# Patient Record
Sex: FEMALE | Race: WHITE | NOT HISPANIC OR LATINO | Employment: UNEMPLOYED | URBAN - METROPOLITAN AREA
[De-identification: names, ages, dates, MRNs, and addresses within clinical notes are randomized per-mention and may not be internally consistent; named-entity substitution may affect disease eponyms.]

---

## 2022-07-25 ENCOUNTER — IMMUNIZATION (OUTPATIENT)
Dept: INTERNAL MEDICINE | Facility: CLINIC | Age: 62
End: 2022-07-25

## 2022-07-25 DIAGNOSIS — Z23 NEED FOR VACCINATION: Primary | ICD-10-CM

## 2022-07-25 PROCEDURE — 0054A COVID-19, MRNA, LNP-S, PF, 30 MCG/0.3 ML DOSE VACCINE (PFIZER): CPT | Mod: CV19,PBBFAC | Performed by: INTERNAL MEDICINE

## 2023-01-30 ENCOUNTER — IMMUNIZATION (OUTPATIENT)
Dept: INTERNAL MEDICINE | Facility: CLINIC | Age: 63
End: 2023-01-30

## 2023-01-30 DIAGNOSIS — Z23 NEED FOR VACCINATION: Primary | ICD-10-CM

## 2023-01-30 PROCEDURE — 0124A COVID-19, MRNA, LNP-S, BIVALENT BOOSTER, PF, 30 MCG/0.3 ML DOSE: CPT | Mod: CV19,PBBFAC | Performed by: INTERNAL MEDICINE

## 2023-01-30 PROCEDURE — 91312 COVID-19, MRNA, LNP-S, BIVALENT BOOSTER, PF, 30 MCG/0.3 ML DOSE: CPT | Mod: PBBFAC

## 2023-07-11 ENCOUNTER — OFFICE VISIT (OUTPATIENT)
Dept: DERMATOLOGY | Facility: CLINIC | Age: 63
End: 2023-07-11
Payer: COMMERCIAL

## 2023-07-11 DIAGNOSIS — L28.0 LICHEN SIMPLEX CHRONICUS: ICD-10-CM

## 2023-07-11 DIAGNOSIS — Z79.899 ENCOUNTER FOR LONG-TERM CURRENT USE OF HIGH RISK MEDICATION: ICD-10-CM

## 2023-07-11 DIAGNOSIS — B35.4 TINEA CORPORIS: Primary | ICD-10-CM

## 2023-07-11 PROCEDURE — 99999 PR PBB SHADOW E&M-EST. PATIENT-LVL II: ICD-10-PCS | Mod: PBBFAC,,, | Performed by: DERMATOLOGY

## 2023-07-11 PROCEDURE — 1159F MED LIST DOCD IN RCRD: CPT | Mod: CPTII,S$GLB,, | Performed by: DERMATOLOGY

## 2023-07-11 PROCEDURE — 99204 OFFICE O/P NEW MOD 45 MIN: CPT | Mod: S$GLB,,, | Performed by: DERMATOLOGY

## 2023-07-11 PROCEDURE — 99204 PR OFFICE/OUTPT VISIT, NEW, LEVL IV, 45-59 MIN: ICD-10-PCS | Mod: S$GLB,,, | Performed by: DERMATOLOGY

## 2023-07-11 PROCEDURE — 1160F PR REVIEW ALL MEDS BY PRESCRIBER/CLIN PHARMACIST DOCUMENTED: ICD-10-PCS | Mod: CPTII,S$GLB,, | Performed by: DERMATOLOGY

## 2023-07-11 PROCEDURE — 1159F PR MEDICATION LIST DOCUMENTED IN MEDICAL RECORD: ICD-10-PCS | Mod: CPTII,S$GLB,, | Performed by: DERMATOLOGY

## 2023-07-11 PROCEDURE — 1160F RVW MEDS BY RX/DR IN RCRD: CPT | Mod: CPTII,S$GLB,, | Performed by: DERMATOLOGY

## 2023-07-11 PROCEDURE — 99999 PR PBB SHADOW E&M-EST. PATIENT-LVL II: CPT | Mod: PBBFAC,,, | Performed by: DERMATOLOGY

## 2023-07-11 RX ORDER — KETOCONAZOLE 20 MG/ML
SHAMPOO, SUSPENSION TOPICAL DAILY
Qty: 120 ML | Refills: 5 | Status: SHIPPED | OUTPATIENT
Start: 2023-07-11

## 2023-07-11 RX ORDER — KETOCONAZOLE 20 MG/G
CREAM TOPICAL 2 TIMES DAILY
Qty: 120 G | Refills: 5 | Status: SHIPPED | OUTPATIENT
Start: 2023-07-11

## 2023-07-12 ENCOUNTER — LAB VISIT (OUTPATIENT)
Dept: LAB | Facility: HOSPITAL | Age: 63
End: 2023-07-12
Attending: DERMATOLOGY
Payer: COMMERCIAL

## 2023-07-12 DIAGNOSIS — Z79.899 ENCOUNTER FOR LONG-TERM CURRENT USE OF HIGH RISK MEDICATION: ICD-10-CM

## 2023-07-12 DIAGNOSIS — B35.4 TINEA CORPORIS: ICD-10-CM

## 2023-07-12 LAB
ALBUMIN SERPL BCP-MCNC: 3.5 G/DL (ref 3.5–5.2)
ALP SERPL-CCNC: 135 U/L (ref 55–135)
ALT SERPL W/O P-5'-P-CCNC: 35 U/L (ref 10–44)
ANION GAP SERPL CALC-SCNC: 11 MMOL/L (ref 8–16)
AST SERPL-CCNC: 23 U/L (ref 10–40)
BASOPHILS # BLD AUTO: 0.04 K/UL (ref 0–0.2)
BASOPHILS NFR BLD: 0.4 % (ref 0–1.9)
BILIRUB SERPL-MCNC: 0.7 MG/DL (ref 0.1–1)
BUN SERPL-MCNC: 10 MG/DL (ref 6–20)
CALCIUM SERPL-MCNC: 9.1 MG/DL (ref 8.7–10.5)
CHLORIDE SERPL-SCNC: 97 MMOL/L (ref 95–110)
CHOLEST SERPL-MCNC: 191 MG/DL (ref 120–199)
CHOLEST/HDLC SERPL: 6 {RATIO} (ref 2–5)
CO2 SERPL-SCNC: 27 MMOL/L (ref 23–29)
CREAT SERPL-MCNC: 0.8 MG/DL (ref 0.5–1.4)
DIFFERENTIAL METHOD: ABNORMAL
EOSINOPHIL # BLD AUTO: 0.1 K/UL (ref 0–0.5)
EOSINOPHIL NFR BLD: 1 % (ref 0–8)
ERYTHROCYTE [DISTWIDTH] IN BLOOD BY AUTOMATED COUNT: 12.9 % (ref 11.5–14.5)
EST. GFR  (NO RACE VARIABLE): >60 ML/MIN/1.73 M^2
ESTIMATED AVG GLUCOSE: 312 MG/DL (ref 68–131)
GLUCOSE SERPL-MCNC: 362 MG/DL (ref 70–110)
HBA1C MFR BLD: 12.5 % (ref 4–5.6)
HCT VFR BLD AUTO: 43.5 % (ref 37–48.5)
HDLC SERPL-MCNC: 32 MG/DL (ref 40–75)
HDLC SERPL: 16.8 % (ref 20–50)
HGB BLD-MCNC: 14.1 G/DL (ref 12–16)
IMM GRANULOCYTES # BLD AUTO: 0.06 K/UL (ref 0–0.04)
IMM GRANULOCYTES NFR BLD AUTO: 0.6 % (ref 0–0.5)
LDLC SERPL CALC-MCNC: 114 MG/DL (ref 63–159)
LYMPHOCYTES # BLD AUTO: 1.6 K/UL (ref 1–4.8)
LYMPHOCYTES NFR BLD: 17.2 % (ref 18–48)
MCH RBC QN AUTO: 29.1 PG (ref 27–31)
MCHC RBC AUTO-ENTMCNC: 32.4 G/DL (ref 32–36)
MCV RBC AUTO: 90 FL (ref 82–98)
MONOCYTES # BLD AUTO: 0.5 K/UL (ref 0.3–1)
MONOCYTES NFR BLD: 5.7 % (ref 4–15)
NEUTROPHILS # BLD AUTO: 7.2 K/UL (ref 1.8–7.7)
NEUTROPHILS NFR BLD: 75.1 % (ref 38–73)
NONHDLC SERPL-MCNC: 159 MG/DL
NRBC BLD-RTO: 0 /100 WBC
PLATELET # BLD AUTO: 223 K/UL (ref 150–450)
PMV BLD AUTO: 9.3 FL (ref 9.2–12.9)
POTASSIUM SERPL-SCNC: 4.5 MMOL/L (ref 3.5–5.1)
PROT SERPL-MCNC: 7.5 G/DL (ref 6–8.4)
RBC # BLD AUTO: 4.84 M/UL (ref 4–5.4)
SODIUM SERPL-SCNC: 135 MMOL/L (ref 136–145)
TRIGL SERPL-MCNC: 225 MG/DL (ref 30–150)
WBC # BLD AUTO: 9.53 K/UL (ref 3.9–12.7)

## 2023-07-12 PROCEDURE — 83036 HEMOGLOBIN GLYCOSYLATED A1C: CPT | Performed by: DERMATOLOGY

## 2023-07-12 PROCEDURE — 85025 COMPLETE CBC W/AUTO DIFF WBC: CPT | Performed by: DERMATOLOGY

## 2023-07-12 PROCEDURE — 80053 COMPREHEN METABOLIC PANEL: CPT | Performed by: DERMATOLOGY

## 2023-07-12 PROCEDURE — 80061 LIPID PANEL: CPT | Performed by: DERMATOLOGY

## 2023-07-12 PROCEDURE — 36415 COLL VENOUS BLD VENIPUNCTURE: CPT | Mod: PO | Performed by: DERMATOLOGY

## 2023-07-13 RX ORDER — TERBINAFINE HYDROCHLORIDE 250 MG/1
250 TABLET ORAL DAILY
Qty: 30 TABLET | Refills: 0 | Status: SHIPPED | OUTPATIENT
Start: 2023-07-13 | End: 2023-08-01

## 2023-07-20 ENCOUNTER — PATIENT OUTREACH (OUTPATIENT)
Dept: ADMINISTRATIVE | Facility: HOSPITAL | Age: 63
End: 2023-07-20
Payer: COMMERCIAL

## 2023-07-20 NOTE — PROGRESS NOTES
Health Maintenance Due   Topic Date Due    Hepatitis C Screening  Never done    Cervical Cancer Screening  Never done    HIV Screening  Never done    TETANUS VACCINE  Never done    Mammogram  Never done    Colorectal Cancer Screening  Never done    Shingles Vaccine (1 of 2) Never done      Chart reviewed. Triggered LINKS. Updated Care Everywhere. Checked [facility], [facility] and [facility] for outside records No results.     Malyl Sierra CMA  Population Health Care Coordinator  Primary Care Team

## 2023-07-25 ENCOUNTER — LAB VISIT (OUTPATIENT)
Dept: LAB | Facility: HOSPITAL | Age: 63
End: 2023-07-25
Attending: INTERNAL MEDICINE
Payer: COMMERCIAL

## 2023-07-25 ENCOUNTER — OFFICE VISIT (OUTPATIENT)
Dept: INTERNAL MEDICINE | Facility: CLINIC | Age: 63
End: 2023-07-25
Payer: COMMERCIAL

## 2023-07-25 ENCOUNTER — PATIENT MESSAGE (OUTPATIENT)
Dept: DERMATOLOGY | Facility: CLINIC | Age: 63
End: 2023-07-25
Payer: COMMERCIAL

## 2023-07-25 VITALS
HEART RATE: 98 BPM | WEIGHT: 170.19 LBS | SYSTOLIC BLOOD PRESSURE: 138 MMHG | BODY MASS INDEX: 35.73 KG/M2 | HEIGHT: 58 IN | OXYGEN SATURATION: 96 % | DIASTOLIC BLOOD PRESSURE: 82 MMHG

## 2023-07-25 DIAGNOSIS — E66.01 CLASS 2 SEVERE OBESITY DUE TO EXCESS CALORIES WITH SERIOUS COMORBIDITY AND BODY MASS INDEX (BMI) OF 36.0 TO 36.9 IN ADULT: ICD-10-CM

## 2023-07-25 DIAGNOSIS — Z11.59 NEED FOR HEPATITIS C SCREENING TEST: ICD-10-CM

## 2023-07-25 DIAGNOSIS — Z23 NEED FOR VACCINATION FOR STREP PNEUMONIAE: ICD-10-CM

## 2023-07-25 DIAGNOSIS — E11.42 DIABETIC POLYNEUROPATHY ASSOCIATED WITH TYPE 2 DIABETES MELLITUS: ICD-10-CM

## 2023-07-25 DIAGNOSIS — E11.65 TYPE 2 DIABETES MELLITUS WITH HYPERGLYCEMIA, WITHOUT LONG-TERM CURRENT USE OF INSULIN: ICD-10-CM

## 2023-07-25 DIAGNOSIS — Z11.4 ENCOUNTER FOR SCREENING FOR HIV: ICD-10-CM

## 2023-07-25 DIAGNOSIS — Z76.89 ENCOUNTER TO ESTABLISH CARE: Primary | ICD-10-CM

## 2023-07-25 DIAGNOSIS — S91.209A AVULSION OF TOENAIL, INITIAL ENCOUNTER: ICD-10-CM

## 2023-07-25 DIAGNOSIS — Z12.31 BREAST CANCER SCREENING BY MAMMOGRAM: ICD-10-CM

## 2023-07-25 DIAGNOSIS — Z12.11 SCREENING FOR COLON CANCER: ICD-10-CM

## 2023-07-25 LAB
HCV AB SERPL QL IA: NORMAL
HIV 1+2 AB+HIV1 P24 AG SERPL QL IA: NORMAL

## 2023-07-25 PROCEDURE — 1159F PR MEDICATION LIST DOCUMENTED IN MEDICAL RECORD: ICD-10-PCS | Mod: CPTII,S$GLB,, | Performed by: INTERNAL MEDICINE

## 2023-07-25 PROCEDURE — 36415 COLL VENOUS BLD VENIPUNCTURE: CPT | Performed by: INTERNAL MEDICINE

## 2023-07-25 PROCEDURE — 3008F PR BODY MASS INDEX (BMI) DOCUMENTED: ICD-10-PCS | Mod: CPTII,S$GLB,, | Performed by: INTERNAL MEDICINE

## 2023-07-25 PROCEDURE — 90471 IMMUNIZATION ADMIN: CPT | Mod: S$GLB,,, | Performed by: INTERNAL MEDICINE

## 2023-07-25 PROCEDURE — 90677 PCV20 VACCINE IM: CPT | Mod: S$GLB,,, | Performed by: INTERNAL MEDICINE

## 2023-07-25 PROCEDURE — 3075F SYST BP GE 130 - 139MM HG: CPT | Mod: CPTII,S$GLB,, | Performed by: INTERNAL MEDICINE

## 2023-07-25 PROCEDURE — 99386 PREV VISIT NEW AGE 40-64: CPT | Mod: 25,S$GLB,, | Performed by: INTERNAL MEDICINE

## 2023-07-25 PROCEDURE — 3079F PR MOST RECENT DIASTOLIC BLOOD PRESSURE 80-89 MM HG: ICD-10-PCS | Mod: CPTII,S$GLB,, | Performed by: INTERNAL MEDICINE

## 2023-07-25 PROCEDURE — 99386 PR PREVENTIVE VISIT,NEW,40-64: ICD-10-PCS | Mod: 25,S$GLB,, | Performed by: INTERNAL MEDICINE

## 2023-07-25 PROCEDURE — 3079F DIAST BP 80-89 MM HG: CPT | Mod: CPTII,S$GLB,, | Performed by: INTERNAL MEDICINE

## 2023-07-25 PROCEDURE — 3008F BODY MASS INDEX DOCD: CPT | Mod: CPTII,S$GLB,, | Performed by: INTERNAL MEDICINE

## 2023-07-25 PROCEDURE — 1159F MED LIST DOCD IN RCRD: CPT | Mod: CPTII,S$GLB,, | Performed by: INTERNAL MEDICINE

## 2023-07-25 PROCEDURE — 3046F PR MOST RECENT HEMOGLOBIN A1C LEVEL > 9.0%: ICD-10-PCS | Mod: CPTII,S$GLB,, | Performed by: INTERNAL MEDICINE

## 2023-07-25 PROCEDURE — 3046F HEMOGLOBIN A1C LEVEL >9.0%: CPT | Mod: CPTII,S$GLB,, | Performed by: INTERNAL MEDICINE

## 2023-07-25 PROCEDURE — 99999 PR PBB SHADOW E&M-EST. PATIENT-LVL V: ICD-10-PCS | Mod: PBBFAC,,, | Performed by: INTERNAL MEDICINE

## 2023-07-25 PROCEDURE — 86803 HEPATITIS C AB TEST: CPT | Performed by: INTERNAL MEDICINE

## 2023-07-25 PROCEDURE — 87389 HIV-1 AG W/HIV-1&-2 AB AG IA: CPT | Performed by: INTERNAL MEDICINE

## 2023-07-25 PROCEDURE — 3075F PR MOST RECENT SYSTOLIC BLOOD PRESS GE 130-139MM HG: ICD-10-PCS | Mod: CPTII,S$GLB,, | Performed by: INTERNAL MEDICINE

## 2023-07-25 PROCEDURE — 90471 PNEUMOCOCCAL CONJUGATE VACCINE 20-VALENT: ICD-10-PCS | Mod: S$GLB,,, | Performed by: INTERNAL MEDICINE

## 2023-07-25 PROCEDURE — 90677 PNEUMOCOCCAL CONJUGATE VACCINE 20-VALENT: ICD-10-PCS | Mod: S$GLB,,, | Performed by: INTERNAL MEDICINE

## 2023-07-25 PROCEDURE — 99999 PR PBB SHADOW E&M-EST. PATIENT-LVL V: CPT | Mod: PBBFAC,,, | Performed by: INTERNAL MEDICINE

## 2023-07-25 RX ORDER — CEPHALEXIN 500 MG/1
500 CAPSULE ORAL EVERY 6 HOURS
Qty: 20 CAPSULE | Refills: 0 | Status: SHIPPED | OUTPATIENT
Start: 2023-07-25 | End: 2023-07-30

## 2023-07-25 RX ORDER — INSULIN PUMP SYRINGE, 3 ML
EACH MISCELLANEOUS
Qty: 1 EACH | Refills: 0 | Status: SHIPPED | OUTPATIENT
Start: 2023-07-25 | End: 2024-07-24

## 2023-07-25 RX ORDER — GABAPENTIN 100 MG/1
100 CAPSULE ORAL 3 TIMES DAILY
Qty: 90 CAPSULE | Refills: 11 | Status: SHIPPED | OUTPATIENT
Start: 2023-07-25 | End: 2023-08-28

## 2023-07-25 RX ORDER — METFORMIN HYDROCHLORIDE 500 MG/1
1000 TABLET, EXTENDED RELEASE ORAL 2 TIMES DAILY WITH MEALS
Qty: 360 TABLET | Refills: 3 | Status: SHIPPED | OUTPATIENT
Start: 2023-07-25 | End: 2024-07-24

## 2023-07-25 RX ORDER — LANCETS
EACH MISCELLANEOUS
Qty: 200 EACH | Refills: 11 | Status: SHIPPED | OUTPATIENT
Start: 2023-07-25 | End: 2023-09-24 | Stop reason: SDUPTHER

## 2023-07-25 RX ORDER — PEN NEEDLE, DIABETIC 30 GX3/16"
1 NEEDLE, DISPOSABLE MISCELLANEOUS DAILY
Qty: 100 EACH | Refills: 5 | Status: SHIPPED | OUTPATIENT
Start: 2023-07-25

## 2023-07-25 RX ORDER — INSULIN DETEMIR 100 [IU]/ML
10 INJECTION, SOLUTION SUBCUTANEOUS NIGHTLY
Qty: 15 ML | Refills: 3 | Status: SHIPPED | OUTPATIENT
Start: 2023-07-25 | End: 2025-03-16

## 2023-07-25 NOTE — PROGRESS NOTES
Subjective:       Patient ID: Shelley Levy is a 62 y.o. female.    Chief Complaint: Diabetes      HPI  Shelley Levy is a 62 y.o. year old female presents for establishment of care. Wife of Rodrigue Levy, his primary caretaker. I have known this new patient for many years. She has not seen a PCP in decades. Saw dermatology, found to have extensive tinea corporis. Work up included serology revealing uncontrolled diabetes. Started on antifungals by dermatology. Here today to establish care and to catch up on health maintenance.     Review of Systems   Constitutional:  Negative for activity change, appetite change, fatigue, fever and unexpected weight change.   HENT:  Negative for congestion, hearing loss, postnasal drip, sneezing, sore throat, trouble swallowing and voice change.    Eyes:  Negative for pain and discharge.   Respiratory:  Negative for cough, choking, chest tightness, shortness of breath and wheezing.    Cardiovascular:  Negative for chest pain, palpitations and leg swelling.   Gastrointestinal:  Negative for abdominal distention, abdominal pain, blood in stool, constipation, diarrhea, nausea and vomiting.   Endocrine: Negative for polydipsia and polyuria.   Genitourinary:  Negative for difficulty urinating and flank pain.   Musculoskeletal:  Negative for arthralgias, back pain, joint swelling, myalgias and neck pain.   Skin:  Positive for rash.        Nail avulsion, was trying to trim her own toe nails yesterday and avulsed toe nail of left great toe   Neurological:  Negative for dizziness, tremors, seizures, weakness, numbness and headaches.   Psychiatric/Behavioral:  Negative for agitation. The patient is not nervous/anxious.        No past medical history on file.     Prior to Admission medications    Medication Sig Start Date End Date Taking? Authorizing Provider   ketoconazole (NIZORAL) 2 % cream Apply topically 2 (two) times daily. To rash under breasts and on back 7/11/23  Yes Ruth  "MD Heidy   ketoconazole (NIZORAL) 2 % shampoo Apply topically once daily. Use in shower on rash, then rinse. 7/11/23  Yes Ruth Moody MD   blood sugar diagnostic Strp To check BG 4 times daily, to use with insurance preferred meter 7/25/23   Corey Hernandez MD   blood-glucose meter kit To check BG 4 times daily, to use with insurance preferred meter 7/25/23 7/24/24  Corey Hernandez MD   cephALEXin (KEFLEX) 500 MG capsule Take 1 capsule (500 mg total) by mouth every 6 (six) hours. for 5 days 7/25/23 7/30/23  Corey Hernandez MD   gabapentin (NEURONTIN) 100 MG capsule Take 1 capsule (100 mg total) by mouth 3 (three) times daily. 7/25/23 7/24/24  Corey Hernandez MD   insulin detemir U-100, Levemir, (LEVEMIR FLEXPEN) 100 unit/mL (3 mL) InPn pen Inject 10 Units into the skin every evening. 7/25/23 3/16/25  Corey Hernandez MD   lancets Misc To check BG 4 times daily, to use with insurance preferred meter 7/25/23   Corey Hernandez MD   metFORMIN (GLUCOPHAGE-XR) 500 MG ER 24hr tablet Take 2 tablets (1,000 mg total) by mouth 2 (two) times daily with meals. 7/25/23 7/24/24  Corey Hernandez MD   pen needle, diabetic 32 gauge x 5/32" Ndle 1 Stick by Misc.(Non-Drug; Combo Route) route once daily. 7/25/23   Corey Hernandez MD   terbinafine HCL (LAMISIL) 250 mg tablet Take 1 tablet (250 mg total) by mouth once daily.  Patient not taking: Reported on 7/25/2023 7/13/23 8/12/23  Ruth Moody MD        Past medical history, surgical history, and family medical history reviewed and updated as appropriate.    Medications and allergies reviewed.     Objective:          Vitals:    07/25/23 0820   BP: 138/82   BP Location: Left arm   Patient Position: Sitting   BP Method: Medium (Manual)   Pulse: 98   SpO2: 96%   Weight: 77.2 kg (170 lb 3.1 oz)   Height: 4' 9.5" (1.461 m)     Body mass index is 36.19 kg/m².  Physical Exam  Constitutional:       Appearance: She is well-developed.   HENT:      Head: Normocephalic and atraumatic.   Eyes:      Extraocular Movements: " Extraocular movements intact.   Cardiovascular:      Rate and Rhythm: Normal rate and regular rhythm.      Heart sounds: Normal heart sounds.   Pulmonary:      Effort: Pulmonary effort is normal. No respiratory distress.      Breath sounds: Normal breath sounds. No wheezing.   Abdominal:      General: Bowel sounds are normal. There is no distension.      Palpations: Abdomen is soft.      Tenderness: There is no abdominal tenderness.   Musculoskeletal:         General: No tenderness. Normal range of motion.      Cervical back: Normal range of motion.   Skin:     General: Skin is warm and dry.   Neurological:      Mental Status: She is alert and oriented to person, place, and time.      Cranial Nerves: No cranial nerve deficit.      Deep Tendon Reflexes: Reflexes are normal and symmetric.       Lab Results   Component Value Date    WBC 9.53 07/12/2023    HGB 14.1 07/12/2023    HCT 43.5 07/12/2023     07/12/2023    CHOL 191 07/12/2023    TRIG 225 (H) 07/12/2023    HDL 32 (L) 07/12/2023    ALT 35 07/12/2023    AST 23 07/12/2023     (L) 07/12/2023    K 4.5 07/12/2023    CL 97 07/12/2023    CREATININE 0.8 07/12/2023    BUN 10 07/12/2023    CO2 27 07/12/2023    HGBA1C 12.5 (H) 07/12/2023       Assessment:       1. Encounter to establish care    2. Type 2 diabetes mellitus with hyperglycemia, without long-term current use of insulin    3. Diabetic polyneuropathy associated with type 2 diabetes mellitus    4. Encounter for screening for HIV    5. Need for hepatitis C screening test    6. Breast cancer screening by mammogram    7. Screening for colon cancer    8. Need for vaccination for Strep pneumoniae    9. Avulsion of toenail, initial encounter    10. Class 2 severe obesity due to excess calories with serious comorbidity and body mass index (BMI) of 36.0 to 36.9 in adult          Plan:     Shelley was seen today for diabetes.    Diagnoses and all orders for this visit:    Encounter to establish care    Type 2  "diabetes mellitus with hyperglycemia, without long-term current use of insulin  Comments:  a1c 12.5, start metformin 1000 BID, start levemir 10 units daily. will likely start ozempic in 3 months. starting insulin today.  Orders:  -     metFORMIN (GLUCOPHAGE-XR) 500 MG ER 24hr tablet; Take 2 tablets (1,000 mg total) by mouth 2 (two) times daily with meals.  -     Microalbumin/Creatinine Ratio, Urine; Future  -     insulin detemir U-100, Levemir, (LEVEMIR FLEXPEN) 100 unit/mL (3 mL) InPn pen; Inject 10 Units into the skin every evening.  -     pen needle, diabetic 32 gauge x 5/32" Ndle; 1 Stick by Misc.(Non-Drug; Combo Route) route once daily.  -     blood-glucose meter kit; To check BG 4 times daily, to use with insurance preferred meter  -     lancets Misc; To check BG 4 times daily, to use with insurance preferred meter  -     blood sugar diagnostic Strp; To check BG 4 times daily, to use with insurance preferred meter  -     Ambulatory referral/consult to Podiatry; Future  -     cephALEXin (KEFLEX) 500 MG capsule; Take 1 capsule (500 mg total) by mouth every 6 (six) hours. for 5 days  -     Ambulatory referral/consult to Diabetes Education; Future    Diabetic polyneuropathy associated with type 2 diabetes mellitus  Comments:  gabapentin, needs foot exam with podiatry  Orders:  -     gabapentin (NEURONTIN) 100 MG capsule; Take 1 capsule (100 mg total) by mouth 3 (three) times daily.  -     cephALEXin (KEFLEX) 500 MG capsule; Take 1 capsule (500 mg total) by mouth every 6 (six) hours. for 5 days  -     Ambulatory referral/consult to Diabetes Education; Future    Encounter for screening for HIV  -     HIV 1/2 Ag/Ab (4th Gen); Future    Need for hepatitis C screening test  -     HEPATITIS C ANTIBODY; Future    Breast cancer screening by mammogram  -     Mammo Digital Screening Bilat; Future    Screening for colon cancer  -     Fecal Immunochemical Test (iFOBT); Future    Need for vaccination for Strep pneumoniae  -    "  (In Office Administered) Pneumococcal Conjugate Vaccine (20 Valent) (IM)    Avulsion of toenail, initial encounter  -     Ambulatory referral/consult to Podiatry; Future  -     cephALEXin (KEFLEX) 500 MG capsule; Take 1 capsule (500 mg total) by mouth every 6 (six) hours. for 5 days    Class 2 severe obesity due to excess calories with serious comorbidity and body mass index (BMI) of 36.0 to 36.9 in adult  Comments:  discussed dietary changes, refer to health  and diabetes education        Health maintenance reviewed with patient. Some HM ordered today, will follow up in 4 weeks to continue working on HM.     Follow up in about 1 month (around 8/25/2023).    Corey Hernandez MD  Internal Medicine / Primary Care  [facility]  7/25/2023

## 2023-07-25 NOTE — PATIENT INSTRUCTIONS
Fasting labwork today  Urine today  [facility] - stool test for colon cancer screening, return at earliest convenience.   Pneumonia shot today    Start metformin 500 mg 1 tablet twice a day for 2 weeks, then increase to 1000 mg (2 tablets) twice a day.     Start levemir 10 units daily, either morning or evening, whichever is more convenient for you.    Start gabapentin 100 mg nightly for 3 days, then increase to twice a day for 3 days, then up to three times a day for your numbness in hands / feet.     Start keflex 500 mg four times a day for 5 days to prevent / infection of toe nail / skin.     Schedule optometry appointment    Schedule podiatry appointment - urgent    Return to clinic in 1 month or sooner if needed.

## 2023-07-25 NOTE — PROGRESS NOTES
After obtaining consent, and per orders of Dr. Corey Hernandez, injection of Prevnar 20 given by Tati Escalona on the right deltoid. Patient instructed to remain in clinic for 15 minutes afterwards, and to report any adverse reaction to me immediately.

## 2023-07-26 ENCOUNTER — PATIENT MESSAGE (OUTPATIENT)
Dept: ADMINISTRATIVE | Facility: HOSPITAL | Age: 63
End: 2023-07-26
Payer: COMMERCIAL

## 2023-07-27 DIAGNOSIS — R80.9 MICROALBUMINURIA: ICD-10-CM

## 2023-07-27 DIAGNOSIS — E11.65 TYPE 2 DIABETES MELLITUS WITH HYPERGLYCEMIA, WITHOUT LONG-TERM CURRENT USE OF INSULIN: Primary | ICD-10-CM

## 2023-07-27 RX ORDER — LISINOPRIL 5 MG/1
5 TABLET ORAL DAILY
Qty: 90 TABLET | Refills: 3 | Status: SHIPPED | OUTPATIENT
Start: 2023-07-27 | End: 2023-10-23 | Stop reason: SINTOL

## 2023-07-28 ENCOUNTER — TELEPHONE (OUTPATIENT)
Dept: INTERNAL MEDICINE | Facility: CLINIC | Age: 63
End: 2023-07-28
Payer: COMMERCIAL

## 2023-07-28 NOTE — TELEPHONE ENCOUNTER
Called and discussed test results and recommendations with the pt. The pt expressed understanding.

## 2023-07-28 NOTE — TELEPHONE ENCOUNTER
----- Message from Tati Escalona MA sent at 7/27/2023  4:48 PM CDT -----    ----- Message -----  From: Corey Hernandez MD  Sent: 7/27/2023   3:26 PM CDT  To: [facility] Staff    Loss of protein seen on urine sample due to diabetes. Will start low dose lisinopril 5 mg daily to protect kidneys from further damage from diabetes.

## 2023-08-01 ENCOUNTER — OFFICE VISIT (OUTPATIENT)
Dept: PODIATRY | Facility: CLINIC | Age: 63
End: 2023-08-01
Payer: COMMERCIAL

## 2023-08-01 VITALS
DIASTOLIC BLOOD PRESSURE: 82 MMHG | HEIGHT: 57 IN | BODY MASS INDEX: 36.72 KG/M2 | WEIGHT: 170.19 LBS | SYSTOLIC BLOOD PRESSURE: 138 MMHG | HEART RATE: 98 BPM

## 2023-08-01 DIAGNOSIS — L60.0 INGROWN NAIL: Primary | ICD-10-CM

## 2023-08-01 DIAGNOSIS — S91.209A AVULSION OF TOENAIL, INITIAL ENCOUNTER: ICD-10-CM

## 2023-08-01 DIAGNOSIS — E11.65 TYPE 2 DIABETES MELLITUS WITH HYPERGLYCEMIA, WITHOUT LONG-TERM CURRENT USE OF INSULIN: ICD-10-CM

## 2023-08-01 PROCEDURE — 3075F SYST BP GE 130 - 139MM HG: CPT | Mod: CPTII,S$GLB,, | Performed by: PODIATRIST

## 2023-08-01 PROCEDURE — 1159F PR MEDICATION LIST DOCUMENTED IN MEDICAL RECORD: ICD-10-PCS | Mod: CPTII,S$GLB,, | Performed by: PODIATRIST

## 2023-08-01 PROCEDURE — 3060F PR POS MICROALBUMINURIA RESULT DOCUMENTED/REVIEW: ICD-10-PCS | Mod: CPTII,S$GLB,, | Performed by: PODIATRIST

## 2023-08-01 PROCEDURE — 4010F ACE/ARB THERAPY RXD/TAKEN: CPT | Mod: CPTII,S$GLB,, | Performed by: PODIATRIST

## 2023-08-01 PROCEDURE — 3008F BODY MASS INDEX DOCD: CPT | Mod: CPTII,S$GLB,, | Performed by: PODIATRIST

## 2023-08-01 PROCEDURE — 3046F HEMOGLOBIN A1C LEVEL >9.0%: CPT | Mod: CPTII,S$GLB,, | Performed by: PODIATRIST

## 2023-08-01 PROCEDURE — 99999 PR PBB SHADOW E&M-EST. PATIENT-LVL IV: CPT | Mod: PBBFAC,,, | Performed by: PODIATRIST

## 2023-08-01 PROCEDURE — 99203 OFFICE O/P NEW LOW 30 MIN: CPT | Mod: 25,S$GLB,, | Performed by: PODIATRIST

## 2023-08-01 PROCEDURE — 99203 PR OFFICE/OUTPT VISIT, NEW, LEVL III, 30-44 MIN: ICD-10-PCS | Mod: 25,S$GLB,, | Performed by: PODIATRIST

## 2023-08-01 PROCEDURE — 11730 AVULSION NAIL PLATE SIMPLE 1: CPT | Mod: TA,S$GLB,, | Performed by: PODIATRIST

## 2023-08-01 PROCEDURE — 3008F PR BODY MASS INDEX (BMI) DOCUMENTED: ICD-10-PCS | Mod: CPTII,S$GLB,, | Performed by: PODIATRIST

## 2023-08-01 PROCEDURE — 3066F NEPHROPATHY DOC TX: CPT | Mod: CPTII,S$GLB,, | Performed by: PODIATRIST

## 2023-08-01 PROCEDURE — 3046F PR MOST RECENT HEMOGLOBIN A1C LEVEL > 9.0%: ICD-10-PCS | Mod: CPTII,S$GLB,, | Performed by: PODIATRIST

## 2023-08-01 PROCEDURE — 3075F PR MOST RECENT SYSTOLIC BLOOD PRESS GE 130-139MM HG: ICD-10-PCS | Mod: CPTII,S$GLB,, | Performed by: PODIATRIST

## 2023-08-01 PROCEDURE — 3079F PR MOST RECENT DIASTOLIC BLOOD PRESSURE 80-89 MM HG: ICD-10-PCS | Mod: CPTII,S$GLB,, | Performed by: PODIATRIST

## 2023-08-01 PROCEDURE — 3060F POS MICROALBUMINURIA REV: CPT | Mod: CPTII,S$GLB,, | Performed by: PODIATRIST

## 2023-08-01 PROCEDURE — 1159F MED LIST DOCD IN RCRD: CPT | Mod: CPTII,S$GLB,, | Performed by: PODIATRIST

## 2023-08-01 PROCEDURE — 4010F PR ACE/ARB THEARPY RXD/TAKEN: ICD-10-PCS | Mod: CPTII,S$GLB,, | Performed by: PODIATRIST

## 2023-08-01 PROCEDURE — 11730 PR REMOVAL OF NAIL PLATE: ICD-10-PCS | Mod: TA,S$GLB,, | Performed by: PODIATRIST

## 2023-08-01 PROCEDURE — 99999 PR PBB SHADOW E&M-EST. PATIENT-LVL IV: ICD-10-PCS | Mod: PBBFAC,,, | Performed by: PODIATRIST

## 2023-08-01 PROCEDURE — 3079F DIAST BP 80-89 MM HG: CPT | Mod: CPTII,S$GLB,, | Performed by: PODIATRIST

## 2023-08-01 PROCEDURE — 3066F PR DOCUMENTATION OF TREATMENT FOR NEPHROPATHY: ICD-10-PCS | Mod: CPTII,S$GLB,, | Performed by: PODIATRIST

## 2023-08-01 RX ORDER — TERBINAFINE HYDROCHLORIDE 250 MG/1
250 TABLET ORAL DAILY
Qty: 90 TABLET | Refills: 0 | Status: SHIPPED | OUTPATIENT
Start: 2023-08-01 | End: 2023-11-06

## 2023-08-02 ENCOUNTER — CLINICAL SUPPORT (OUTPATIENT)
Dept: DIABETES | Facility: CLINIC | Age: 63
End: 2023-08-02
Payer: COMMERCIAL

## 2023-08-02 DIAGNOSIS — E11.42 DIABETIC POLYNEUROPATHY ASSOCIATED WITH TYPE 2 DIABETES MELLITUS: ICD-10-CM

## 2023-08-02 DIAGNOSIS — E11.65 TYPE 2 DIABETES MELLITUS WITH HYPERGLYCEMIA, WITHOUT LONG-TERM CURRENT USE OF INSULIN: ICD-10-CM

## 2023-08-02 PROCEDURE — 99999 PR PBB SHADOW E&M-EST. PATIENT-LVL I: CPT | Mod: PBBFAC,,, | Performed by: DIETITIAN, REGISTERED

## 2023-08-02 PROCEDURE — 99999 PR PBB SHADOW E&M-EST. PATIENT-LVL I: ICD-10-PCS | Mod: PBBFAC,,, | Performed by: DIETITIAN, REGISTERED

## 2023-08-03 NOTE — PROGRESS NOTES
Diabetes Care Specialist Progress Note  Author: Rosi Li RD  Date: 8/3/2023    Program Intake  Reason for Diabetes Program Visit:: Initial Diabetes Assessment  Current diabetes risk level:: high  In the last 12 months, have you:: none  Permission to speak with others about care:: yes ()    Lab Results   Component Value Date    HGBA1C 12.5 (H) 07/12/2023       Clinical    Problem Review  Reviewed Problem List with Patient: yes  Active comorbidities affecting diabetes self-care.: no  Reviewed health maintenance: yes    Clinical Assessment  Current Diabetes Treatment: Insulin, Oral Medication  Have you ever experienced hypoglycemia (low blood sugar)?: no  Have you ever experienced hyperglycemia (high blood sugar)?: yes  In the last month, how often have you experienced high blood sugar?: more than once a day  Are you able to tell when your blood sugar is high?: No (comment)  Have you ever been hospitalized because your blood sugar was high?: no    Medication Information  How do you obtain your medications?: Patient drives  How many days a week do you miss your medications?: Never  Do you sometimes have difficulty refilling your medications?: No  Medication adherence impacting ability to self-manage diabetes?: No    Labs  Do you have regular lab work to monitor your medications?: No  Lab Compliance Barriers: Yes (Pt has not sought health care for over 20 years)    Nutritional Status  Diet: Regular  Meal Plan 24 Hour Recall: Breakfast, Lunch, Dinner, Snack  Meal Plan 24 Hour Recall - Breakfast: eggs  Meal Plan 24 Hour Recall - Lunch: salads with iceberg lettuce and raw broccoli, cauliflower, cucumbers, tomatoes, olives and cheese  Meal Plan 24 Hour Recall - Dinner: meat and veggies  Meal Plan 24 Hour Recall - Snack: nuts  Change in appetite?: No  Dentation:: Intact  Recent Changes in Weight: No Recent Weight Change  Current nutritional status an area of need that is impacting patient's ability to self-manage  diabetes?: No    Additional Social History    Support  Does anyone support you with your diabetes care?: yes  Who supports you?: spouse, self  Who takes you to your medical appointments?: self  Does the current support meet the patient's needs?: Yes  Is Support an area impacting ability to self-manage diabetes?: No    Access to Mass Media & Technology  Does the patient have access to any of the following devices or technologies?: Smart phone  Media or technology needs impacting ability to self-manage diabetes?: No    Cognitive/Behavioral Health  Alert and Oriented: Yes  Difficulty Thinking: No  Requires Prompting: No  Requires assistance for routine expression?: No  Cognitive or behavioral barriers impacting ability to self-manage diabetes?: No    Culture/[place]  Culture or Congregational beliefs that may impact ability to access healthcare: No    Communication  Language preference: English  Hearing Problems: No  Vision Problems: No  Communication needs impacting ability to self-manage diabetes?: No    Health Literacy  Preferred Learning Method: Face to Face, Reading Materials  How often do you need to have someone help you read instructions, pamphlets, or written material from your doctor or pharmacy?: Rarely  Health literacy needs impacting ability to self-manage diabetes?: No      Diabetes Self-Management Skills Assessment    Diabetes Disease Process/Treatment Options  Patient/caregiver able to state what happens when someone has diabetes.: no  Patient/caregiver knows what type of diabetes they have.: yes  Diabetes Type : Type II  Patient/caregiver able to identify at least three signs and symptoms of diabetes.: yes  Identified signs and symptoms:: increased thirst, frequent urination  Patient able to identify at least three risk factors for diabetes.: no  Diabetes Disease Process/Treatment Options: Skills Assessment Completed: Yes  Assessment indicates:: Instruction Needed  Area of need?: Yes    Nutrition/Healthy  Eating  Challenges to healthy eating:: portion control  Method of carbohydrate measurement:: no method  Patient can identify foods that impact blood sugar.: yes  Patient-identified foods:: soda, sweets, starches (bread, pasta, rice, cereal), fruit/fruit juice  Nutrition/Healthy Eating Skills Assessment Completed:: Yes  Assessment indicates:: Instruction Needed  Area of need?: Yes    Physical Activity/Exercise  Patient's daily activity level:: lightly active  Patient formally exercises outside of work.: no  Reasons for not exercising:: other (see comments) (no reason)  Patient can identify forms of physical activity.: yes  Stated forms of physical activity:: any movement performed by muscles that uses energy  Patient can identify reasons why exercise/physical activity is important in diabetes management.: no  Physical Activity/Exercise Skills Assessment Completed: : Yes  Assessment indicates:: Instruction Needed  Area of need?: Yes    Medications  Patient is able to describe current diabetes management routine.: yes  Diabetes management routine:: insulin, oral medications, diet  Patient is able to identify current diabetes medications, dosages, and appropriate timing of medications.: yes  Patient understands the purpose of the medications taken for diabetes.: no  Patient reports problems or concerns with current medication regimen.: no  Medication Skills Assessment Completed:: Yes  Assessment indicates:: Instruction Needed  Area of need?: Yes    Home Blood Glucose Monitoring  Patient states that blood sugar is checked at home daily.: yes  Monitoring Method:: home glucometer  How often do you check your blood sugar?: 4 times a day  When do you check your blood sugar?: Before breakfast, 2 hours after meal  When you check what is your typical blood sugar range? : Fasting B-285; Postprandials: 167-280  Blood glucose logs:: yes, encouraged to bring logs to provider visits  Blood glucose logs reviewed today?:  yes  Home Blood Glucose Monitoring Skills Assessment Completed: : Yes  Assessment indicates:: Instruction Needed  Area of need?: Yes    Acute Complications  Acute Complications Skills Assessment Completed: : No  Deffered due to:: Time    Chronic Complications  Chronic Complications Skills Assessment Completed: : No  Deferred due to:: Time    Psychosocial/Coping  Psychosocial/Coping Skills Assessment Completed: : No  Deffered due to:: Time         Assessment Summary and Plan    Based on today's diabetes care assessment, the following areas of need were identified:      Social 8/2/2023   Support No   Access to Mass Media/Tech No   Cognitive/Behavioral Health No   Culture/Jewish No   Communication No   Health Literacy No        Clinical 8/2/2023   Medication Adherence No   Lab Compliance Yes, pt has not sought health care in decades and has had no prior labs until recently.   Nutritional Status Yes, see health care        Diabetes Self-Management Skills 8/2/2023   Diabetes Disease Process/Treatment Options Yes, reviewed pathos and phys of T2DM   Nutrition/Healthy Eating Yes, see care plan   Physical Activity/Exercise Yes, taught the effects of exercise on BG, minimum recommendations on daily physical activity   Medication Yes, taught MOA of Metformin   Home Blood Glucose Monitoring Yes, taught BG target ranges          Today's interventions were provided through individual discussion, instruction, and written materials were provided.      Patient verbalized understanding of instruction and written materials.  Pt was able to return back demonstration of instructions today. Patient understood key points, needs reinforcement and further instruction.     Diabetes Self-Management Care Plan:    Today's Diabetes Self-Management Care Plan was developed with Shelley's input. Shelley has agreed to work toward the following goal(s) to improve his/her overall diabetes control.      Care Plan: Diabetes Management   Updates made since  7/4/2023 12:00 AM        Problem: Healthy Eating         Goal: Eat 3 meals daily with 30-45g/2-3 servings of Carbohydrate per meal.    Start Date: 8/2/2023   Expected End Date: 8/31/2024   Priority: High   Barriers: No Barriers Identified   Note:    8/2/23: Pt was told to give up all carbs and she has tried to do that since diagnosis a few weeks ago. She was drinking large icees often and sometimes twice a day. She no longer drinks any sugary beverages. She was glad to know she can eat some carbs. Pt is very petite. Recommend that she eat two carbs at each meal, but if she has a very physically active day, she can add a third carb to her meals if needed. Pt c/o hunger and neuropathy pain.       Task: Reviewed the sources and role of Carbohydrate, Protein, and Fat and how each nutrient impacts blood sugar. Completed 8/3/2023     Task: Explained how to count carbohydrates using the food label and the use of dry measuring cups for accurate carb counting. Completed 8/3/2023        Task: Discussed strategies for choosing healthier menu options when dining out. Completed 8/3/2023     Task: Review the importance of balancing carbohydrates with each meal using portion control techniques to count servings of carbohydrate and label reading to identify serving size and amount of total carbs per serving. Completed 8/3/2023        Task: Provided Sample plate method and reviewed the use of the plate to estimate amounts of carbohydrate per meal. Completed 8/3/2023          Follow Up Plan     Follow up in about 2 months (around 10/2/2023). Review A1c, BG logs, assess carb counting skills and hunger, hydration, physical activity.    Today's care plan and follow up schedule was discussed with patient.  Shelley verbalized understanding of the care plan, goals, and agrees to follow up plan.        The patient was encouraged to communicate with his/her health care provider/physician and care team regarding his/her condition(s) and  treatment.  I provided the patient with my contact information today and encouraged to contact me via phone or [facility] Patient Portal as needed.     Length of Visit   Total Time: 90 Minutes

## 2023-08-04 NOTE — PROGRESS NOTES
Subjective:      Patient ID: Shelley Levy is a 62 y.o. female.    Chief Complaint:   Diabetes Mellitus (PCP-Corey Hernandez MD-7/25/2023) and Nail Problem (Left great toenail falling off )    Shelley is a 62 y.o. female who presents to the clinic complaining of thick and discolored toenails on both feet. Shelley is inquiring about treatment options.    Review of Systems   Constitutional: Negative for chills, decreased appetite, fever and malaise/fatigue.   HENT:  Negative for congestion, hearing loss, nosebleeds and tinnitus.    Eyes:  Negative for double vision, pain, photophobia and visual disturbance.   Cardiovascular:  Negative for chest pain, claudication, cyanosis and leg swelling.   Respiratory:  Negative for cough, hemoptysis, shortness of breath and wheezing.    Endocrine: Negative for cold intolerance and heat intolerance.   Hematologic/Lymphatic: Negative for adenopathy and bleeding problem.   Skin:  Positive for color change and nail changes. Negative for dry skin, itching and suspicious lesions.   Musculoskeletal:  Positive for arthritis. Negative for joint pain, myalgias and stiffness.   Gastrointestinal:  Negative for abdominal pain, jaundice, nausea and vomiting.   Genitourinary:  Negative for dysuria, frequency and hematuria.   Neurological:  Negative for difficulty with concentration, loss of balance, numbness, paresthesias and sensory change.   Psychiatric/Behavioral:  Negative for altered mental status, hallucinations and suicidal ideas. The patient is not nervous/anxious.    Allergic/Immunologic: Negative for environmental allergies and persistent infections.         Objective:      Physical Exam  Vitals reviewed.   Constitutional:       Appearance: She is well-developed.   HENT:      Head: Normocephalic and atraumatic.   Cardiovascular:      Pulses:           Dorsalis pedis pulses are 2+ on the right side and 2+ on the left side.        Posterior tibial pulses are 2+ on the right side and 2+ on  the left side.   Pulmonary:      Effort: Pulmonary effort is normal.   Musculoskeletal:         General: Normal range of motion.      Comments: Inspection and palpation of the muscles joints and bones of both lower extremities reveal that muscle strength for the anterior lateral and posterior muscle groups and intrinsic muscle groups of the foot are all 5 over 5 symmetrical.  Ankle subtalar midtarsal and digital joint range of motion are within normal limits, nonpainful, without crepitus or effusion.  Patient exhibits a normal angle and base of gait.  Palpation of the tendons reveal no defects.   Skin:     General: Skin is warm and dry.      Capillary Refill: Capillary refill takes 2 to 3 seconds.      Comments: Skin turgor is normal bilaterally.  Skin texture is well hydrated to both lower extremities.  No lesions or rashes or wounds appreciated bilaterally.   Thickened, discolored  toenails 1-5 with subungual debris     Neurological:      Mental Status: She is alert and oriented to person, place, and time.      Comments: Sharp dull light touch vibratory proprioceptive sensation are intact bilaterally.  Deep tendon reflexes to patellar and Achilles tendon are symmetrical 2 over 4 bilaterally.  No ankle clonus or Babinski reflexes noted bilaterally.  Coordination is normal to both feet and lower extremities.   Psychiatric:         Behavior: Behavior normal.           Assessment:       Encounter Diagnoses   Name Primary?    Type 2 diabetes mellitus with hyperglycemia, without long-term current use of insulin     Avulsion of toenail, initial encounter     Ingrown nail Yes     Independent visualization of imaging was performed.  Results were reviewed in detail with patient.       Plan:       Shelley was seen today for diabetes mellitus and nail problem.    Diagnoses and all orders for this visit:    Ingrown nail    Type 2 diabetes mellitus with hyperglycemia, without long-term current use of insulin  Comments:  a1c 12.5,  start metformin 1000 BID, start levemir 10 units daily. will likely start ozempic in 3 months. starting insulin today.  Orders:  -     Ambulatory referral/consult to Podiatry    Avulsion of toenail, initial encounter  -     Ambulatory referral/consult to Podiatry    Other orders  -     terbinafine HCL (LAMISIL) 250 mg tablet; Take 1 tablet (250 mg total) by mouth once daily.      I counseled the patient on her conditions, their implications and medical management.    The nature of the condition, options for management, as well as potential risks and complications were discussed in detail with patient. Patient was amenable to my recommendations and left my office fully informed and will follow up as instructed or sooner if necessary.      Temporary Nail Removal     Performed by:  Yossi Prado DPM     Consent Done?:  Yes (Written)     Location:  Left hallux nail    Anesthesia:  Digital block  Local anesthetic: 0.5% Marcaine without epinephrine  Anesthetic total (ml):  4  Preparation:  Skin prepped with alcohol and skin prepped with Betadine     Amount removed:  Total nail avulsion  Wedge excision of skin of nail fold: No    Nail bed sutured?: No    Nail matrix removed:  No  Removed nail replaced and anchored: No    Dressing applied:  4x4, antibiotic ointment and dressing applied  Patient tolerance:  Patient tolerated the procedure well with no immediate complications    Digital nerve block applied to the above-mentioned toe. Toe was prepped and draped in a sterile fashion and penrose drain applied. Anesthesia was confirmed and the offending portion of nail plate was freed from the nail bed and eponychium, and was then excised. The surgical site was then lavaged with copious amounts of 70% isopropyl alcohol. Penrose drain was removed and betadine ointment and dry sterile dressing applied. Post-op care instructions were discussed and dispensed to patient.    We discussed using Lamisil for onychomycosis. This drug  offers a fairly high but not universal cure rate. A 12 week treatment course is recommended. The patient is aware that rare cases of liver injury have been reported; and agrees to come in for liver function tests at 6 weeks of treatment. The symptoms of liver disease have been discussed; call if such occurs. In addition, some insurance plans do not cover the expense of this drug for treating a cosmetic condition, and the patient understands they may have to pay for the medication. Other side effects, such as headaches and rashes, have also been discussed.

## 2023-08-09 DIAGNOSIS — E11.9 TYPE 2 DIABETES MELLITUS WITHOUT COMPLICATION, UNSPECIFIED WHETHER LONG TERM INSULIN USE: ICD-10-CM

## 2023-08-14 ENCOUNTER — PATIENT MESSAGE (OUTPATIENT)
Dept: ADMINISTRATIVE | Facility: HOSPITAL | Age: 63
End: 2023-08-14
Payer: COMMERCIAL

## 2023-08-15 ENCOUNTER — PATIENT MESSAGE (OUTPATIENT)
Dept: DERMATOLOGY | Facility: CLINIC | Age: 63
End: 2023-08-15
Payer: COMMERCIAL

## 2023-08-23 ENCOUNTER — PATIENT OUTREACH (OUTPATIENT)
Dept: ADMINISTRATIVE | Facility: HOSPITAL | Age: 63
End: 2023-08-23
Payer: COMMERCIAL

## 2023-08-23 NOTE — PROGRESS NOTES
Health Maintenance Due   Topic Date Due    Foot Exam  Never done    Eye Exam  Never done    TETANUS VACCINE  Never done    Mammogram  Never done    Low Dose Statin  Never done    Colorectal Cancer Screening  Never done    Shingles Vaccine (1 of 2) Never done      Chart reviewed. Triggered LINKS. Updated Care Everywhere. Checked [facility], [facility] and [facility] for outside records No results. pt has upcoming pcp appointment.    Mally Sierra CMA  Population Health Care Coordinator  Primary Care Team

## 2023-08-28 ENCOUNTER — OFFICE VISIT (OUTPATIENT)
Dept: INTERNAL MEDICINE | Facility: CLINIC | Age: 63
End: 2023-08-28
Payer: COMMERCIAL

## 2023-08-28 ENCOUNTER — LAB VISIT (OUTPATIENT)
Dept: LAB | Facility: HOSPITAL | Age: 63
End: 2023-08-28
Attending: INTERNAL MEDICINE
Payer: COMMERCIAL

## 2023-08-28 VITALS
OXYGEN SATURATION: 96 % | BODY MASS INDEX: 36.06 KG/M2 | HEART RATE: 86 BPM | DIASTOLIC BLOOD PRESSURE: 82 MMHG | HEIGHT: 57 IN | SYSTOLIC BLOOD PRESSURE: 132 MMHG | WEIGHT: 167.13 LBS

## 2023-08-28 DIAGNOSIS — E78.5 DYSLIPIDEMIA: ICD-10-CM

## 2023-08-28 DIAGNOSIS — G62.9 POLYNEUROPATHY: ICD-10-CM

## 2023-08-28 DIAGNOSIS — E11.65 TYPE 2 DIABETES MELLITUS WITH HYPERGLYCEMIA, WITHOUT LONG-TERM CURRENT USE OF INSULIN: ICD-10-CM

## 2023-08-28 DIAGNOSIS — F32.A ANXIETY AND DEPRESSION: ICD-10-CM

## 2023-08-28 DIAGNOSIS — Z09 FOLLOW-UP EXAM: Primary | ICD-10-CM

## 2023-08-28 DIAGNOSIS — E11.42 DIABETIC POLYNEUROPATHY ASSOCIATED WITH TYPE 2 DIABETES MELLITUS: ICD-10-CM

## 2023-08-28 DIAGNOSIS — E66.01 CLASS 2 SEVERE OBESITY DUE TO EXCESS CALORIES WITH SERIOUS COMORBIDITY AND BODY MASS INDEX (BMI) OF 36.0 TO 36.9 IN ADULT: ICD-10-CM

## 2023-08-28 DIAGNOSIS — R80.9 MICROALBUMINURIA: ICD-10-CM

## 2023-08-28 DIAGNOSIS — F41.9 ANXIETY AND DEPRESSION: ICD-10-CM

## 2023-08-28 LAB
ESTIMATED AVG GLUCOSE: 203 MG/DL (ref 68–131)
FOLATE SERPL-MCNC: 8.9 NG/ML (ref 4–24)
HBA1C MFR BLD: 8.7 % (ref 4–5.6)
VIT B12 SERPL-MCNC: 202 PG/ML (ref 210–950)

## 2023-08-28 PROCEDURE — 82746 ASSAY OF FOLIC ACID SERUM: CPT | Performed by: INTERNAL MEDICINE

## 2023-08-28 PROCEDURE — 1159F MED LIST DOCD IN RCRD: CPT | Mod: CPTII,S$GLB,, | Performed by: INTERNAL MEDICINE

## 2023-08-28 PROCEDURE — 83036 HEMOGLOBIN GLYCOSYLATED A1C: CPT | Performed by: INTERNAL MEDICINE

## 2023-08-28 PROCEDURE — 3060F POS MICROALBUMINURIA REV: CPT | Mod: CPTII,S$GLB,, | Performed by: INTERNAL MEDICINE

## 2023-08-28 PROCEDURE — 3066F NEPHROPATHY DOC TX: CPT | Mod: CPTII,S$GLB,, | Performed by: INTERNAL MEDICINE

## 2023-08-28 PROCEDURE — 4010F ACE/ARB THERAPY RXD/TAKEN: CPT | Mod: CPTII,S$GLB,, | Performed by: INTERNAL MEDICINE

## 2023-08-28 PROCEDURE — 3008F BODY MASS INDEX DOCD: CPT | Mod: CPTII,S$GLB,, | Performed by: INTERNAL MEDICINE

## 2023-08-28 PROCEDURE — 82607 VITAMIN B-12: CPT | Performed by: INTERNAL MEDICINE

## 2023-08-28 PROCEDURE — 1159F PR MEDICATION LIST DOCUMENTED IN MEDICAL RECORD: ICD-10-PCS | Mod: CPTII,S$GLB,, | Performed by: INTERNAL MEDICINE

## 2023-08-28 PROCEDURE — 3075F PR MOST RECENT SYSTOLIC BLOOD PRESS GE 130-139MM HG: ICD-10-PCS | Mod: CPTII,S$GLB,, | Performed by: INTERNAL MEDICINE

## 2023-08-28 PROCEDURE — 3066F PR DOCUMENTATION OF TREATMENT FOR NEPHROPATHY: ICD-10-PCS | Mod: CPTII,S$GLB,, | Performed by: INTERNAL MEDICINE

## 2023-08-28 PROCEDURE — 3052F HG A1C>EQUAL 8.0%<EQUAL 9.0%: CPT | Mod: CPTII,S$GLB,, | Performed by: INTERNAL MEDICINE

## 2023-08-28 PROCEDURE — 84425 ASSAY OF VITAMIN B-1: CPT | Performed by: INTERNAL MEDICINE

## 2023-08-28 PROCEDURE — 3079F DIAST BP 80-89 MM HG: CPT | Mod: CPTII,S$GLB,, | Performed by: INTERNAL MEDICINE

## 2023-08-28 PROCEDURE — 3008F PR BODY MASS INDEX (BMI) DOCUMENTED: ICD-10-PCS | Mod: CPTII,S$GLB,, | Performed by: INTERNAL MEDICINE

## 2023-08-28 PROCEDURE — 3075F SYST BP GE 130 - 139MM HG: CPT | Mod: CPTII,S$GLB,, | Performed by: INTERNAL MEDICINE

## 2023-08-28 PROCEDURE — 36415 COLL VENOUS BLD VENIPUNCTURE: CPT | Performed by: INTERNAL MEDICINE

## 2023-08-28 PROCEDURE — 99999 PR PBB SHADOW E&M-EST. PATIENT-LVL IV: ICD-10-PCS | Mod: PBBFAC,,, | Performed by: INTERNAL MEDICINE

## 2023-08-28 PROCEDURE — 4010F PR ACE/ARB THEARPY RXD/TAKEN: ICD-10-PCS | Mod: CPTII,S$GLB,, | Performed by: INTERNAL MEDICINE

## 2023-08-28 PROCEDURE — 99214 OFFICE O/P EST MOD 30 MIN: CPT | Mod: S$GLB,,, | Performed by: INTERNAL MEDICINE

## 2023-08-28 PROCEDURE — 99214 PR OFFICE/OUTPT VISIT, EST, LEVL IV, 30-39 MIN: ICD-10-PCS | Mod: S$GLB,,, | Performed by: INTERNAL MEDICINE

## 2023-08-28 PROCEDURE — 99999 PR PBB SHADOW E&M-EST. PATIENT-LVL IV: CPT | Mod: PBBFAC,,, | Performed by: INTERNAL MEDICINE

## 2023-08-28 PROCEDURE — 3060F PR POS MICROALBUMINURIA RESULT DOCUMENTED/REVIEW: ICD-10-PCS | Mod: CPTII,S$GLB,, | Performed by: INTERNAL MEDICINE

## 2023-08-28 PROCEDURE — 3052F PR MOST RECENT HEMOGLOBIN A1C LEVEL 8.0 - < 9.0%: ICD-10-PCS | Mod: CPTII,S$GLB,, | Performed by: INTERNAL MEDICINE

## 2023-08-28 PROCEDURE — 3079F PR MOST RECENT DIASTOLIC BLOOD PRESSURE 80-89 MM HG: ICD-10-PCS | Mod: CPTII,S$GLB,, | Performed by: INTERNAL MEDICINE

## 2023-08-28 RX ORDER — GABAPENTIN 300 MG/1
300 CAPSULE ORAL 3 TIMES DAILY
Qty: 90 CAPSULE | Refills: 11 | Status: SHIPPED | OUTPATIENT
Start: 2023-08-28 | End: 2024-02-09 | Stop reason: SDUPTHER

## 2023-08-28 RX ORDER — LANCETS 33 GAUGE
EACH MISCELLANEOUS 4 TIMES DAILY
COMMUNITY
Start: 2023-07-25

## 2023-08-28 RX ORDER — PRAVASTATIN SODIUM 20 MG/1
20 TABLET ORAL DAILY
Qty: 90 TABLET | Refills: 3 | Status: SHIPPED | OUTPATIENT
Start: 2023-08-28 | End: 2023-11-22 | Stop reason: SDUPTHER

## 2023-08-28 NOTE — PATIENT INSTRUCTIONS
Labwork today  Start pravastatin 20 mg daily to reduce cardiovascular risk factors due to diabetes.   Increase gabapentin to 300 mg three times a day (ok to take twice a day until you get used to it.)     Psychiatry - a referral for [facility] has been placed for you. The psychiatry department wants you to call to schedule your own appointment, it cannot be scheduled for you through check-out. Please call [phone] to schedule this appointment.     Return to clinic as scheduled on 10/26/2023 or sooner if needed.

## 2023-08-28 NOTE — PROGRESS NOTES
Subjective:       Patient ID: Shelley Levy is a 62 y.o. female.    Chief Complaint: Follow-up      HPI  Shelley Levy is a 62 y.o. year old female established pt presents for follow up. Neuropathy pains still keeping her awake at night, most b/l lower extremities below level of knees. Does have neuropathic pain in b/l upper extremities as well. Taking gabapentin 100 mg in morning, 200 mg at night. 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     Review of Systems   Constitutional:  Negative for activity change, appetite change, fatigue, fever and unexpected weight change.   HENT:  Negative for congestion, hearing loss, postnasal drip, sneezing, sore throat, trouble swallowing and voice change.    Eyes:  Negative for pain and discharge.   Respiratory:  Negative for cough, choking, chest tightness, shortness of breath and wheezing.    Cardiovascular:  Negative for chest pain, palpitations and leg swelling.   Gastrointestinal:  Negative for abdominal distention, abdominal pain, blood in stool, constipation, diarrhea, nausea and vomiting.   Endocrine: Negative for polydipsia and polyuria.   Genitourinary:  Negative for difficulty urinating and flank pain.   Musculoskeletal:  Positive for arthralgias and myalgias. Negative for back pain, joint swelling and neck pain.   Skin:  Negative for rash.   Neurological:  Positive for numbness. Negative for dizziness, tremors, seizures, weakness and headaches.   Psychiatric/Behavioral:  Positive for dysphoric mood and sleep disturbance. Negative for agitation. The patient is nervous/anxious.          No past medical history on file.     Prior to Admission medications    Medication Sig Start Date End Date Taking? Authorizing Provider   blood sugar diagnostic Strp To check BG 4 times daily, to use with insurance preferred meter 7/25/23  Yes Corey Hernandez MD   blood-glucose meter kit To check BG 4 times daily, to use with insurance preferred  "meter 7/25/23 7/24/24 Yes Corey Hernandez MD   gabapentin (NEURONTIN) 100 MG capsule Take 1 capsule (100 mg total) by mouth 3 (three) times daily. 7/25/23 7/24/24 Yes Corey Hernandez MD   insulin detemir U-100, Levemir, (LEVEMIR FLEXPEN) 100 unit/mL (3 mL) InPn pen Inject 10 Units into the skin every evening. 7/25/23 3/16/25 Yes Corey Hernandez MD   ketoconazole (NIZORAL) 2 % cream Apply topically 2 (two) times daily. To rash under breasts and on back 7/11/23  Yes Ruth Moody MD   ketoconazole (NIZORAL) 2 % shampoo Apply topically once daily. Use in shower on rash, then rinse. 7/11/23  Yes Ruth Moody MD   lancets Misc To check BG 4 times daily, to use with insurance preferred meter 7/25/23  Yes Corey Hernandez MD   lisinopriL (PRINIVIL,ZESTRIL) 5 MG tablet Take 1 tablet (5 mg total) by mouth once daily. 7/27/23 7/26/24 Yes Corey Hernandez MD   metFORMIN (GLUCOPHAGE-XR) 500 MG ER 24hr tablet Take 2 tablets (1,000 mg total) by mouth 2 (two) times daily with meals. 7/25/23 7/24/24 Yes Corey Hernandez MD   pen needle, diabetic 32 gauge x 5/32" Ndle 1 Stick by Misc.(Non-Drug; Combo Route) route once daily. 7/25/23  Yes Corey Hernandez MD   terbinafine HCL (LAMISIL) 250 mg tablet Take 1 tablet (250 mg total) by mouth once daily.  Patient not taking: Reported on 8/28/2023 8/1/23 10/30/23  Yossi Prado DPM   TRUEPLUS LANCETS 33 gauge Misc Apply topically 4 (four) times daily. 7/25/23   Provider, Historical        Past medical history, surgical history, and family medical history reviewed and updated as appropriate.    Medications and allergies reviewed.     Objective:          Vitals:    08/28/23 1334   BP: 132/82   BP Location: Right arm   Patient Position: Sitting   Pulse: 86   SpO2: 96%   Weight: 75.8 kg (167 lb 1.7 oz)   Height: 4' 9" (1.448 m)     Body mass index is 36.16 kg/m².  Physical Exam  Constitutional:       Appearance: She is well-developed.   HENT:      Head: Normocephalic and atraumatic.   Eyes:      Extraocular " Movements: Extraocular movements intact.   Cardiovascular:      Rate and Rhythm: Normal rate and regular rhythm.      Heart sounds: Normal heart sounds.   Pulmonary:      Effort: Pulmonary effort is normal. No respiratory distress.      Breath sounds: Normal breath sounds. No wheezing.   Abdominal:      General: Bowel sounds are normal. There is no distension.      Palpations: Abdomen is soft.      Tenderness: There is no abdominal tenderness.   Musculoskeletal:         General: No tenderness. Normal range of motion.      Cervical back: Normal range of motion.   Skin:     General: Skin is warm and dry.   Neurological:      Mental Status: She is alert and oriented to person, place, and time.      Cranial Nerves: No cranial nerve deficit.      Deep Tendon Reflexes: Reflexes are normal and symmetric.         Lab Results   Component Value Date    WBC 9.53 07/12/2023    HGB 14.1 07/12/2023    HCT 43.5 07/12/2023     07/12/2023    CHOL 191 07/12/2023    TRIG 225 (H) 07/12/2023    HDL 32 (L) 07/12/2023    ALT 35 07/12/2023    AST 23 07/12/2023     (L) 07/12/2023    K 4.5 07/12/2023    CL 97 07/12/2023    CREATININE 0.8 07/12/2023    BUN 10 07/12/2023    CO2 27 07/12/2023    HGBA1C 12.5 (H) 07/12/2023       Assessment:       1. Follow-up exam    2. Type 2 diabetes mellitus with hyperglycemia, without long-term current use of insulin    3. Diabetic polyneuropathy associated with type 2 diabetes mellitus    4. Polyneuropathy    5. Class 2 severe obesity due to excess calories with serious comorbidity and body mass index (BMI) of 36.0 to 36.9 in adult    6. Microalbuminuria    7. Dyslipidemia    8. Diabetic polyneuropathy associated with type 2 diabetes mellitus    9. Anxiety and depression          Plan:     Shelley was seen today for follow-up.    Diagnoses and all orders for this visit:    Follow-up exam    Type 2 diabetes mellitus with hyperglycemia, without long-term current use of insulin  Comments:  100-150s  generally. Lowest seen 98. Highest seen 160.   Orders:  -     HEMOGLOBIN A1C; Future  -     pravastatin (PRAVACHOL) 20 MG tablet; Take 1 tablet (20 mg total) by mouth once daily.    Diabetic polyneuropathy associated with type 2 diabetes mellitus  -     gabapentin (NEURONTIN) 300 MG capsule; Take 1 capsule (300 mg total) by mouth 3 (three) times daily.    Polyneuropathy  -     VITAMIN B12; Future  -     FOLATE; Future  -     VITAMIN B1; Future    Class 2 severe obesity due to excess calories with serious comorbidity and body mass index (BMI) of 36.0 to 36.9 in adult    Microalbuminuria    Dyslipidemia  -     pravastatin (PRAVACHOL) 20 MG tablet; Take 1 tablet (20 mg total) by mouth once daily.    Diabetic polyneuropathy associated with type 2 diabetes mellitus  Comments:  gabapentin, needs foot exam with podiatry  Orders:  -     gabapentin (NEURONTIN) 300 MG capsule; Take 1 capsule (300 mg total) by mouth 3 (three) times daily.    Anxiety and depression  -     Ambulatory referral/consult to Psychiatry; Future        Health maintenance reviewed with patient.     Follow up in 8 weeks (on 10/26/2023).    Corey Hernandez MD  Internal Medicine / Primary Care  [facility]  8/28/2023

## 2023-08-29 ENCOUNTER — TELEPHONE (OUTPATIENT)
Dept: INTERNAL MEDICINE | Facility: CLINIC | Age: 63
End: 2023-08-29
Payer: COMMERCIAL

## 2023-08-29 DIAGNOSIS — G62.9 POLYNEUROPATHY: ICD-10-CM

## 2023-08-29 DIAGNOSIS — E53.8 VITAMIN B12 DEFICIENCY: Primary | ICD-10-CM

## 2023-08-29 RX ORDER — LANOLIN ALCOHOL/MO/W.PET/CERES
1000 CREAM (GRAM) TOPICAL DAILY
Qty: 90 TABLET | Refills: 3 | Status: SHIPPED | OUTPATIENT
Start: 2023-08-29

## 2023-08-29 NOTE — TELEPHONE ENCOUNTER
----- Message from Corey Hernandez MD sent at 8/29/2023 12:53 PM CDT -----  Let patient know b12 level is low. This may the reason why her neuropathy has been worsening. Start B12 1000 mcg daily. Prescription sent to pharmacy, should be able to find over the counter if pharmacy directs her to it.

## 2023-08-30 ENCOUNTER — PATIENT MESSAGE (OUTPATIENT)
Dept: INTERNAL MEDICINE | Facility: CLINIC | Age: 63
End: 2023-08-30
Payer: COMMERCIAL

## 2023-09-01 LAB — VIT B1 BLD-MCNC: 54 UG/L (ref 38–122)

## 2023-09-04 ENCOUNTER — PATIENT MESSAGE (OUTPATIENT)
Dept: INTERNAL MEDICINE | Facility: CLINIC | Age: 63
End: 2023-09-04
Payer: COMMERCIAL

## 2023-09-05 ENCOUNTER — OFFICE VISIT (OUTPATIENT)
Dept: DERMATOLOGY | Facility: CLINIC | Age: 63
End: 2023-09-05
Payer: COMMERCIAL

## 2023-09-05 ENCOUNTER — LAB VISIT (OUTPATIENT)
Dept: LAB | Facility: HOSPITAL | Age: 63
End: 2023-09-05
Payer: COMMERCIAL

## 2023-09-05 DIAGNOSIS — L40.9 PSORIASIS: Primary | ICD-10-CM

## 2023-09-05 DIAGNOSIS — L40.9 PSORIASIS: ICD-10-CM

## 2023-09-05 PROBLEM — E11.49 TYPE 2 DIABETES MELLITUS WITH NEUROLOGIC COMPLICATION: Status: ACTIVE | Noted: 2023-09-05

## 2023-09-05 LAB
HBV CORE AB SERPL QL IA: NORMAL
HBV SURFACE AG SERPL QL IA: NORMAL

## 2023-09-05 PROCEDURE — 99214 PR OFFICE/OUTPT VISIT, EST, LEVL IV, 30-39 MIN: ICD-10-PCS | Mod: S$GLB,,, | Performed by: STUDENT IN AN ORGANIZED HEALTH CARE EDUCATION/TRAINING PROGRAM

## 2023-09-05 PROCEDURE — 1160F PR REVIEW ALL MEDS BY PRESCRIBER/CLIN PHARMACIST DOCUMENTED: ICD-10-PCS | Mod: CPTII,S$GLB,, | Performed by: STUDENT IN AN ORGANIZED HEALTH CARE EDUCATION/TRAINING PROGRAM

## 2023-09-05 PROCEDURE — 3060F POS MICROALBUMINURIA REV: CPT | Mod: CPTII,S$GLB,, | Performed by: STUDENT IN AN ORGANIZED HEALTH CARE EDUCATION/TRAINING PROGRAM

## 2023-09-05 PROCEDURE — 86704 HEP B CORE ANTIBODY TOTAL: CPT

## 2023-09-05 PROCEDURE — 99214 OFFICE O/P EST MOD 30 MIN: CPT | Mod: S$GLB,,, | Performed by: STUDENT IN AN ORGANIZED HEALTH CARE EDUCATION/TRAINING PROGRAM

## 2023-09-05 PROCEDURE — 36415 COLL VENOUS BLD VENIPUNCTURE: CPT

## 2023-09-05 PROCEDURE — 1159F PR MEDICATION LIST DOCUMENTED IN MEDICAL RECORD: ICD-10-PCS | Mod: CPTII,S$GLB,, | Performed by: STUDENT IN AN ORGANIZED HEALTH CARE EDUCATION/TRAINING PROGRAM

## 2023-09-05 PROCEDURE — 3066F PR DOCUMENTATION OF TREATMENT FOR NEPHROPATHY: ICD-10-PCS | Mod: CPTII,S$GLB,, | Performed by: STUDENT IN AN ORGANIZED HEALTH CARE EDUCATION/TRAINING PROGRAM

## 2023-09-05 PROCEDURE — 3060F PR POS MICROALBUMINURIA RESULT DOCUMENTED/REVIEW: ICD-10-PCS | Mod: CPTII,S$GLB,, | Performed by: STUDENT IN AN ORGANIZED HEALTH CARE EDUCATION/TRAINING PROGRAM

## 2023-09-05 PROCEDURE — 1159F MED LIST DOCD IN RCRD: CPT | Mod: CPTII,S$GLB,, | Performed by: STUDENT IN AN ORGANIZED HEALTH CARE EDUCATION/TRAINING PROGRAM

## 2023-09-05 PROCEDURE — 3052F HG A1C>EQUAL 8.0%<EQUAL 9.0%: CPT | Mod: CPTII,S$GLB,, | Performed by: STUDENT IN AN ORGANIZED HEALTH CARE EDUCATION/TRAINING PROGRAM

## 2023-09-05 PROCEDURE — 3052F PR MOST RECENT HEMOGLOBIN A1C LEVEL 8.0 - < 9.0%: ICD-10-PCS | Mod: CPTII,S$GLB,, | Performed by: STUDENT IN AN ORGANIZED HEALTH CARE EDUCATION/TRAINING PROGRAM

## 2023-09-05 PROCEDURE — 99999 PR PBB SHADOW E&M-EST. PATIENT-LVL III: CPT | Mod: PBBFAC,,, | Performed by: STUDENT IN AN ORGANIZED HEALTH CARE EDUCATION/TRAINING PROGRAM

## 2023-09-05 PROCEDURE — 4010F ACE/ARB THERAPY RXD/TAKEN: CPT | Mod: CPTII,S$GLB,, | Performed by: STUDENT IN AN ORGANIZED HEALTH CARE EDUCATION/TRAINING PROGRAM

## 2023-09-05 PROCEDURE — 99999 PR PBB SHADOW E&M-EST. PATIENT-LVL III: ICD-10-PCS | Mod: PBBFAC,,, | Performed by: STUDENT IN AN ORGANIZED HEALTH CARE EDUCATION/TRAINING PROGRAM

## 2023-09-05 PROCEDURE — 3066F NEPHROPATHY DOC TX: CPT | Mod: CPTII,S$GLB,, | Performed by: STUDENT IN AN ORGANIZED HEALTH CARE EDUCATION/TRAINING PROGRAM

## 2023-09-05 PROCEDURE — 87340 HEPATITIS B SURFACE AG IA: CPT

## 2023-09-05 PROCEDURE — 1160F RVW MEDS BY RX/DR IN RCRD: CPT | Mod: CPTII,S$GLB,, | Performed by: STUDENT IN AN ORGANIZED HEALTH CARE EDUCATION/TRAINING PROGRAM

## 2023-09-05 PROCEDURE — 4010F PR ACE/ARB THEARPY RXD/TAKEN: ICD-10-PCS | Mod: CPTII,S$GLB,, | Performed by: STUDENT IN AN ORGANIZED HEALTH CARE EDUCATION/TRAINING PROGRAM

## 2023-09-05 RX ORDER — CLOBETASOL PROPIONATE 0.5 MG/G
CREAM TOPICAL 2 TIMES DAILY
Qty: 60 G | Refills: 3 | Status: SHIPPED | OUTPATIENT
Start: 2023-09-05

## 2023-09-05 RX ORDER — CLOBETASOL PROPIONATE 0.46 MG/ML
SOLUTION TOPICAL 2 TIMES DAILY
Qty: 50 ML | Refills: 2 | Status: SHIPPED | OUTPATIENT
Start: 2023-09-05

## 2023-09-05 NOTE — PROGRESS NOTES
Subjective:      Patient ID:  Shelley Levy is a 62 y.o. female who presents for   Chief Complaint   Patient presents with    Dermatitis     Shelley is a 61 yo female with PMHx DM2, obesity here for follow up tinea corporis. LOV 7/11/23 at which time patient started on topical ketoconazole and PO terbinafine. Today, patient reports resolution of tinea corporis. She continues on Lamisil per PCP for onychomycosis.     Additionally, she reports over 20 year history pruritic rash to R scalp, bilateral thighs. Getting worse and pruritus keeping her up at night. Prior diagnosis psoriasis, but no relief with prior treatments (topicals). Not using anything at home currently. Reports morning stiffness. Unable to specify which joints. No fam history of psoriasis. Mom with breast and ovarian cancer. No hx IBD, CHF, or demyelinating diseases.         Review of Systems   Constitutional:  Negative for fever and chills.   Gastrointestinal:  Positive for diarrhea (since taking insulin). Negative for constipation.   Musculoskeletal:  Positive for arthralgias. Negative for joint swelling.       Objective:   Physical Exam   Constitutional: No distress.   Neurological: She is alert and oriented to person, place, and time.   Skin:               Diagram Legend     Erythematous scaling macule/papule c/w actinic keratosis       Vascular papule c/w angioma      Pigmented verrucoid papule/plaque c/w seborrheic keratosis      Yellow umbilicated papule c/w sebaceous hyperplasia      Irregularly shaped tan macule c/w lentigo     1-2 mm smooth white papules consistent with Milia      Movable subcutaneous cyst with punctum c/w epidermal inclusion cyst      Subcutaneous movable cyst c/w pilar cyst      Firm pink to brown papule c/w dermatofibroma      Pedunculated fleshy papule(s) c/w skin tag(s)      Evenly pigmented macule c/w junctional nevus     Mildly variegated pigmented, slightly irregular-bordered macule c/w mildly atypical nevus       Flesh colored to evenly pigmented papule c/w intradermal nevus       Pink pearly papule/plaque c/w basal cell carcinoma      Erythematous hyperkeratotic cursted plaque c/w SCC      Surgical scar with no sign of skin cancer recurrence      Open and closed comedones      Inflammatory papules and pustules      Verrucoid papule consistent consistent with wart     Erythematous eczematous patches and plaques     Dystrophic onycholytic nail with subungual debris c/w onychomycosis     Umbilicated papule    Erythematous-base heme-crusted tan verrucoid plaque consistent with inflamed seborrheic keratosis     Erythematous Silvery Scaling Plaque c/w Psoriasis     See annotation    Onychodystrophy present.                 Assessment / Plan:        Psoriasis with possible psoriatic arthritis  Status: chronic disease flaring/not at treatment goal  Pink plaques well-demarcated with silvery scale and some excoriations-- occipital scalp, posterior neck, bilateral thighs involved. BSA 10%, IGA 2-3, high impact area scalp, a/w severe pruritus affecting QOL. Nails with onycholysis and yellowing, no pitting (being treated with PCP for onychomycosis- ddx onychomycosis v psoriatic nail changes)  - Will trial topicals but discussed that given thickness of plaques and BSA likely need systemic medication. Will try to get biologic approved next visit if not improved with topicals, will try IL-17 inhibitor and will further review SE next visit  - stat clobetasol solution to scalp, clobetasol cream to body (patient prefers cream to ointment, discussed can occlude with saran wrap) bid 2 weeks on, 1 week off  - Biologic initiation screening labs today. Recent reviewed/wnl HCV 7/2023, cbc and cmp 7/2023  - Reviewed this is chronic condition with waxing/waning course. Reviewed treatment options including topicals v systemic therapy. Reviewed association with psoriatic arthritis and metabolic syndrome. Encouraged follow up with PCP  Last a1c 8/2023:  8.7  Last lipid panel: 7/2023 TG elevated     Psoriasis  -     clobetasoL (TEMOVATE) 0.05 % cream; Apply topically 2 (two) times daily. Use two weeks then take a one week break. Repeat in 2 week intervals.  Dispense: 60 g; Refill: 3  -     clobetasoL (TEMOVATE) 0.05 % external solution; Apply topically 2 (two) times daily. Use two weeks then take a one week break. Repeat in 2 week intervals.  Dispense: 50 mL; Refill: 2  -     QUANTIFERON GOLD TB; Future; Expected date: 09/05/2023  -     Hepatitis B Surface Antigen; Future; Expected date: 09/05/2023  -     Hepatitis B Core Antibody, Total; Future; Expected date: 09/05/2023  -     HEPATITIS B SURFACE ANTIBODY; Future; Expected date: 09/05/2023           Follow up in about 2 months (around 11/5/2023) for Dr. Leroy Villalobos.

## 2023-09-07 ENCOUNTER — OFFICE VISIT (OUTPATIENT)
Dept: OPTOMETRY | Facility: CLINIC | Age: 63
End: 2023-09-07
Payer: COMMERCIAL

## 2023-09-07 DIAGNOSIS — H25.13 NUCLEAR SCLEROSIS OF BOTH EYES: ICD-10-CM

## 2023-09-07 DIAGNOSIS — H26.9 CORTICAL CATARACT OF BOTH EYES: ICD-10-CM

## 2023-09-07 DIAGNOSIS — H53.002 AMBLYOPIA EX ANOPSIA OF LEFT EYE: ICD-10-CM

## 2023-09-07 DIAGNOSIS — E11.3299 BACKGROUND DIABETIC RETINOPATHY ASSOCIATED WITH TYPE 2 DIABETES MELLITUS: Primary | ICD-10-CM

## 2023-09-07 DIAGNOSIS — H52.4 PRESBYOPIA OF BOTH EYES: ICD-10-CM

## 2023-09-07 DIAGNOSIS — H52.7 REFRACTIVE ERRORS: ICD-10-CM

## 2023-09-07 PROCEDURE — 99999 PR PBB SHADOW E&M-EST. PATIENT-LVL III: ICD-10-PCS | Mod: PBBFAC,,, | Performed by: OPTOMETRIST

## 2023-09-07 PROCEDURE — 99999 PR PBB SHADOW E&M-EST. PATIENT-LVL III: CPT | Mod: PBBFAC,,, | Performed by: OPTOMETRIST

## 2023-09-07 PROCEDURE — 99499 NO LOS: ICD-10-PCS | Mod: S$GLB,,, | Performed by: OPTOMETRIST

## 2023-09-07 PROCEDURE — 99499 UNLISTED E&M SERVICE: CPT | Mod: S$GLB,,, | Performed by: OPTOMETRIST

## 2023-09-08 NOTE — PROGRESS NOTES
HPI     diabetic eye exam            Comments: Diabetic eye exam.  Diagnosed about 2 months ago.  A1C at diagnosis of 12. Last A1C approx 8   (two weeks ago).   IDDM  + neuropathy - takes Gabapentin.  Broke last glasses prescribed.  Needs updated spectacle lens Rx.           Comments    Patient's age: 62 y.o. Female   Occupation:  Homemaker   Approximate date of last eye examination: 11/23/2015  Name of last eye doctor seen: Dr. Slade   City/State: [place], [place]  Wears glasses? No     If yes, wears  Full-time or part-time?  N/A  Present glasses are: Bifocal, SV Distance, SV Reading?  OTC Readers - most   recent glasses broken   Approximate age of present glasses:  N/A  Got new glasses following last exam, or subsequently?:  NO   Any problem with VA with glasses?  N/A  Wears CLs?: No  Headaches?  No  Eye pain/discomfort?  No                                                                                    Flashes?  No  Floaters?  Yes  Diplopia/Double vision?  No  Patient's Ocular History:         Any eye surgeries? No         Any eye injury?  No         Any treatment for eye disease?  No.  Family history of eye disease?  Macular Degeneration (m grandmother)  Significant patient medical history:Grandmother         1. Diabetes?  Diabetes       If yes, IDDM or NIDDM? Type 2  IDDM   2. HBP?  No              3. Other (describe):  No   ! OTC eyedrops currently using:  Thera Tears   ! Prescription eye meds currently using:  No   ! Any history of allergy/adverse reaction to any eye meds used   previously?  No   ! Any history of allergy/adverse reaction to eyedrops used during prior   eye exam(s)? No   ! Any history of allergy/adverse reaction to Novacaine or similar meds?   No   ! Any history of allergy/adverse reaction to Epinephrine or similar meds?   No    ! Patient okay with use of anesthetic eyedrops to check eye pressure?  Y    ! Patient okay with use of eyedrops to dilate pupils today?  Y   !   "Allergies/Medications/Medical History/Family History reviewed today?    Y      PD =   61/57  Desired reading distance =   13" (remeasured - reads with reading material   on tabletop))                                                                       Last edited by Jason Slade OD on 9/7/2023  1:56 PM.            Assessment /Plan     For exam results, see Encounter Report.    1. Background diabetic retinopathy associated with type 2 diabetes mellitus        2. Nuclear sclerosis of both eyes        3. Cortical cataract of both eyes        4. Amblyopia ex anopsia of left eye        5. Refractive errors        6. Presbyopia of both eyes                       Type 2 diabetes with mild background diabetic retinopathy in both eyes.  Mrs. Levy reports poor control of A1C level.  Early/trace nuclear sclerosis of lens of both eyes, and early peripheral cortical cataract.  No need for cataract surgery in either eye.    History of amblyopia in the left eye.  Difficult refraction in the left eye.  Astigmatic refractive error in the right eye.  Best-corrected VA of 20/25+2 in the right eye, and 20/125 in the left eye with spherical over-refraction.  Presbyopia consistent with age.    New spectacle lens Rx issued for use as desired.    Return in SIX MONTHS for reassessment of diabetic retinal changes - or prior, if any problems noted in the interim.     "

## 2023-09-08 NOTE — PATIENT INSTRUCTIONS
Type 2 diabetes with mild background diabetic retinopathy in both eyes.  Mrs. Levy reports poor control of A!C level   Early/trace nuclear sclerosis of lens of both eyes, and early peripheral cortical cataract.  No need for cataract surgery in either eye.    History of amblyopia in the left eye.  Difficult refraction in the left eye.  Astigmatic refractive error in the right eye.  Best-corrected VA of 20/25+2 in the right eye, and 20/125 in the left eye with spherical over-refraction.  Presbyopia consistent with age.    New spectacle lens Rx issued for use as desired.    Return in SIX MONTHS for reassessment of diabetic retinal changes - or prior, if any problems noted in the interim.

## 2023-09-12 ENCOUNTER — TELEPHONE (OUTPATIENT)
Dept: INTERNAL MEDICINE | Facility: CLINIC | Age: 63
End: 2023-09-12
Payer: COMMERCIAL

## 2023-09-12 NOTE — TELEPHONE ENCOUNTER
----- Message from Corey Hernandez MD sent at 7/25/2023  9:08 AM CDT -----  I'd like patient to be seen for nutritional information, newly diagnosed diabetic.

## 2023-09-12 NOTE — TELEPHONE ENCOUNTER
Patient referred by Dr. Hernandez for Health coaching (new Diabetic, lifestyle changes).  Called patient and gave an explanation about Health Coaching program and invited participation.   Patient states she would like to participate.  Set appointment for 9.20.23   Gave direct contact number to call if she has any questions [phone].   Alethea Ramires RN  Health

## 2023-09-13 ENCOUNTER — HOSPITAL ENCOUNTER (OUTPATIENT)
Dept: RADIOLOGY | Facility: HOSPITAL | Age: 63
Discharge: HOME | End: 2023-09-13
Attending: INTERNAL MEDICINE
Payer: COMMERCIAL

## 2023-09-13 DIAGNOSIS — Z12.31 BREAST CANCER SCREENING BY MAMMOGRAM: ICD-10-CM

## 2023-09-13 PROCEDURE — 77067 MAMMO DIGITAL SCREENING BILAT WITH TOMO: ICD-10-PCS | Mod: 26,,, | Performed by: RADIOLOGY

## 2023-09-13 PROCEDURE — 77063 BREAST TOMOSYNTHESIS BI: CPT | Mod: 26,,, | Performed by: RADIOLOGY

## 2023-09-13 PROCEDURE — 77063 MAMMO DIGITAL SCREENING BILAT WITH TOMO: ICD-10-PCS | Mod: 26,,, | Performed by: RADIOLOGY

## 2023-09-13 PROCEDURE — 77067 SCR MAMMO BI INCL CAD: CPT | Mod: TC

## 2023-09-13 PROCEDURE — 77067 SCR MAMMO BI INCL CAD: CPT | Mod: 26,,, | Performed by: RADIOLOGY

## 2023-09-20 ENCOUNTER — CLINICAL SUPPORT (OUTPATIENT)
Dept: INTERNAL MEDICINE | Facility: CLINIC | Age: 63
End: 2023-09-20
Payer: COMMERCIAL

## 2023-09-20 VITALS — HEIGHT: 58 IN | BODY MASS INDEX: 34.85 KG/M2 | WEIGHT: 166 LBS

## 2023-09-20 DIAGNOSIS — E11.65 TYPE 2 DIABETES MELLITUS WITH HYPERGLYCEMIA, WITHOUT LONG-TERM CURRENT USE OF INSULIN: Primary | ICD-10-CM

## 2023-09-20 PROCEDURE — 99999 PR PBB SHADOW E&M-EST. PATIENT-LVL I: ICD-10-PCS | Mod: PBBFAC,,,

## 2023-09-20 PROCEDURE — 99999 PR PBB SHADOW E&M-EST. PATIENT-LVL I: CPT | Mod: PBBFAC,,,

## 2023-09-20 NOTE — PROGRESS NOTES
Date:  9.20.23                    Time: 1330  Initial Health  Contact - [x]Clinic visit  []  Phone Visit  ASSEMENT: Information obtained through combination of chart review prior to seeing patient, patient self-report.   Primary Referral diagnosis/reason for referral:         New Diabetic, lifestyle changes   (See physician progress note for complete list of diagnoses.)  PCP: Dr. Hernandez  Referent :  [x] PCP       [] Transition Navigator    []  E.GAY    []  APRN  []  Other:     Supports:          Preferred Learning Style  (may be a combination)  [x]  Visual (pictures/ video,demonstrations, reading or writing)     [x] Auditory/ Listening   []  Reading    [x]  Hands-on/ Demonstration (doing, touching and interacting (kinesthetic)   [x]  1:1    []  Group        []  Alone       []  No preference   []  Combination  []  Other:         Presenting issues from patients point of view:  [x]  Improve nutrition/increase healthy choices.                    [x]  Improve /maintain current functioning.  []  Obtain and maintain healthy blood pressure.                  []  Stop smoking.  [x]  Improve weight management.                                       []  Lower cholesterol.  [x]  Improve blood glucose management.                            []  Improve breathing.  [x]  Increase energy level.                                                      []  Increase/maintain independence.   [x]  Improve sleep.                                                                [x]  Decrease stress.  [x]  Improve pain management. neuropathy                                             [x]  Improve mood. grumpy  []  Other:                  Pt. Education Level: High School   Disease specific education services attended: Diabetes class x 1       Patient Employed:  []yes    [x] No  Where:   Days and Hours:                Current Self-help Behaviors  [x]  Checks glucose level 3-4       times a day.  [x]  Tries to modify meals so more  healthy.  [x]  Exercises by taking care of         []  Checks BP        times a       (insert frequency)  [x]  Weighs self 1 x week        (how often)  [x]  Takes all medications as prescribed.  [x]  Rarely misses health care appointments.  []  Sleeps 8 hours without waking more than twice.  [x]  Consistently wears/uses functioning aids as recommended  (ie:  Contacts [] , glasses [x] , hearing  Aid [] , prosthesis [] ,  Walker [] ,  Wheelchair []  etc.)  [x]  Regularly engages in stress management activities I.e.: prayer  []  Quit smoking   [x]  Purposefully educates self about health issues.  []  Pt did bring glucose log with him/her.  []  Other  (explain)           []  Comments:       [x]  Reported Blood Sugar Readings:          Health screenings  Last PCP visit:   .                          MM..                                                         Colon: ordered  Lipids: 7.12.  CMP: 7.12.  HgA1C: 7.12.  Urine Microalbumin-Creatinine:   7.25.         Eye Exam:   9..  Foot Exam:   8..23       Strengths:  []  Confident                       [x]  Determined/persistent           [x]  Enthusiastic         [x]  Good problem solving           [x]  Good self-control                   [x]  Hard working        [x]  Hopeful/optimistic                  [x]  Intelligent                                [x]  Self aware  [x]  Creative                                 [x]  Flexible          []  Sense of humor                     [x]  Open/willing          [x]  Spiritual                                  [x] Values health    [x]  Successful overcoming difficult challenges in the past.     [x]  Pos support network  [x]   Health literate (The degree to which individuals have the capacity to obtain, process, and    understand basic health information and services needed to make appropriate health decisions.- Dept. of Health and Human services.)  []   Other Comments:             Change  Markers  Scale 0-10 with 10 representing most Ready 0 least ready, 10 most important 0 least important 10 most confident 0 least confident.     [] NA at this time.   Readiness: 9;  Importance: 10;  Confidence: 10        INTERVENTIONS:  [x] Given an explanation about health coaching program and invited participation.  [x] Listened reflectively; provided support, encouragement, and validation; respected  and maintained patient self-direction; explored pros/cons of change; personalized health risks of maintaining the status quo; and facilitated recognition of discrepancy between current behaviors and patients goals and values.  [x] Assessed stage of change and employed appropriate motivational interviewing strategies to facilitate movement toward the next stage of change and healthy behavior modification.  [x] Normalized occurrence of relapse, helped patient recognize outcome of new self-learning as a result of the relapse such as triggers, and helped focus on factors to reduce chances of returning to previous behaviors .  [x] Used readiness scale ratings to guide assessment of importance and confidence of successfully reaching goals.  [x] Assisted patient to develop goal, action plan, and address potential barriers to goal     achievement.  [] Patient was given a new (insert glucose meter or other supplies), taught to use, and      successfully demonstrated ability to carry out essential steps of process.    [] Rx for ( monitor/supplies, pen needles, etc.) was obtained.  [x] Provided educational review, written materials/handouts (Meal Planning Counting Carbs, Healthy Plate, 10 Rules for Eating Safely for Life, 10 Tips to Cutting Your Cravings).   [x] Topics discussed/provided:    GI of foods and how it affects your metabolism and helps you burn fat, carb counting    [x] Facilitated completion of needed exams and screenings by reviewing Diabetic/Health Maintenance Report Card with patient.  [x] Provided pt with my  business card.  [x] Informed of/reviewed how to access health  and after hours assistance.  [x] Discussed, planned, and scheduled future contacts.  [x]Challenges/Barriers identified discussed as identified by patient.  [x] Needs identified by this Health :    Education and support     [] Other:            For additional care management support patient was referred to:        []  Community resources for                        []  Medication assistance programs               []  Dietician              []  Diabetes  Boot Camp                                        []  Mental health services                           []                  []  Diabetes [place]                                              []  Home health services                                []  Complex case management              []  PCP/covering provider due to                 []  Emergency Dept.                                  []  GYN              []  Optometrist/ Ophthalmologist                          []  Podiatrist                                                      [x]  N/A        []  Other:           RESPONSE/IMPRESSION  Behavior is consistent with the following stage of change:  []  Pre-contemplation  []  Contemplation  [x]  Preparation  []  Action  []  Maintenance  []  Relapse    Level of participation:  []  Refused to participate  []  Guarded / resistant  []  Passive participant  [x]  Active participant/interactive  [x]  Interested/receptive  [x]  Self-motivated  []  Other          Goal developed by patient today:   Eat healthier meals   Smaller portions  Keep carbs less than 30 gm/ meal and 100 carbs/ day  Keep sugars less then 25 gms/ day      Plan (needed actions to accomplish goal):          [x] Pt will work on action plan as stated above.        [x] Pt will follow up with Health  on 10.17.23.       [x] Health  will remain available.  [x] Health  will maintain regular contacts with patient to  educate, support, encourage; help problem-solve; assist with development of self-advocacy/increasing independent health management; and provide resources as needed.  [] Pt has declined Health  Program at this time. Provided pt with Health  Program information should they decide to participate at a later time.        [] Pt to facilitate contact with Health        [] Health  to facilitate contact with patient.        [] Other:          Notes:  Met with patient and her . She was newly dx with DM. She was seen by the diabetic educator. Her A1c was 12.5 now is down to 8.7. She was at 200 lbs at her highest weight and is now down to 166 lbs.   Goals set by patient and HC will continue to work with patient to assist to reach her goals.    Best Method of Contact:  [x] email:   [x] Phone:   [] Mail  [] Office     Alethea Ramires RN HC

## 2023-09-21 ENCOUNTER — PATIENT MESSAGE (OUTPATIENT)
Dept: DERMATOLOGY | Facility: CLINIC | Age: 63
End: 2023-09-21
Payer: COMMERCIAL

## 2023-09-24 DIAGNOSIS — E11.65 TYPE 2 DIABETES MELLITUS WITH HYPERGLYCEMIA, WITHOUT LONG-TERM CURRENT USE OF INSULIN: ICD-10-CM

## 2023-09-24 NOTE — TELEPHONE ENCOUNTER
No care due was identified.  [facility] Embedded Care Due Messages. Reference number: [id number].   9/24/2023 1:28:07 AM CDT

## 2023-09-25 RX ORDER — LANCETS
EACH MISCELLANEOUS
Qty: 400 EACH | Refills: 3 | Status: SHIPPED | OUTPATIENT
Start: 2023-09-25

## 2023-09-25 NOTE — TELEPHONE ENCOUNTER
Refill Decision Note   Shelley Levy  is requesting a refill authorization.  Brief Assessment and Rationale for Refill:  Approve     Medication Therapy Plan:         Comments:     Note composed:8:56 AM 09/25/2023

## 2023-10-04 ENCOUNTER — PATIENT MESSAGE (OUTPATIENT)
Dept: INTERNAL MEDICINE | Facility: CLINIC | Age: 63
End: 2023-10-04
Payer: COMMERCIAL

## 2023-10-05 ENCOUNTER — TELEPHONE (OUTPATIENT)
Dept: INTERNAL MEDICINE | Facility: CLINIC | Age: 63
End: 2023-10-05
Payer: COMMERCIAL

## 2023-10-05 DIAGNOSIS — R60.0 BILATERAL LOWER EXTREMITY EDEMA: Primary | ICD-10-CM

## 2023-10-05 DIAGNOSIS — R06.01 ORTHOPNEA: ICD-10-CM

## 2023-10-05 RX ORDER — FUROSEMIDE 20 MG/1
20 TABLET ORAL DAILY
Qty: 20 TABLET | Refills: 0 | Status: SHIPPED | OUTPATIENT
Start: 2023-10-05 | End: 2023-11-27

## 2023-10-10 ENCOUNTER — PATIENT MESSAGE (OUTPATIENT)
Dept: INTERNAL MEDICINE | Facility: CLINIC | Age: 63
End: 2023-10-10
Payer: COMMERCIAL

## 2023-10-10 NOTE — TELEPHONE ENCOUNTER
Called and spoke to patient.     Pt reports no response to Lasix.  Started Lasix  4 days ago. Reports no change in edema or increased urination.    Presently drinks 2 bottles water a day and  2 iced tea or fruit punch a day.    Advised patient to consume more water and elevate feet when not ambulating.    Message forwarded to Dr. Hernandez for review and guidance.

## 2023-10-12 ENCOUNTER — TELEPHONE (OUTPATIENT)
Dept: OPTOMETRY | Facility: CLINIC | Age: 63
End: 2023-10-12
Payer: COMMERCIAL

## 2023-10-17 ENCOUNTER — PATIENT OUTREACH (OUTPATIENT)
Dept: ADMINISTRATIVE | Facility: HOSPITAL | Age: 63
End: 2023-10-17
Payer: COMMERCIAL

## 2023-10-17 ENCOUNTER — CLINICAL SUPPORT (OUTPATIENT)
Dept: INTERNAL MEDICINE | Facility: CLINIC | Age: 63
End: 2023-10-17
Payer: COMMERCIAL

## 2023-10-17 ENCOUNTER — PATIENT MESSAGE (OUTPATIENT)
Dept: ADMINISTRATIVE | Facility: HOSPITAL | Age: 63
End: 2023-10-17
Payer: COMMERCIAL

## 2023-10-17 VITALS
HEIGHT: 58 IN | DIASTOLIC BLOOD PRESSURE: 60 MMHG | WEIGHT: 162.5 LBS | SYSTOLIC BLOOD PRESSURE: 110 MMHG | BODY MASS INDEX: 34.11 KG/M2

## 2023-10-17 DIAGNOSIS — E11.65 TYPE 2 DIABETES MELLITUS WITH HYPERGLYCEMIA, WITHOUT LONG-TERM CURRENT USE OF INSULIN: Primary | ICD-10-CM

## 2023-10-17 PROCEDURE — 99999 PR PBB SHADOW E&M-EST. PATIENT-LVL II: ICD-10-PCS | Mod: PBBFAC,,,

## 2023-10-17 PROCEDURE — 99999 PR PBB SHADOW E&M-EST. PATIENT-LVL II: CPT | Mod: PBBFAC,,,

## 2023-10-17 NOTE — PROGRESS NOTES
Health Maintenance Due   Topic Date Due    Foot Exam  Never done    Eye Exam  Never done    TETANUS VACCINE  Never done    Colorectal Cancer Screening  Never done    Shingles Vaccine (1 of 2) Never done    Influenza Vaccine (1) Never done    COVID-19 Vaccine (7 - 2023-24 season) 09/01/2023      Chart reviewed. Triggered LINKS. Updated Care Everywhere. Sent patient a portal message asking patient to schedule overdue health maintenance screenings. Patient has new patient appt 10/17/23 with Dr. Corey Heranndez.     [facility]  Population Health Care Coordinator  Primary Care Team

## 2023-10-17 NOTE — PROGRESS NOTES
Health  Follow-Up Note   [x] Office  [] Phone  Notes from previous session reviewed.   [x] Previous Session Goals unchanged.   [] Patient/Caregiver Change in Goals.  Goals added or changed by Patient/Caregiver since program participation:  Continue same plan  Cut metformin to one am and one pm per Dr. Hernandez do to diarrhea then to one per day then stop if diarrhea isn't relieved.  Call if continues f/u appt on 10.23.23.        Additional/Changed support that patient/caregiver has experienced/sought?  (Indicate readiness, support from family, friends, others, community groups, etc)       Additional/Changed obstacles that could prevent patient/caregiver from reaching their goals?   Feeling some weakness    Feedback provided:   Praised for good job down 3.5 lbs    Diagnostic values/Desriptors for follow-up as needed for chronic condition(s)   Weight: 73.7 kg 162.48 lb  Blood Glucose:   Other:some weakness and diarrhea    Interventions:   Health  listened reflectively, validated thoughts and feelings, offered support and encouragement.   Allowed patient to express themselves in a non-biased atmosphere.  Health  assisted pt to problem-solve obstacles such as being in a challenging environment and dealing with these challenges.   Motivational Interviewed interventions utilized (OARS).   Patient responded favorably to interventions and remained actively engaged in the session.   Health  will remain available and connected for patient by phone and/or office visits.   Positive reinforcement, emotional support and encouragement provided.   Focused Education: MI    Plan:  [x] Pt will work on goals as stated above.   [x] Pt will contact Health  for any questions, concerns or needs.  [x] Pt will follow up with Health  in office on  11.27.23.  [] Pt will follow up with Health  on phone in:        [x] Health  will remain available.   [] Health  will contact patient by phone  in:        [] Health  will consult:        [] Health  will inform Provider via EPIC messaging.     Impression:  1. Behavior is consistent with Action Stage of Change.   2. Participation level:       [x] Receptive      [x] Interactive      [] Guarded and Resistant      [x] Self Motivated      [] Refused/Declined to participate   3. [x] Pt voiced understanding of all information presented.       [] Pt voiced needing further information/education. This will be arranged.       [x] Pt would benefit from further education/information as identified by this health . This will be arranged.     Alethea Ramires RN HC

## 2023-10-18 ENCOUNTER — TELEPHONE (OUTPATIENT)
Dept: INTERNAL MEDICINE | Facility: CLINIC | Age: 63
End: 2023-10-18

## 2023-10-18 DIAGNOSIS — M71.22 POPLITEAL CYST, LEFT: Primary | ICD-10-CM

## 2023-10-19 ENCOUNTER — HOSPITAL ENCOUNTER (OUTPATIENT)
Dept: CARDIOLOGY | Facility: HOSPITAL | Age: 63
Discharge: HOME | End: 2023-10-19
Attending: INTERNAL MEDICINE
Payer: COMMERCIAL

## 2023-10-19 ENCOUNTER — OFFICE VISIT (OUTPATIENT)
Dept: OPTOMETRY | Facility: CLINIC | Age: 63
End: 2023-10-19
Payer: COMMERCIAL

## 2023-10-19 VITALS
HEART RATE: 63 BPM | WEIGHT: 166 LBS | DIASTOLIC BLOOD PRESSURE: 80 MMHG | HEIGHT: 58 IN | BODY MASS INDEX: 34.85 KG/M2 | SYSTOLIC BLOOD PRESSURE: 128 MMHG

## 2023-10-19 DIAGNOSIS — H53.002 AMBLYOPIA EX ANOPSIA OF LEFT EYE: ICD-10-CM

## 2023-10-19 DIAGNOSIS — H53.8 BLURRED VISION, BILATERAL: Primary | ICD-10-CM

## 2023-10-19 DIAGNOSIS — R06.01 ORTHOPNEA: ICD-10-CM

## 2023-10-19 DIAGNOSIS — H26.9 CORTICAL CATARACT OF BOTH EYES: ICD-10-CM

## 2023-10-19 DIAGNOSIS — R60.0 BILATERAL LOWER EXTREMITY EDEMA: ICD-10-CM

## 2023-10-19 DIAGNOSIS — H25.13 NUCLEAR SCLEROSIS OF BOTH EYES: ICD-10-CM

## 2023-10-19 DIAGNOSIS — H52.7 REFRACTIVE ERRORS: ICD-10-CM

## 2023-10-19 DIAGNOSIS — E11.3299 BACKGROUND DIABETIC RETINOPATHY ASSOCIATED WITH TYPE 2 DIABETES MELLITUS: ICD-10-CM

## 2023-10-19 LAB
ASCENDING AORTA: 3.3 CM
AV INDEX (PROSTH): 0.84
AV MEAN GRADIENT: 3 MMHG
AV PEAK GRADIENT: 5 MMHG
AV VALVE AREA BY VELOCITY RATIO: 2.27 CM²
AV VALVE AREA: 2.64 CM²
AV VELOCITY RATIO: 0.72
BSA FOR ECHO PROCEDURE: 1.75 M2
CV ECHO LV RWT: 0.34 CM
DOP CALC AO PEAK VEL: 1.16 M/S
DOP CALC AO VTI: 22.62 CM
DOP CALC LVOT AREA: 3.1 CM2
DOP CALC LVOT DIAMETER: 2 CM
DOP CALC LVOT PEAK VEL: 0.84 M/S
DOP CALC LVOT STROKE VOLUME: 59.75 CM3
DOP CALCLVOT PEAK VEL VTI: 19.03 CM
E WAVE DECELERATION TIME: 221.82 MSEC
E/A RATIO: 1.03
E/E' RATIO: 11.87 M/S
ECHO LV POSTERIOR WALL: 0.74 CM (ref 0.6–1.1)
FRACTIONAL SHORTENING: 34 % (ref 28–44)
INTERVENTRICULAR SEPTUM: 0.81 CM (ref 0.6–1.1)
LA MAJOR: 4.74 CM
LA MINOR: 4.74 CM
LA WIDTH: 3.65 CM
LEFT ATRIUM SIZE: 3.66 CM
LEFT ATRIUM VOLUME INDEX MOD: 31.9 ML/M2
LEFT ATRIUM VOLUME INDEX: 32.2 ML/M2
LEFT ATRIUM VOLUME MOD: 53.27 CM3
LEFT ATRIUM VOLUME: 53.82 CM3
LEFT INTERNAL DIMENSION IN SYSTOLE: 2.86 CM (ref 2.1–4)
LEFT VENTRICLE DIASTOLIC VOLUME INDEX: 51.45 ML/M2
LEFT VENTRICLE DIASTOLIC VOLUME: 85.92 ML
LEFT VENTRICLE MASS INDEX: 62 G/M2
LEFT VENTRICLE SYSTOLIC VOLUME INDEX: 18.7 ML/M2
LEFT VENTRICLE SYSTOLIC VOLUME: 31.2 ML
LEFT VENTRICULAR INTERNAL DIMENSION IN DIASTOLE: 4.36 CM (ref 3.5–6)
LEFT VENTRICULAR MASS: 103.39 G
LV LATERAL E/E' RATIO: 9.89 M/S
LV SEPTAL E/E' RATIO: 14.83 M/S
MV PEAK A VEL: 0.86 M/S
MV PEAK E VEL: 0.89 M/S
MV STENOSIS PRESSURE HALF TIME: 64.33 MS
MV VALVE AREA P 1/2 METHOD: 3.42 CM2
RA MAJOR: 3.72 CM
RA PRESSURE ESTIMATED: 3 MMHG
RA WIDTH: 3.16 CM
RIGHT VENTRICULAR END-DIASTOLIC DIMENSION: 3.58 CM
SINUS: 2.69 CM
STJ: 2.42 CM
TDI LATERAL: 0.09 M/S
TDI SEPTAL: 0.06 M/S
TDI: 0.08 M/S
TRICUSPID ANNULAR PLANE SYSTOLIC EXCURSION: 1.93 CM
Z-SCORE OF LEFT VENTRICULAR DIMENSION IN END DIASTOLE: -0.68
Z-SCORE OF LEFT VENTRICULAR DIMENSION IN END SYSTOLE: -0.09

## 2023-10-19 PROCEDURE — 93306 TTE W/DOPPLER COMPLETE: CPT

## 2023-10-19 PROCEDURE — 99499 NO LOS: ICD-10-PCS | Mod: S$GLB,,, | Performed by: OPTOMETRIST

## 2023-10-19 PROCEDURE — 93306 ECHO (CUPID ONLY): ICD-10-PCS | Mod: 26,,, | Performed by: INTERNAL MEDICINE

## 2023-10-19 PROCEDURE — 99999 PR PBB SHADOW E&M-EST. PATIENT-LVL III: ICD-10-PCS | Mod: PBBFAC,,, | Performed by: OPTOMETRIST

## 2023-10-19 PROCEDURE — 99499 UNLISTED E&M SERVICE: CPT | Mod: S$GLB,,, | Performed by: OPTOMETRIST

## 2023-10-19 PROCEDURE — 99999 PR PBB SHADOW E&M-EST. PATIENT-LVL III: CPT | Mod: PBBFAC,,, | Performed by: OPTOMETRIST

## 2023-10-19 PROCEDURE — 93306 TTE W/DOPPLER COMPLETE: CPT | Mod: 26,,, | Performed by: INTERNAL MEDICINE

## 2023-10-20 NOTE — PROGRESS NOTES
HPI    Patient in for progress check - problems with new (bifocal) lenses - finds   today that distance VA better with the glasses on when she tilts her head   forward.    Being followed for (diagnosis):   Recheck eyeglasses    Date last seen:  09/07/2023    Doctor last seen:  Dr. Slade    Prescribed eye medications(s) using:  Yes,     OTC eye medication(s) using:  Yes, Thera Tears    Signs/symptoms of condition resolved/better/stable/worse?:  Unresolved.   Blurry vision with glasses on and off. Distance is off and seeing shades   of white. Sensitivity to light is consistent.        3:47 pm   10/19/2023            Last edited by Jason Slade OD on 10/19/2023  4:01 PM.            Assessment /Plan     For exam results, see Encounter Report.    1. Blurred vision, bilateral        2. Nuclear sclerosis of both eyes        3. Cortical cataract of both eyes        4. Amblyopia ex anopsia of left eye        5. Refractive errors        6. Background diabetic retinopathy associated with type 2 diabetes mellitus                       Poorly controlled Type 2 diabetes with prior finding of mild background diabetic retinopathy in both eyes.  Mrs. Levy reports poor control of A1C level.  Priro finding of early/trace nuclear sclerosis of lens of both eyes, and early peripheral cortical cataract.     History of amblyopia in the left eye, secondary to anisometropia.      Refraction done at last visit.  Best-corrected VA of 20/25+2 in the right eye, and 20/125 in the left eye with spherical over-refraction on that date  Presbyopia consistent with age.    Spectacle lens Rx issued at the last visit..  Ms. Levy returns today with report of blurred vision in the right eye with the new spectacle lens Rx.(20/70 in the right eye with the new spectacle lens).  Rechecked refraction.  Note myopic shift in the right eye, as compared to the last visit, with best-corrected VA of 20/25-1+1 today.  Checked blood glucose level in  office today (216).  Discussed with Ms. Levy.     Defer issuance of new spectacle lens Rx.   Advised to take measures to better control blood glucose level.  Check regularly.    Return in several days for recheck of refraction.  Will check blood glucose level again on that date prior to refraction.

## 2023-10-20 NOTE — PATIENT INSTRUCTIONS
Poorly controlled Type 2 diabetes with prior finding of mild background diabetic retinopathy in both eyes.  Mrs. Levy reports poor control of A1C level.  Priro finding of early/trace nuclear sclerosis of lens of both eyes, and early peripheral cortical cataract.     History of amblyopia in the left eye, secondary to anisometropia.      Refraction done at last visit.  Best-corrected VA of 20/25+2 in the right eye, and 20/125 in the left eye with spherical over-refraction on that date  Presbyopia consistent with age.    Spectacle lens Rx issued at the last visit..  Ms. Levy returns today with report of blurred vision in the right eye with the new spectacle lens Rx.(20/70 in the right eye with the new spectacle lens).  Rechecked refraction.  Note myopic shift in the right eye, as compared to the last visit, with best-corrected VA of 20/25-1+1 today.  Checked blood glucose level in office today (216).  Discussed with Ms. Levy.     Defer issuance of new spectacle lens Rx.   Advised to take measures to better control blood glucose level.  Check regularly.    Return in several days for recheck of refraction.  Will check blood glucose level again on that date prior to refraction.

## 2023-10-23 ENCOUNTER — IMMUNIZATION (OUTPATIENT)
Dept: INTERNAL MEDICINE | Facility: CLINIC | Age: 63
End: 2023-10-23
Payer: COMMERCIAL

## 2023-10-23 ENCOUNTER — OFFICE VISIT (OUTPATIENT)
Dept: INTERNAL MEDICINE | Facility: CLINIC | Age: 63
End: 2023-10-23
Payer: COMMERCIAL

## 2023-10-23 ENCOUNTER — LAB VISIT (OUTPATIENT)
Dept: LAB | Facility: HOSPITAL | Age: 63
End: 2023-10-23
Attending: STUDENT IN AN ORGANIZED HEALTH CARE EDUCATION/TRAINING PROGRAM
Payer: COMMERCIAL

## 2023-10-23 VITALS
HEART RATE: 82 BPM | DIASTOLIC BLOOD PRESSURE: 62 MMHG | OXYGEN SATURATION: 97 % | BODY MASS INDEX: 33.87 KG/M2 | WEIGHT: 161.38 LBS | SYSTOLIC BLOOD PRESSURE: 112 MMHG | HEIGHT: 58 IN

## 2023-10-23 DIAGNOSIS — E53.8 VITAMIN B12 DEFICIENCY: ICD-10-CM

## 2023-10-23 DIAGNOSIS — E11.65 TYPE 2 DIABETES MELLITUS WITH HYPERGLYCEMIA, WITHOUT LONG-TERM CURRENT USE OF INSULIN: ICD-10-CM

## 2023-10-23 DIAGNOSIS — G62.9 POLYNEUROPATHY: ICD-10-CM

## 2023-10-23 DIAGNOSIS — L40.9 PSORIASIS: ICD-10-CM

## 2023-10-23 DIAGNOSIS — Z09 FOLLOW-UP EXAM: Primary | ICD-10-CM

## 2023-10-23 LAB
ALBUMIN SERPL BCP-MCNC: 3.9 G/DL (ref 3.5–5.2)
ALP SERPL-CCNC: 74 U/L (ref 55–135)
ALT SERPL W/O P-5'-P-CCNC: 48 U/L (ref 10–44)
ANION GAP SERPL CALC-SCNC: 11 MMOL/L (ref 8–16)
AST SERPL-CCNC: 28 U/L (ref 10–40)
BASOPHILS # BLD AUTO: 0.05 K/UL (ref 0–0.2)
BASOPHILS NFR BLD: 0.7 % (ref 0–1.9)
BILIRUB SERPL-MCNC: 0.4 MG/DL (ref 0.1–1)
BUN SERPL-MCNC: 23 MG/DL (ref 6–20)
CALCIUM SERPL-MCNC: 9.3 MG/DL (ref 8.7–10.5)
CHLORIDE SERPL-SCNC: 104 MMOL/L (ref 95–110)
CO2 SERPL-SCNC: 24 MMOL/L (ref 23–29)
CREAT SERPL-MCNC: 0.8 MG/DL (ref 0.5–1.4)
DIFFERENTIAL METHOD: ABNORMAL
EOSINOPHIL # BLD AUTO: 0.1 K/UL (ref 0–0.5)
EOSINOPHIL NFR BLD: 1.6 % (ref 0–8)
ERYTHROCYTE [DISTWIDTH] IN BLOOD BY AUTOMATED COUNT: 12.7 % (ref 11.5–14.5)
EST. GFR  (NO RACE VARIABLE): >60 ML/MIN/1.73 M^2
ESTIMATED AVG GLUCOSE: 123 MG/DL (ref 68–131)
GLUCOSE SERPL-MCNC: 86 MG/DL (ref 70–110)
HBA1C MFR BLD: 5.9 % (ref 4–5.6)
HCT VFR BLD AUTO: 35.7 % (ref 37–48.5)
HGB BLD-MCNC: 12 G/DL (ref 12–16)
IMM GRANULOCYTES # BLD AUTO: 0.02 K/UL (ref 0–0.04)
IMM GRANULOCYTES NFR BLD AUTO: 0.3 % (ref 0–0.5)
LYMPHOCYTES # BLD AUTO: 1.6 K/UL (ref 1–4.8)
LYMPHOCYTES NFR BLD: 22.3 % (ref 18–48)
MCH RBC QN AUTO: 30.1 PG (ref 27–31)
MCHC RBC AUTO-ENTMCNC: 33.6 G/DL (ref 32–36)
MCV RBC AUTO: 90 FL (ref 82–98)
MONOCYTES # BLD AUTO: 0.5 K/UL (ref 0.3–1)
MONOCYTES NFR BLD: 6.8 % (ref 4–15)
NEUTROPHILS # BLD AUTO: 5 K/UL (ref 1.8–7.7)
NEUTROPHILS NFR BLD: 68.3 % (ref 38–73)
NRBC BLD-RTO: 0 /100 WBC
PLATELET # BLD AUTO: 227 K/UL (ref 150–450)
PMV BLD AUTO: 9.6 FL (ref 9.2–12.9)
POTASSIUM SERPL-SCNC: 4.8 MMOL/L (ref 3.5–5.1)
PROT SERPL-MCNC: 7.3 G/DL (ref 6–8.4)
RBC # BLD AUTO: 3.99 M/UL (ref 4–5.4)
SODIUM SERPL-SCNC: 139 MMOL/L (ref 136–145)
WBC # BLD AUTO: 7.37 K/UL (ref 3.9–12.7)

## 2023-10-23 PROCEDURE — 3008F PR BODY MASS INDEX (BMI) DOCUMENTED: ICD-10-PCS | Mod: CPTII,S$GLB,, | Performed by: INTERNAL MEDICINE

## 2023-10-23 PROCEDURE — 3052F PR MOST RECENT HEMOGLOBIN A1C LEVEL 8.0 - < 9.0%: ICD-10-PCS | Mod: CPTII,S$GLB,, | Performed by: INTERNAL MEDICINE

## 2023-10-23 PROCEDURE — 3066F PR DOCUMENTATION OF TREATMENT FOR NEPHROPATHY: ICD-10-PCS | Mod: CPTII,S$GLB,, | Performed by: INTERNAL MEDICINE

## 2023-10-23 PROCEDURE — 1160F RVW MEDS BY RX/DR IN RCRD: CPT | Mod: CPTII,S$GLB,, | Performed by: INTERNAL MEDICINE

## 2023-10-23 PROCEDURE — 3052F HG A1C>EQUAL 8.0%<EQUAL 9.0%: CPT | Mod: CPTII,S$GLB,, | Performed by: INTERNAL MEDICINE

## 2023-10-23 PROCEDURE — 3066F NEPHROPATHY DOC TX: CPT | Mod: CPTII,S$GLB,, | Performed by: INTERNAL MEDICINE

## 2023-10-23 PROCEDURE — 1159F MED LIST DOCD IN RCRD: CPT | Mod: CPTII,S$GLB,, | Performed by: INTERNAL MEDICINE

## 2023-10-23 PROCEDURE — 1159F PR MEDICATION LIST DOCUMENTED IN MEDICAL RECORD: ICD-10-PCS | Mod: CPTII,S$GLB,, | Performed by: INTERNAL MEDICINE

## 2023-10-23 PROCEDURE — 3078F PR MOST RECENT DIASTOLIC BLOOD PRESSURE < 80 MM HG: ICD-10-PCS | Mod: CPTII,S$GLB,, | Performed by: INTERNAL MEDICINE

## 2023-10-23 PROCEDURE — 86706 HEP B SURFACE ANTIBODY: CPT | Mod: 91 | Performed by: STUDENT IN AN ORGANIZED HEALTH CARE EDUCATION/TRAINING PROGRAM

## 2023-10-23 PROCEDURE — 90471 IMMUNIZATION ADMIN: CPT | Mod: S$GLB,,, | Performed by: INTERNAL MEDICINE

## 2023-10-23 PROCEDURE — 99214 OFFICE O/P EST MOD 30 MIN: CPT | Mod: 25,S$GLB,, | Performed by: INTERNAL MEDICINE

## 2023-10-23 PROCEDURE — 3060F POS MICROALBUMINURIA REV: CPT | Mod: CPTII,S$GLB,, | Performed by: INTERNAL MEDICINE

## 2023-10-23 PROCEDURE — 99999 PR PBB SHADOW E&M-EST. PATIENT-LVL IV: CPT | Mod: PBBFAC,,, | Performed by: INTERNAL MEDICINE

## 2023-10-23 PROCEDURE — 3074F PR MOST RECENT SYSTOLIC BLOOD PRESSURE < 130 MM HG: ICD-10-PCS | Mod: CPTII,S$GLB,, | Performed by: INTERNAL MEDICINE

## 2023-10-23 PROCEDURE — 90686 IIV4 VACC NO PRSV 0.5 ML IM: CPT | Mod: S$GLB,,, | Performed by: INTERNAL MEDICINE

## 2023-10-23 PROCEDURE — 4010F PR ACE/ARB THEARPY RXD/TAKEN: ICD-10-PCS | Mod: CPTII,S$GLB,, | Performed by: INTERNAL MEDICINE

## 2023-10-23 PROCEDURE — 3008F BODY MASS INDEX DOCD: CPT | Mod: CPTII,S$GLB,, | Performed by: INTERNAL MEDICINE

## 2023-10-23 PROCEDURE — 4010F ACE/ARB THERAPY RXD/TAKEN: CPT | Mod: CPTII,S$GLB,, | Performed by: INTERNAL MEDICINE

## 2023-10-23 PROCEDURE — 82607 VITAMIN B-12: CPT | Performed by: INTERNAL MEDICINE

## 2023-10-23 PROCEDURE — 83036 HEMOGLOBIN GLYCOSYLATED A1C: CPT | Performed by: INTERNAL MEDICINE

## 2023-10-23 PROCEDURE — 3074F SYST BP LT 130 MM HG: CPT | Mod: CPTII,S$GLB,, | Performed by: INTERNAL MEDICINE

## 2023-10-23 PROCEDURE — 80053 COMPREHEN METABOLIC PANEL: CPT | Performed by: INTERNAL MEDICINE

## 2023-10-23 PROCEDURE — 90686 FLU VACCINE (QUAD) GREATER THAN OR EQUAL TO 3YO PRESERVATIVE FREE IM: ICD-10-PCS | Mod: S$GLB,,, | Performed by: INTERNAL MEDICINE

## 2023-10-23 PROCEDURE — 99999 PR PBB SHADOW E&M-EST. PATIENT-LVL IV: ICD-10-PCS | Mod: PBBFAC,,, | Performed by: INTERNAL MEDICINE

## 2023-10-23 PROCEDURE — 36415 COLL VENOUS BLD VENIPUNCTURE: CPT | Performed by: STUDENT IN AN ORGANIZED HEALTH CARE EDUCATION/TRAINING PROGRAM

## 2023-10-23 PROCEDURE — 3078F DIAST BP <80 MM HG: CPT | Mod: CPTII,S$GLB,, | Performed by: INTERNAL MEDICINE

## 2023-10-23 PROCEDURE — 3060F PR POS MICROALBUMINURIA RESULT DOCUMENTED/REVIEW: ICD-10-PCS | Mod: CPTII,S$GLB,, | Performed by: INTERNAL MEDICINE

## 2023-10-23 PROCEDURE — 85025 COMPLETE CBC W/AUTO DIFF WBC: CPT | Performed by: INTERNAL MEDICINE

## 2023-10-23 PROCEDURE — 1160F PR REVIEW ALL MEDS BY PRESCRIBER/CLIN PHARMACIST DOCUMENTED: ICD-10-PCS | Mod: CPTII,S$GLB,, | Performed by: INTERNAL MEDICINE

## 2023-10-23 PROCEDURE — 90471 FLU VACCINE (QUAD) GREATER THAN OR EQUAL TO 3YO PRESERVATIVE FREE IM: ICD-10-PCS | Mod: S$GLB,,, | Performed by: INTERNAL MEDICINE

## 2023-10-23 PROCEDURE — 99214 PR OFFICE/OUTPT VISIT, EST, LEVL IV, 30-39 MIN: ICD-10-PCS | Mod: 25,S$GLB,, | Performed by: INTERNAL MEDICINE

## 2023-10-23 RX ORDER — LOSARTAN POTASSIUM 25 MG/1
12.5 TABLET ORAL DAILY
Qty: 45 TABLET | Refills: 3 | Status: SHIPPED | OUTPATIENT
Start: 2023-10-23 | End: 2024-10-22

## 2023-10-23 NOTE — PATIENT INSTRUCTIONS
Flu shot today  Labwork today  Covid-19 booster today - go to pharmacy    Stop lisinopril as this may be causing you to have a cough.  Start losartan 12.5 mg daily (half tablet of the 25)    Return to clinic in 3 months or sooner if needed.

## 2023-10-23 NOTE — PROGRESS NOTES
"Subjective:       Patient ID: Shelley Levy is a 62 y.o. female.    Chief Complaint: Diabetes      HPI  Shelley Levy is a 62 y.o. year old female with t2DM, psoriasis presents for follow up. Taking levemir 10 units nightly, metformin 500 mg twice a day. A1c has improved, lifestyle changes were made.     Patient is eating a lot of "sugar free" foods including candy, chocolates, snacks. States she has frequent loose stools.     Review of Systems   Constitutional:  Negative for activity change, appetite change, fatigue, fever and unexpected weight change.   HENT:  Negative for congestion, hearing loss, postnasal drip, sneezing, sore throat, trouble swallowing and voice change.    Eyes:  Negative for pain and discharge.   Respiratory:  Negative for cough, choking, chest tightness, shortness of breath and wheezing.    Cardiovascular:  Negative for chest pain, palpitations and leg swelling.   Gastrointestinal:  Positive for diarrhea. Negative for abdominal distention, abdominal pain, blood in stool, constipation, nausea and vomiting.        Bloating   Endocrine: Negative for polydipsia and polyuria.   Genitourinary:  Negative for difficulty urinating and flank pain.   Musculoskeletal:  Negative for arthralgias, back pain, joint swelling, myalgias and neck pain.   Skin:  Negative for rash.   Neurological:  Negative for dizziness, tremors, seizures, weakness, numbness and headaches.   Psychiatric/Behavioral:  Negative for agitation. The patient is not nervous/anxious.          Past Medical History:   Diagnosis Date    Type 2 diabetes mellitus with neurologic complication 9/5/2023        Prior to Admission medications    Medication Sig Start Date End Date Taking? Authorizing Provider   blood sugar diagnostic Strp To check BG 4 times daily, to use with insurance preferred meter 7/25/23  Yes Corey Hernandez MD   blood-glucose meter kit To check BG 4 times daily, to use with insurance preferred meter 7/25/23 7/24/24 Yes " "Corey Hernandez MD   clobetasoL (TEMOVATE) 0.05 % cream Apply topically 2 (two) times daily. Use two weeks then take a one week break. Repeat in 2 week intervals. 9/5/23  Yes Joyce Adamson MD   clobetasoL (TEMOVATE) 0.05 % external solution Apply topically 2 (two) times daily. Use two weeks then take a one week break. Repeat in 2 week intervals. 9/5/23  Yes Joyce Adamson MD   cyanocobalamin (VITAMIN B-12) 1000 MCG tablet Take 1 tablet (1,000 mcg total) by mouth once daily. 8/29/23  Yes Corey Hernandez MD   furosemide (LASIX) 20 MG tablet Take 1 tablet (20 mg total) by mouth once daily. for 20 days 10/5/23 10/25/23 Yes Corey Hernandez MD   gabapentin (NEURONTIN) 300 MG capsule Take 1 capsule (300 mg total) by mouth 3 (three) times daily. 8/28/23 8/27/24 Yes Corey Hernandez MD   insulin detemir U-100, Levemir, (LEVEMIR FLEXPEN) 100 unit/mL (3 mL) InPn pen Inject 10 Units into the skin every evening. 7/25/23 3/16/25 Yes Corey Hernandez MD   ketoconazole (NIZORAL) 2 % cream Apply topically 2 (two) times daily. To rash under breasts and on back 7/11/23  Yes Ruth Moody MD   ketoconazole (NIZORAL) 2 % shampoo Apply topically once daily. Use in shower on rash, then rinse. 7/11/23  Yes Ruth Moody MD   lancets [facility] To check BG 4 times daily, to use with insurance preferred meter 9/25/23  Yes Corey Hernandez MD   lisinopriL (PRINIVIL,ZESTRIL) 5 MG tablet Take 1 tablet (5 mg total) by mouth once daily. 7/27/23 7/26/24 Yes Corey Hernandez MD   pen needle, diabetic 32 gauge x 5/32" Ndle 1 Stick by Misc.(Non-Drug; Combo Route) route once daily. 7/25/23  Yes Corey Hernandez MD   pravastatin (PRAVACHOL) 20 MG tablet Take 1 tablet (20 mg total) by mouth once daily. 8/28/23 8/27/24 Yes Corey Hernandez MD   terbinafine HCL (LAMISIL) 250 mg tablet Take 1 tablet (250 mg total) by mouth once daily. 8/1/23 10/30/23 Yes Yossi Prado, VIN   TRUEPLUS LANCETS 33 gauge Misc Apply topically 4 (four) times daily. 7/25/23  Yes Provider, Historical " "  metFORMIN (GLUCOPHAGE-XR) 500 MG ER 24hr tablet Take 2 tablets (1,000 mg total) by mouth 2 (two) times daily with meals. 7/25/23 7/24/24  Corey Hernandez MD        Past medical history, surgical history, and family medical history reviewed and updated as appropriate.    Medications and allergies reviewed.     Objective:          Vitals:    10/23/23 1325   BP: 112/62   BP Location: Right arm   Patient Position: Sitting   Pulse: 82   SpO2: 97%   Weight: 73.2 kg (161 lb 6 oz)   Height: 4' 9.5" (1.461 m)     Body mass index is 34.32 kg/m².  Physical Exam  Constitutional:       Appearance: She is well-developed.   HENT:      Head: Normocephalic and atraumatic.   Eyes:      Extraocular Movements: Extraocular movements intact.   Cardiovascular:      Rate and Rhythm: Normal rate and regular rhythm.      Heart sounds: Normal heart sounds.   Pulmonary:      Effort: Pulmonary effort is normal. No respiratory distress.      Breath sounds: Normal breath sounds. No wheezing.   Abdominal:      General: Bowel sounds are normal. There is no distension.      Palpations: Abdomen is soft.      Tenderness: There is no abdominal tenderness.   Musculoskeletal:         General: No tenderness. Normal range of motion.      Cervical back: Normal range of motion.   Skin:     General: Skin is warm and dry.      Findings: Rash (improving) present.   Neurological:      Mental Status: She is alert and oriented to person, place, and time.      Cranial Nerves: No cranial nerve deficit.      Deep Tendon Reflexes: Reflexes are normal and symmetric.         Lab Results   Component Value Date    WBC 9.53 07/12/2023    HGB 14.1 07/12/2023    HCT 43.5 07/12/2023     07/12/2023    CHOL 191 07/12/2023    TRIG 225 (H) 07/12/2023    HDL 32 (L) 07/12/2023    ALT 35 07/12/2023    AST 23 07/12/2023     (L) 07/12/2023    K 4.5 07/12/2023    CL 97 07/12/2023    CREATININE 0.8 07/12/2023    BUN 10 07/12/2023    CO2 27 07/12/2023    HGBA1C 8.7 (H) " 08/28/2023       Assessment:       1. Follow-up exam    2. Type 2 diabetes mellitus with hyperglycemia, without long-term current use of insulin    3. Vitamin B12 deficiency    4. Polyneuropathy          Plan:     Shelley was seen today for diabetes.    Diagnoses and all orders for this visit:    Follow-up exam    Type 2 diabetes mellitus with hyperglycemia, without long-term current use of insulin  -     HEMOGLOBIN A1C; Future  -     losartan (COZAAR) 25 MG tablet; Take 0.5 tablets (12.5 mg total) by mouth once daily.    Vitamin B12 deficiency  -     VITAMIN B12; Future    Polyneuropathy  -     COMPREHENSIVE METABOLIC PANEL; Future  -     CBC W/ AUTO DIFFERENTIAL; Future    Flu shot today  Labwork today  Covid-19 booster today - go to pharmacy    Stop lisinopril - cough  Start losartan 12.5 mg daily     Health maintenance reviewed with patient.     Follow up in about 3 months (around 1/23/2024).    Corey Hernandez MD  Internal Medicine / Primary Care  [facility]  10/23/2023

## 2023-10-23 NOTE — PROGRESS NOTES
After obtaining consent, and per orders of Dr. Corey Hernandez, injection of FluArix given by Tati Escalona on the left deltoid. Patient instructed to remain in clinic for 15 minutes afterwards, and to report any adverse reaction to me immediately.

## 2023-10-24 LAB
HBV SURFACE AB SER-ACNC: <3 MIU/ML
HBV SURFACE AB SER-ACNC: NORMAL M[IU]/ML
VIT B12 SERPL-MCNC: >2000 PG/ML (ref 210–950)

## 2023-10-26 ENCOUNTER — OFFICE VISIT (OUTPATIENT)
Dept: OPTOMETRY | Facility: CLINIC | Age: 63
End: 2023-10-26
Payer: COMMERCIAL

## 2023-10-26 DIAGNOSIS — H25.13 NUCLEAR SCLEROSIS OF BOTH EYES: ICD-10-CM

## 2023-10-26 DIAGNOSIS — H53.002 AMBLYOPIA EX ANOPSIA OF LEFT EYE: ICD-10-CM

## 2023-10-26 DIAGNOSIS — H52.7 REFRACTIVE ERRORS: ICD-10-CM

## 2023-10-26 DIAGNOSIS — H26.9 CORTICAL CATARACT OF BOTH EYES: ICD-10-CM

## 2023-10-26 DIAGNOSIS — H53.8 BLURRED VISION, BILATERAL: Primary | ICD-10-CM

## 2023-10-26 PROCEDURE — 99499 UNLISTED E&M SERVICE: CPT | Mod: S$GLB,,, | Performed by: OPTOMETRIST

## 2023-10-26 PROCEDURE — 99999 PR PBB SHADOW E&M-EST. PATIENT-LVL III: ICD-10-PCS | Mod: PBBFAC,,, | Performed by: OPTOMETRIST

## 2023-10-26 PROCEDURE — 99499 NO LOS: ICD-10-PCS | Mod: S$GLB,,, | Performed by: OPTOMETRIST

## 2023-10-26 PROCEDURE — 99999 PR PBB SHADOW E&M-EST. PATIENT-LVL III: CPT | Mod: PBBFAC,,, | Performed by: OPTOMETRIST

## 2023-10-27 NOTE — PATIENT INSTRUCTIONS
Type 2 diabetes, with previously diagnosed bilateral background diabetes.    Previously diagnosed nuclear sclerosis of lens and bilateral cortical cataract.    History of amblyopia in the left eye    Ms. Levy reports difficulty with [facility] in the right eye with the last spectacle lens Rx issued.  She was in for recheck of refraction last week, but blood glucose was found to be 216 mg/dL. She was advised to return after she was able to maintain blood glucose level within the normal range.    Ms. Levy in for recheck of refraction today.  Blood glucose level measured upon arrival to clinic (99 mg/dL).  Refraction repeated.    New spectacle lens Rx issued with change in power of the right lens.  Advised to return to optical vendor for remake of the right lens only.  Call/return if problems persist with the new spectacle lens Rx.     Otherwise, recheck in approximately one year.

## 2023-10-28 NOTE — PROGRESS NOTES
HPI    Patient in for progress check.    Being followed for (diagnosis):   Recheck sight and Glucose     Date last seen:  10/19/2023    Doctor last seen:  Dr. Slade    Prescribed eye medications(s) using:  TheraTears and Flax seed oil with   omega 3    OTC eye medication(s) using:  Refresh    Signs/symptoms of condition resolved/better/stable/worse?:  Still having a   foggy vision seeing shadows when looking in distance.              Last edited by Trista Ontiveros on 10/26/2023  4:10 PM.            Assessment /Plan     For exam results, see Encounter Report.    1. Blurred vision, bilateral        2. Nuclear sclerosis of both eyes        3. Cortical cataract of both eyes        4. Amblyopia ex anopsia of left eye        5. Refractive errors                     Type 2 diabetes, with previously diagnosed bilateral background diabetes.    Previously diagnosed nuclear sclerosis of lens and bilateral cortical cataract.    History of amblyopia in the left eye    Ms. Levy reports difficulty with [facility] in the right eye with the last spectacle lens Rx issued.  She was in for recheck of refraction last week, but blood glucose was found to be 216 mg/dL. She was advised to return after she was able to maintain blood glucose level within the normal range.    Ms. Levy in for recheck of refraction today.  Blood glucose level measured upon arrival to clinic (99 mg/dL).  Refraction repeated.    New spectacle lens Rx issued with change in power of the right lens.  Advised to return to optical vendor for remake of the right lens only.  Call/return if problems persist with the new spectacle lens Rx.     Otherwise, recheck in approximately one year.

## 2023-11-01 ENCOUNTER — PATIENT MESSAGE (OUTPATIENT)
Dept: INTERNAL MEDICINE | Facility: CLINIC | Age: 63
End: 2023-11-01
Payer: COMMERCIAL

## 2023-11-01 ENCOUNTER — OFFICE VISIT (OUTPATIENT)
Dept: DERMATOLOGY | Facility: CLINIC | Age: 63
End: 2023-11-01
Payer: COMMERCIAL

## 2023-11-01 DIAGNOSIS — L40.9 PSORIASIS: Primary | ICD-10-CM

## 2023-11-01 PROCEDURE — 99214 PR OFFICE/OUTPT VISIT, EST, LEVL IV, 30-39 MIN: ICD-10-PCS | Mod: S$GLB,,, | Performed by: DERMATOLOGY

## 2023-11-01 PROCEDURE — 99999 PR PBB SHADOW E&M-EST. PATIENT-LVL III: CPT | Mod: PBBFAC,,, | Performed by: DERMATOLOGY

## 2023-11-01 PROCEDURE — 3060F POS MICROALBUMINURIA REV: CPT | Mod: CPTII,S$GLB,, | Performed by: DERMATOLOGY

## 2023-11-01 PROCEDURE — 99214 OFFICE O/P EST MOD 30 MIN: CPT | Mod: S$GLB,,, | Performed by: DERMATOLOGY

## 2023-11-01 PROCEDURE — 3044F HG A1C LEVEL LT 7.0%: CPT | Mod: CPTII,S$GLB,, | Performed by: DERMATOLOGY

## 2023-11-01 PROCEDURE — 4010F PR ACE/ARB THEARPY RXD/TAKEN: ICD-10-PCS | Mod: CPTII,S$GLB,, | Performed by: DERMATOLOGY

## 2023-11-01 PROCEDURE — 3066F PR DOCUMENTATION OF TREATMENT FOR NEPHROPATHY: ICD-10-PCS | Mod: CPTII,S$GLB,, | Performed by: DERMATOLOGY

## 2023-11-01 PROCEDURE — 1159F PR MEDICATION LIST DOCUMENTED IN MEDICAL RECORD: ICD-10-PCS | Mod: CPTII,S$GLB,, | Performed by: DERMATOLOGY

## 2023-11-01 PROCEDURE — 1160F RVW MEDS BY RX/DR IN RCRD: CPT | Mod: CPTII,S$GLB,, | Performed by: DERMATOLOGY

## 2023-11-01 PROCEDURE — 3066F NEPHROPATHY DOC TX: CPT | Mod: CPTII,S$GLB,, | Performed by: DERMATOLOGY

## 2023-11-01 PROCEDURE — 99999 PR PBB SHADOW E&M-EST. PATIENT-LVL III: ICD-10-PCS | Mod: PBBFAC,,, | Performed by: DERMATOLOGY

## 2023-11-01 PROCEDURE — 1159F MED LIST DOCD IN RCRD: CPT | Mod: CPTII,S$GLB,, | Performed by: DERMATOLOGY

## 2023-11-01 PROCEDURE — 4010F ACE/ARB THERAPY RXD/TAKEN: CPT | Mod: CPTII,S$GLB,, | Performed by: DERMATOLOGY

## 2023-11-01 PROCEDURE — 3060F PR POS MICROALBUMINURIA RESULT DOCUMENTED/REVIEW: ICD-10-PCS | Mod: CPTII,S$GLB,, | Performed by: DERMATOLOGY

## 2023-11-01 PROCEDURE — 3044F PR MOST RECENT HEMOGLOBIN A1C LEVEL <7.0%: ICD-10-PCS | Mod: CPTII,S$GLB,, | Performed by: DERMATOLOGY

## 2023-11-01 PROCEDURE — 1160F PR REVIEW ALL MEDS BY PRESCRIBER/CLIN PHARMACIST DOCUMENTED: ICD-10-PCS | Mod: CPTII,S$GLB,, | Performed by: DERMATOLOGY

## 2023-11-01 RX ORDER — CALCIPOTRIENE 50 UG/G
CREAM TOPICAL 2 TIMES DAILY
Qty: 120 G | Refills: 3 | Status: SHIPPED | OUTPATIENT
Start: 2023-11-01 | End: 2024-10-31

## 2023-11-01 RX ORDER — APREMILAST 30 MG/1
TABLET, FILM COATED ORAL
Qty: 60 TABLET | Refills: 2 | Status: ACTIVE | OUTPATIENT
Start: 2023-11-01

## 2023-11-01 NOTE — PROGRESS NOTES
Patient received sample medication per verbal order by Dr. Moody. Medication Otezla dose 30mg.  Lot [id number], expiration 08/31/2024.

## 2023-11-01 NOTE — PROGRESS NOTES
Subjective:      Patient ID:  Shelley Levy is a 62 y.o. female who presents for   Chief Complaint   Patient presents with    Follow-up     Psoriasis     Psoriasis - Follow-up  Symptom course: improving  Currently using: clobetasol cream on the body, clobetasol solution to scalp.  Affected locations: left lower leg and right lower leg  Signs / symptoms: itching, scaling and redness      Much improved but with persistent activity in large plaques on both thighs and on rest of body.    Review of Systems   Constitutional:  Negative for fever and chills.   Musculoskeletal:  Positive for arthralgias. Negative for joint swelling.   Skin:  Positive for itching and rash.       Objective:   Physical Exam   Constitutional: She appears well-developed and well-nourished. No distress.   Neurological: She is alert and oriented to person, place, and time. She is not disoriented.   Psychiatric: She has a normal mood and affect.   Skin:   Areas Examined (abnormalities noted in diagram):   Scalp / Hair Palpated and Inspected  Head / Face Inspection Performed  Neck Inspection Performed  Chest / Axilla Inspection Performed  Abdomen Inspection Performed  Genitals / Buttocks / Groin Inspection Performed  Back Inspection Performed  RUE Inspected  LUE Inspection Performed  RLE Inspected  LLE Inspection Performed  Nails and Digits Inspection Performed            Diagram Legend     Erythematous scaling macule/papule c/w actinic keratosis       Vascular papule c/w angioma      Pigmented verrucoid papule/plaque c/w seborrheic keratosis      Yellow umbilicated papule c/w sebaceous hyperplasia      Irregularly shaped tan macule c/w lentigo     1-2 mm smooth white papules consistent with Milia      Movable subcutaneous cyst with punctum c/w epidermal inclusion cyst      Subcutaneous movable cyst c/w pilar cyst      Firm pink to brown papule c/w dermatofibroma      Pedunculated fleshy papule(s) c/w skin tag(s)      Evenly pigmented macule  c/w junctional nevus     Mildly variegated pigmented, slightly irregular-bordered macule c/w mildly atypical nevus      Flesh colored to evenly pigmented papule c/w intradermal nevus       Pink pearly papule/plaque c/w basal cell carcinoma      Erythematous hyperkeratotic cursted plaque c/w SCC      Surgical scar with no sign of skin cancer recurrence      Open and closed comedones      Inflammatory papules and pustules      Verrucoid papule consistent consistent with wart     Erythematous eczematous patches and plaques     Dystrophic onycholytic nail with subungual debris c/w onychomycosis     Umbilicated papule    Erythematous-base heme-crusted tan verrucoid plaque consistent with inflamed seborrheic keratosis     Erythematous Silvery Scaling Plaque c/w Psoriasis     See annotation      Assessment / Plan:        Psoriasis  -     OTEZLA 30 mg Tab; Take 1 po bid (Patient not taking: Reported on 11/2/2023)  Dispense: 60 tablet; Refill: 2    Other orders  -     calcipotriene (DOVONOX) 0.005 % cream; Apply topically 2 (two) times daily. To psoriasis (Patient not taking: Reported on 11/2/2023)  Dispense: 120 g; Refill: 3         Partially cleared with topicals but with persistent activity  Reviewed systemic agents  Labs reviewed    Discussed the benefits and risks of Otezla including but not limited to nausea, vomiting, diarrhea, weight loss, and depression.    Start Otezla at 30mg bid after standard tapering up to this dose.    Otezla is a pregnancy category C and dose needs to be reduced in patients with severe renal impairment (Cr cl < 30ml/min)    Otezla is not recommended with strong ZAB282 enzyme inducers ie rifamin and anti-epileptics)      No follow-ups on file.  3 mo

## 2023-11-02 ENCOUNTER — OFFICE VISIT (OUTPATIENT)
Dept: INTERNAL MEDICINE | Facility: CLINIC | Age: 63
End: 2023-11-02
Payer: COMMERCIAL

## 2023-11-02 ENCOUNTER — TELEPHONE (OUTPATIENT)
Dept: PODIATRY | Facility: CLINIC | Age: 63
End: 2023-11-02
Payer: COMMERCIAL

## 2023-11-02 VITALS
SYSTOLIC BLOOD PRESSURE: 122 MMHG | OXYGEN SATURATION: 98 % | HEIGHT: 58 IN | WEIGHT: 158.75 LBS | RESPIRATION RATE: 18 BRPM | BODY MASS INDEX: 33.32 KG/M2 | DIASTOLIC BLOOD PRESSURE: 68 MMHG | HEART RATE: 74 BPM

## 2023-11-02 DIAGNOSIS — R09.81 SINUS CONGESTION: ICD-10-CM

## 2023-11-02 DIAGNOSIS — J01.90 ACUTE VIRAL SINUSITIS: Primary | ICD-10-CM

## 2023-11-02 DIAGNOSIS — B97.89 ACUTE VIRAL SINUSITIS: Primary | ICD-10-CM

## 2023-11-02 LAB
CTP QC/QA: YES
CTP QC/QA: YES
POC MOLECULAR INFLUENZA A AGN: NEGATIVE
POC MOLECULAR INFLUENZA B AGN: NEGATIVE
SARS-COV-2 RDRP RESP QL NAA+PROBE: NEGATIVE

## 2023-11-02 PROCEDURE — 3044F HG A1C LEVEL LT 7.0%: CPT | Mod: CPTII,S$GLB,, | Performed by: STUDENT IN AN ORGANIZED HEALTH CARE EDUCATION/TRAINING PROGRAM

## 2023-11-02 PROCEDURE — 3008F PR BODY MASS INDEX (BMI) DOCUMENTED: ICD-10-PCS | Mod: CPTII,S$GLB,, | Performed by: STUDENT IN AN ORGANIZED HEALTH CARE EDUCATION/TRAINING PROGRAM

## 2023-11-02 PROCEDURE — 3066F PR DOCUMENTATION OF TREATMENT FOR NEPHROPATHY: ICD-10-PCS | Mod: CPTII,S$GLB,, | Performed by: STUDENT IN AN ORGANIZED HEALTH CARE EDUCATION/TRAINING PROGRAM

## 2023-11-02 PROCEDURE — 3060F POS MICROALBUMINURIA REV: CPT | Mod: CPTII,S$GLB,, | Performed by: STUDENT IN AN ORGANIZED HEALTH CARE EDUCATION/TRAINING PROGRAM

## 2023-11-02 PROCEDURE — 99999 PR PBB SHADOW E&M-EST. PATIENT-LVL V: CPT | Mod: PBBFAC,,, | Performed by: STUDENT IN AN ORGANIZED HEALTH CARE EDUCATION/TRAINING PROGRAM

## 2023-11-02 PROCEDURE — 3060F PR POS MICROALBUMINURIA RESULT DOCUMENTED/REVIEW: ICD-10-PCS | Mod: CPTII,S$GLB,, | Performed by: STUDENT IN AN ORGANIZED HEALTH CARE EDUCATION/TRAINING PROGRAM

## 2023-11-02 PROCEDURE — 99213 OFFICE O/P EST LOW 20 MIN: CPT | Mod: S$GLB,,, | Performed by: STUDENT IN AN ORGANIZED HEALTH CARE EDUCATION/TRAINING PROGRAM

## 2023-11-02 PROCEDURE — 3078F PR MOST RECENT DIASTOLIC BLOOD PRESSURE < 80 MM HG: ICD-10-PCS | Mod: CPTII,S$GLB,, | Performed by: STUDENT IN AN ORGANIZED HEALTH CARE EDUCATION/TRAINING PROGRAM

## 2023-11-02 PROCEDURE — 4010F PR ACE/ARB THEARPY RXD/TAKEN: ICD-10-PCS | Mod: CPTII,S$GLB,, | Performed by: STUDENT IN AN ORGANIZED HEALTH CARE EDUCATION/TRAINING PROGRAM

## 2023-11-02 PROCEDURE — 3078F DIAST BP <80 MM HG: CPT | Mod: CPTII,S$GLB,, | Performed by: STUDENT IN AN ORGANIZED HEALTH CARE EDUCATION/TRAINING PROGRAM

## 2023-11-02 PROCEDURE — 99999 PR PBB SHADOW E&M-EST. PATIENT-LVL V: ICD-10-PCS | Mod: PBBFAC,,, | Performed by: STUDENT IN AN ORGANIZED HEALTH CARE EDUCATION/TRAINING PROGRAM

## 2023-11-02 PROCEDURE — 87635: ICD-10-PCS | Mod: QW,S$GLB,, | Performed by: STUDENT IN AN ORGANIZED HEALTH CARE EDUCATION/TRAINING PROGRAM

## 2023-11-02 PROCEDURE — 1159F MED LIST DOCD IN RCRD: CPT | Mod: CPTII,S$GLB,, | Performed by: STUDENT IN AN ORGANIZED HEALTH CARE EDUCATION/TRAINING PROGRAM

## 2023-11-02 PROCEDURE — 87502 POCT INFLUENZA A/B MOLECULAR: ICD-10-PCS | Mod: QW,S$GLB,, | Performed by: STUDENT IN AN ORGANIZED HEALTH CARE EDUCATION/TRAINING PROGRAM

## 2023-11-02 PROCEDURE — 3044F PR MOST RECENT HEMOGLOBIN A1C LEVEL <7.0%: ICD-10-PCS | Mod: CPTII,S$GLB,, | Performed by: STUDENT IN AN ORGANIZED HEALTH CARE EDUCATION/TRAINING PROGRAM

## 2023-11-02 PROCEDURE — 3074F PR MOST RECENT SYSTOLIC BLOOD PRESSURE < 130 MM HG: ICD-10-PCS | Mod: CPTII,S$GLB,, | Performed by: STUDENT IN AN ORGANIZED HEALTH CARE EDUCATION/TRAINING PROGRAM

## 2023-11-02 PROCEDURE — 4010F ACE/ARB THERAPY RXD/TAKEN: CPT | Mod: CPTII,S$GLB,, | Performed by: STUDENT IN AN ORGANIZED HEALTH CARE EDUCATION/TRAINING PROGRAM

## 2023-11-02 PROCEDURE — 87502 INFLUENZA DNA AMP PROBE: CPT | Mod: QW,S$GLB,, | Performed by: STUDENT IN AN ORGANIZED HEALTH CARE EDUCATION/TRAINING PROGRAM

## 2023-11-02 PROCEDURE — 3074F SYST BP LT 130 MM HG: CPT | Mod: CPTII,S$GLB,, | Performed by: STUDENT IN AN ORGANIZED HEALTH CARE EDUCATION/TRAINING PROGRAM

## 2023-11-02 PROCEDURE — 99213 PR OFFICE/OUTPT VISIT, EST, LEVL III, 20-29 MIN: ICD-10-PCS | Mod: S$GLB,,, | Performed by: STUDENT IN AN ORGANIZED HEALTH CARE EDUCATION/TRAINING PROGRAM

## 2023-11-02 PROCEDURE — 1160F PR REVIEW ALL MEDS BY PRESCRIBER/CLIN PHARMACIST DOCUMENTED: ICD-10-PCS | Mod: CPTII,S$GLB,, | Performed by: STUDENT IN AN ORGANIZED HEALTH CARE EDUCATION/TRAINING PROGRAM

## 2023-11-02 PROCEDURE — 3066F NEPHROPATHY DOC TX: CPT | Mod: CPTII,S$GLB,, | Performed by: STUDENT IN AN ORGANIZED HEALTH CARE EDUCATION/TRAINING PROGRAM

## 2023-11-02 PROCEDURE — 3008F BODY MASS INDEX DOCD: CPT | Mod: CPTII,S$GLB,, | Performed by: STUDENT IN AN ORGANIZED HEALTH CARE EDUCATION/TRAINING PROGRAM

## 2023-11-02 PROCEDURE — 1160F RVW MEDS BY RX/DR IN RCRD: CPT | Mod: CPTII,S$GLB,, | Performed by: STUDENT IN AN ORGANIZED HEALTH CARE EDUCATION/TRAINING PROGRAM

## 2023-11-02 PROCEDURE — 1159F PR MEDICATION LIST DOCUMENTED IN MEDICAL RECORD: ICD-10-PCS | Mod: CPTII,S$GLB,, | Performed by: STUDENT IN AN ORGANIZED HEALTH CARE EDUCATION/TRAINING PROGRAM

## 2023-11-02 PROCEDURE — 87635 SARS-COV-2 COVID-19 AMP PRB: CPT | Mod: QW,S$GLB,, | Performed by: STUDENT IN AN ORGANIZED HEALTH CARE EDUCATION/TRAINING PROGRAM

## 2023-11-02 RX ORDER — IPRATROPIUM BROMIDE 21 UG/1
2 SPRAY, METERED NASAL 3 TIMES DAILY
Qty: 30 ML | Refills: 0 | Status: SHIPPED | OUTPATIENT
Start: 2023-11-02

## 2023-11-02 NOTE — PROGRESS NOTES
"[facility] VISIT    CHIEF COMPLAINT:   Chief Complaint   Patient presents with    Sinus Problem       HISTORY OF PRESENT ILLNESS: Shelley Levy is a 62 y.o. female who presents here today for evaluation of sinusitis. She reports sinus pain, pressure, congestion, rhinorrhea. Symptoms have been present for 5-6 days. Symptoms have been stable in this timeframe. Patient denies sneezing, sore throat, headache, coughing, difficulty breathing, chest pain, fever, chills, nausea, vomiting. No sick contacts.  Patient has tried sudafed with minimal relief. Patient has a history of bronchitis and "borderline pneumonia." She provided a nebulizer and antibiotics during these occasions. She has never had to be hospitalized for pneumonia or bronchitis. No history of lung disease. Patient is up to date on COVID and influenza vaccinations - both of which she got just one week ago.      PHYSICAL EXAM:    /68 (BP Location: Right arm)   Pulse 74   Resp 18   Ht 4' 9.5" (1.461 m)   Wt 72 kg (158 lb 11.7 oz)   SpO2 98%   BMI 33.75 kg/m²     Physical Exam  Vitals and nursing note reviewed.   Constitutional:       General: She is not in acute distress.     Appearance: Normal appearance. She is not ill-appearing, toxic-appearing or diaphoretic.   HENT:      Head: Normocephalic and atraumatic.      Right Ear: Tympanic membrane, ear canal and external ear normal.      Left Ear: Tympanic membrane, ear canal and external ear normal.      Nose: Congestion present.      Mouth/Throat:      Mouth: Mucous membranes are moist.      Pharynx: Oropharynx is clear. Posterior oropharyngeal erythema present. No oropharyngeal exudate.   Eyes:      Extraocular Movements: Extraocular movements intact.      Conjunctiva/sclera: Conjunctivae normal.      Pupils: Pupils are equal, round, and reactive to light.   Cardiovascular:      Rate and Rhythm: Normal rate and regular rhythm.      Heart sounds: Normal heart sounds. No murmur " heard.  Pulmonary:      Effort: Pulmonary effort is normal. No respiratory distress.      Breath sounds: Normal breath sounds. No stridor. No wheezing, rhonchi or rales.   Musculoskeletal:         General: No deformity. Normal range of motion.      Cervical back: Neck supple. No tenderness.   Lymphadenopathy:      Cervical: No cervical adenopathy.   Skin:     Findings: No lesion or rash.   Neurological:      General: No focal deficit present.      Mental Status: She is alert.      Motor: No weakness.   Psychiatric:         Mood and Affect: Mood normal.         Behavior: Behavior normal.         Thought Content: Thought content normal.         Judgment: Judgment normal.           LAB REVIEW:    Results for orders placed or performed in visit on 11/02/23   POCT COVID-19 Rapid Screening   Result Value Ref Range    POC Rapid COVID Negative Negative     Acceptable Yes    POCT Influenza A/B Molecular   Result Value Ref Range    POC Molecular Influenza A Ag Negative Negative, Not Reported    POC Molecular Influenza B Ag Negative Negative, Not Reported     Acceptable Yes          ASSESSMENT & PLAN:    Shelley was seen today for sinus problem.    Diagnoses and all orders for this visit:    Acute viral sinusitis  Sinus congestion  Patient presents with 5 days of sinus pain, congestion, rhinorrhea.   On exam, vital signs are stable and patient is afebrile. Patient is slightly ill appearing but in no acute distress. Congestion noted. Normal respiratory rate and effort. Lungs are clear to ausculation without wheezing, rales, or rhonchi. Minimal posterior oropharyngeal erythema but no exudates or edema. No cervical lymphadenopathy.   POCT influenza and COVID testing is negative in office.  Likely self-limiting viral infection. Discussed no indication for antibiotics at this time.    Prescribed oral antihistamine-decongestant and intranasal medicine.  Recommended sinus saline rinses and tylenol/NSAID  for fever/pain.   Encouraged adequate rest and hydration.  Return/ER precautions discussed.  -     POCT COVID-19 Rapid Screening  -     POCT Influenza A/B Molecular  -     ipratropium (ATROVENT) 21 mcg (0.03 %) nasal spray; 2 sprays by Each Nostril route 3 (three) times daily.  -     loratadine-pseudoephedrine  mg (CLARITIN-D 24-HOUR)  mg per 24 hr tablet; Take 1 tablet by mouth once daily. for 10 days        I spent a total of 20 minutes on the day of the visit.  This includes face to face time and non-face to face time preparing to see the patient (eg, review of tests), obtaining and/or reviewing separately obtained history, documenting clinical information in the electronic or other health record, independently interpreting results and communicating results to the patient/family/caregiver, or care coordinator.          Almita Bravo MD  [facility]

## 2023-11-02 NOTE — TELEPHONE ENCOUNTER
Spoke to pt and rescheduled appt due to provider being out of clinic. Original appt:11/30/2023. New Appt:12/26/2023. Pt verbalized understanding.

## 2023-11-02 NOTE — PATIENT INSTRUCTIONS
I recommend the following:  Sinus saline rinse (such as neti pot)  Intranasal medicine such as flonase, nasocort, nasalcrom, or ipratropium. I have prescribed ipratropium - you can get a coupon for this medicine at [employer]. The other medicines are available over the counter.  Combined oral antihistamine-decongestant, such as claritin D, which has been prescribed.  Tylenol or NSAID (such as ibuprofen) as needed for fever, body aches, headaches.  Wash hands frequently.  Ensure you get plenty rest!     If your symptoms do not improve after trying the above interventions for at least 5 days, please let me know.

## 2023-11-20 ENCOUNTER — PATIENT MESSAGE (OUTPATIENT)
Dept: INTERNAL MEDICINE | Facility: CLINIC | Age: 63
End: 2023-11-20
Payer: COMMERCIAL

## 2023-11-22 DIAGNOSIS — R60.0 BILATERAL LOWER EXTREMITY EDEMA: ICD-10-CM

## 2023-11-22 DIAGNOSIS — B97.89 ACUTE VIRAL SINUSITIS: ICD-10-CM

## 2023-11-22 DIAGNOSIS — J01.90 ACUTE VIRAL SINUSITIS: ICD-10-CM

## 2023-11-22 RX ORDER — IPRATROPIUM BROMIDE 21 UG/1
SPRAY, METERED NASAL
OUTPATIENT
Start: 2023-11-22

## 2023-11-22 NOTE — TELEPHONE ENCOUNTER
Refill Routing Note   Medication(s) are not appropriate for processing by [facility] for the following reason(s):        New or recently adjusted medication    [facility] action(s):  Defer               Appointments  past 12m or future 3m with PCP    Date Provider   Last Visit   10/23/2023 Corey Hernandez MD   Next Visit   11/24/2023 Corey Hernandez MD   ED visits in past 90 days: 0        Note composed:4:45 PM 11/22/2023

## 2023-11-22 NOTE — TELEPHONE ENCOUNTER
No care due was identified.  [facility] Embedded Care Due Messages. Reference number: [id number].   11/22/2023 3:39:18 PM CST

## 2023-11-23 RX ORDER — TERBINAFINE HYDROCHLORIDE 250 MG/1
250 TABLET ORAL
Qty: 90 TABLET | Refills: 0 | Status: SHIPPED | OUTPATIENT
Start: 2023-11-23

## 2023-11-24 ENCOUNTER — OFFICE VISIT (OUTPATIENT)
Dept: INTERNAL MEDICINE | Facility: CLINIC | Age: 63
End: 2023-11-24
Payer: COMMERCIAL

## 2023-11-24 VITALS
BODY MASS INDEX: 33.6 KG/M2 | HEIGHT: 58 IN | DIASTOLIC BLOOD PRESSURE: 76 MMHG | SYSTOLIC BLOOD PRESSURE: 136 MMHG | HEART RATE: 71 BPM | WEIGHT: 160.06 LBS | OXYGEN SATURATION: 98 %

## 2023-11-24 DIAGNOSIS — J18.9 ATYPICAL PNEUMONIA: ICD-10-CM

## 2023-11-24 DIAGNOSIS — J20.9 ACUTE BRONCHITIS, UNSPECIFIED ORGANISM: Primary | ICD-10-CM

## 2023-11-24 PROCEDURE — 3060F POS MICROALBUMINURIA REV: CPT | Mod: CPTII,S$GLB,, | Performed by: INTERNAL MEDICINE

## 2023-11-24 PROCEDURE — 99214 PR OFFICE/OUTPT VISIT, EST, LEVL IV, 30-39 MIN: ICD-10-PCS | Mod: S$GLB,,, | Performed by: INTERNAL MEDICINE

## 2023-11-24 PROCEDURE — 3078F PR MOST RECENT DIASTOLIC BLOOD PRESSURE < 80 MM HG: ICD-10-PCS | Mod: CPTII,S$GLB,, | Performed by: INTERNAL MEDICINE

## 2023-11-24 PROCEDURE — 3008F PR BODY MASS INDEX (BMI) DOCUMENTED: ICD-10-PCS | Mod: CPTII,S$GLB,, | Performed by: INTERNAL MEDICINE

## 2023-11-24 PROCEDURE — 87635 SARS-COV-2 COVID-19 AMP PRB: CPT | Mod: QW,S$GLB,, | Performed by: INTERNAL MEDICINE

## 2023-11-24 PROCEDURE — 99214 OFFICE O/P EST MOD 30 MIN: CPT | Mod: S$GLB,,, | Performed by: INTERNAL MEDICINE

## 2023-11-24 PROCEDURE — 87502 INFLUENZA DNA AMP PROBE: CPT | Mod: QW,S$GLB,, | Performed by: INTERNAL MEDICINE

## 2023-11-24 PROCEDURE — 1159F PR MEDICATION LIST DOCUMENTED IN MEDICAL RECORD: ICD-10-PCS | Mod: CPTII,S$GLB,, | Performed by: INTERNAL MEDICINE

## 2023-11-24 PROCEDURE — 87635: ICD-10-PCS | Mod: QW,S$GLB,, | Performed by: INTERNAL MEDICINE

## 2023-11-24 PROCEDURE — 3066F NEPHROPATHY DOC TX: CPT | Mod: CPTII,S$GLB,, | Performed by: INTERNAL MEDICINE

## 2023-11-24 PROCEDURE — 3078F DIAST BP <80 MM HG: CPT | Mod: CPTII,S$GLB,, | Performed by: INTERNAL MEDICINE

## 2023-11-24 PROCEDURE — 99999 PR PBB SHADOW E&M-EST. PATIENT-LVL IV: CPT | Mod: PBBFAC,,, | Performed by: INTERNAL MEDICINE

## 2023-11-24 PROCEDURE — 4010F ACE/ARB THERAPY RXD/TAKEN: CPT | Mod: CPTII,S$GLB,, | Performed by: INTERNAL MEDICINE

## 2023-11-24 PROCEDURE — 3066F PR DOCUMENTATION OF TREATMENT FOR NEPHROPATHY: ICD-10-PCS | Mod: CPTII,S$GLB,, | Performed by: INTERNAL MEDICINE

## 2023-11-24 PROCEDURE — 99999 PR PBB SHADOW E&M-EST. PATIENT-LVL IV: ICD-10-PCS | Mod: PBBFAC,,, | Performed by: INTERNAL MEDICINE

## 2023-11-24 PROCEDURE — 3075F PR MOST RECENT SYSTOLIC BLOOD PRESS GE 130-139MM HG: ICD-10-PCS | Mod: CPTII,S$GLB,, | Performed by: INTERNAL MEDICINE

## 2023-11-24 PROCEDURE — 3008F BODY MASS INDEX DOCD: CPT | Mod: CPTII,S$GLB,, | Performed by: INTERNAL MEDICINE

## 2023-11-24 PROCEDURE — 1160F RVW MEDS BY RX/DR IN RCRD: CPT | Mod: CPTII,S$GLB,, | Performed by: INTERNAL MEDICINE

## 2023-11-24 PROCEDURE — 1159F MED LIST DOCD IN RCRD: CPT | Mod: CPTII,S$GLB,, | Performed by: INTERNAL MEDICINE

## 2023-11-24 PROCEDURE — 3044F PR MOST RECENT HEMOGLOBIN A1C LEVEL <7.0%: ICD-10-PCS | Mod: CPTII,S$GLB,, | Performed by: INTERNAL MEDICINE

## 2023-11-24 PROCEDURE — 87502 POCT INFLUENZA A/B MOLECULAR: ICD-10-PCS | Mod: QW,S$GLB,, | Performed by: INTERNAL MEDICINE

## 2023-11-24 PROCEDURE — 4010F PR ACE/ARB THEARPY RXD/TAKEN: ICD-10-PCS | Mod: CPTII,S$GLB,, | Performed by: INTERNAL MEDICINE

## 2023-11-24 PROCEDURE — 3044F HG A1C LEVEL LT 7.0%: CPT | Mod: CPTII,S$GLB,, | Performed by: INTERNAL MEDICINE

## 2023-11-24 PROCEDURE — 3075F SYST BP GE 130 - 139MM HG: CPT | Mod: CPTII,S$GLB,, | Performed by: INTERNAL MEDICINE

## 2023-11-24 PROCEDURE — 3060F PR POS MICROALBUMINURIA RESULT DOCUMENTED/REVIEW: ICD-10-PCS | Mod: CPTII,S$GLB,, | Performed by: INTERNAL MEDICINE

## 2023-11-24 PROCEDURE — 1160F PR REVIEW ALL MEDS BY PRESCRIBER/CLIN PHARMACIST DOCUMENTED: ICD-10-PCS | Mod: CPTII,S$GLB,, | Performed by: INTERNAL MEDICINE

## 2023-11-24 RX ORDER — AMOXICILLIN AND CLAVULANATE POTASSIUM 875; 125 MG/1; MG/1
1 TABLET, FILM COATED ORAL EVERY 12 HOURS
Qty: 14 TABLET | Refills: 0 | Status: SHIPPED | OUTPATIENT
Start: 2023-11-24 | End: 2024-02-22

## 2023-11-24 RX ORDER — METHYLPREDNISOLONE 4 MG/1
TABLET ORAL
Qty: 21 EACH | Refills: 0 | Status: SHIPPED | OUTPATIENT
Start: 2023-11-24 | End: 2023-12-15

## 2023-11-24 NOTE — PROGRESS NOTES
Subjective:       Patient ID: Shelley Levy is a 63 y.o. female.    Chief Complaint: Sinus Problem and Cough      HPI  Shelley Levy is a 63 y.o. year old female with t2DM (last A1c 5.9) presents for follow up with sinus congestion, cough, chest congestion. Patient was negative last visit for covid-19 and influenza. Was told to take OTC symptomatic relief. Symptoms have worsened in the last two weeks, cough now productive of green/dark yellow mucous. Coughing up the same.     Review of Systems   Constitutional:  Negative for activity change, appetite change, fatigue, fever and unexpected weight change.   HENT:  Positive for congestion, rhinorrhea and sore throat. Negative for hearing loss, postnasal drip, sneezing, trouble swallowing and voice change.    Eyes:  Negative for pain and discharge.   Respiratory:  Positive for cough and wheezing. Negative for choking, chest tightness and shortness of breath.    Cardiovascular:  Negative for chest pain, palpitations and leg swelling.   Gastrointestinal:  Negative for abdominal distention, abdominal pain, blood in stool, constipation, diarrhea, nausea and vomiting.   Endocrine: Negative for polydipsia and polyuria.   Genitourinary:  Negative for difficulty urinating and flank pain.   Musculoskeletal:  Negative for arthralgias, back pain, joint swelling, myalgias and neck pain.   Skin:  Negative for rash.   Neurological:  Negative for dizziness, tremors, seizures, weakness, numbness and headaches.   Psychiatric/Behavioral:  Negative for agitation. The patient is not nervous/anxious.          Past Medical History:   Diagnosis Date    Type 2 diabetes mellitus with neurologic complication 9/5/2023        Prior to Admission medications    Medication Sig Start Date End Date Taking? Authorizing Provider   blood sugar diagnostic Strp To check BG 4 times daily, to use with insurance preferred meter 7/25/23  Yes Corey Hernandez MD   blood-glucose meter kit To check BG 4 times  "daily, to use with insurance preferred meter 7/25/23 7/24/24 Yes Croey Hernandez MD   calcipotriene (DOVONOX) 0.005 % cream Apply topically 2 (two) times daily. To psoriasis 11/1/23 10/31/24 Yes Ruth Moody MD   clobetasoL (TEMOVATE) 0.05 % cream Apply topically 2 (two) times daily. Use two weeks then take a one week break. Repeat in 2 week intervals. 9/5/23  Yes Joyce Adamson MD   clobetasoL (TEMOVATE) 0.05 % external solution Apply topically 2 (two) times daily. Use two weeks then take a one week break. Repeat in 2 week intervals. 9/5/23  Yes Joyce Adamson MD   cyanocobalamin (VITAMIN B-12) 1000 MCG tablet Take 1 tablet (1,000 mcg total) by mouth once daily. 8/29/23  Yes Corey Hernandez MD   gabapentin (NEURONTIN) 300 MG capsule Take 1 capsule (300 mg total) by mouth 3 (three) times daily. 8/28/23 8/27/24 Yes Corey Hernandez MD   insulin detemir U-100, Levemir, (LEVEMIR FLEXPEN) 100 unit/mL (3 mL) InPn pen Inject 10 Units into the skin every evening. 7/25/23 3/16/25 Yes Corey Hernandez MD   ipratropium (ATROVENT) 21 mcg (0.03 %) nasal spray 2 sprays by Each Nostril route 3 (three) times daily. 11/2/23  Yes Almita Bravo MD   ketoconazole (NIZORAL) 2 % cream Apply topically 2 (two) times daily. To rash under breasts and on back 7/11/23  Yes Ruth Moody MD   ketoconazole (NIZORAL) 2 % shampoo Apply topically once daily. Use in shower on rash, then rinse. 7/11/23  Yes Ruth Moody MD   lancets Misc To check BG 4 times daily, to use with insurance preferred meter 9/25/23  Yes Corey Hernandez MD   losartan (COZAAR) 25 MG tablet Take 0.5 tablets (12.5 mg total) by mouth once daily. 10/23/23 10/22/24 Yes Corey Hernandez MD   metFORMIN (GLUCOPHAGE-XR) 500 MG ER 24hr tablet Take 2 tablets (1,000 mg total) by mouth 2 (two) times daily with meals. 7/25/23 7/24/24 Yes Corey Hernandez MD   OTEZLA 30 mg Tab Take 1 po bid 11/1/23  Yes Ruth Moody MD   pen needle, diabetic 32 gauge x 5/32" Ndle 1 Stick by Misc.(Non-Drug; Combo " "Route) route once daily. 7/25/23  Yes Corey Hernandez MD   pravastatin (PRAVACHOL) 20 MG tablet Take 1 tablet (20 mg total) by mouth once daily. 8/28/23 8/27/24 Yes Corey Hernandez MD   terbinafine HCL (LAMISIL) 250 mg tablet TAKE 1 TABLET BY MOUTH EVERY DAY 11/23/23  Yes Yossi Prado DPM   TRUEPLUS LANCETS 33 gauge Misc Apply topically 4 (four) times daily. 7/25/23  Yes Provider, José Antonio   amoxicillin-clavulanate 875-125mg (AUGMENTIN) 875-125 mg per tablet Take 1 tablet by mouth every 12 (twelve) hours. 11/24/23   Corey Hernandez MD   furosemide (LASIX) 20 MG tablet Take 1 tablet (20 mg total) by mouth once daily. for 20 days 10/5/23 10/25/23  Corey Hernandez MD   methylPREDNISolone (MEDROL DOSEPACK) 4 mg tablet use as directed 11/24/23 12/15/23  Corey Hernandez MD        Past medical history, surgical history, and family medical history reviewed and updated as appropriate.    Medications and allergies reviewed.     Objective:          Vitals:    11/24/23 1620   BP: 136/76   BP Location: Right arm   Patient Position: Sitting   Pulse: 71   SpO2: 98%   Weight: 72.6 kg (160 lb 0.9 oz)   Height: 4' 9.5" (1.461 m)     Body mass index is 34.04 kg/m².  Physical Exam  Constitutional:       Appearance: She is well-developed.   HENT:      Head: Normocephalic and atraumatic.      Ears:      Comments: Hoarse voice     Nose: Rhinorrhea present.      Mouth/Throat:      Pharynx: Posterior oropharyngeal erythema present.   Eyes:      Extraocular Movements: Extraocular movements intact.   Cardiovascular:      Rate and Rhythm: Normal rate and regular rhythm.      Heart sounds: Normal heart sounds.   Pulmonary:      Effort: Pulmonary effort is normal. No respiratory distress.      Breath sounds: Normal breath sounds. No wheezing.   Abdominal:      General: Bowel sounds are normal. There is no distension.      Palpations: Abdomen is soft.      Tenderness: There is no abdominal tenderness.   Musculoskeletal:         General: No tenderness. " Normal range of motion.      Cervical back: Normal range of motion.   Skin:     General: Skin is warm and dry.   Neurological:      Mental Status: She is alert and oriented to person, place, and time.      Cranial Nerves: No cranial nerve deficit.      Deep Tendon Reflexes: Reflexes are normal and symmetric.         Lab Results   Component Value Date    WBC 7.37 10/23/2023    HGB 12.0 10/23/2023    HCT 35.7 (L) 10/23/2023     10/23/2023    CHOL 191 07/12/2023    TRIG 225 (H) 07/12/2023    HDL 32 (L) 07/12/2023    ALT 48 (H) 10/23/2023    AST 28 10/23/2023     10/23/2023    K 4.8 10/23/2023     10/23/2023    CREATININE 0.8 10/23/2023    BUN 23 (H) 10/23/2023    CO2 24 10/23/2023    HGBA1C 5.9 (H) 10/23/2023       Assessment:       1. Acute bronchitis, unspecified organism    2. Atypical pneumonia          Plan:     Shelley was seen today for sinus problem and cough.    Diagnoses and all orders for this visit:    Acute bronchitis, unspecified organism  -     amoxicillin-clavulanate 875-125mg (AUGMENTIN) 875-125 mg per tablet; Take 1 tablet by mouth every 12 (twelve) hours.  -     methylPREDNISolone (MEDROL DOSEPACK) 4 mg tablet; use as directed  -     POCT Influenza A/B Molecular  -     POCT COVID-19 Rapid Screening    Atypical pneumonia  -     POCT Influenza A/B Molecular  -     POCT COVID-19 Rapid Screening    Treat as acute bacterial sinusitis / atypical pneumonia.    Health maintenance reviewed with patient.     Follow up if symptoms worsen or fail to improve.    Corey Hernandez MD  Internal Medicine / Primary Care  [facility]  11/24/2023

## 2023-11-27 RX ORDER — FUROSEMIDE 20 MG/1
20 TABLET ORAL DAILY
Qty: 20 TABLET | Refills: 0 | Status: SHIPPED | OUTPATIENT
Start: 2023-11-27 | End: 2023-12-17

## 2023-12-01 ENCOUNTER — PATIENT MESSAGE (OUTPATIENT)
Dept: DERMATOLOGY | Facility: CLINIC | Age: 63
End: 2023-12-01
Payer: COMMERCIAL

## 2023-12-26 ENCOUNTER — PATIENT MESSAGE (OUTPATIENT)
Dept: INTERNAL MEDICINE | Facility: CLINIC | Age: 63
End: 2023-12-26
Payer: COMMERCIAL

## 2024-01-12 ENCOUNTER — PATIENT MESSAGE (OUTPATIENT)
Dept: DERMATOLOGY | Facility: CLINIC | Age: 64
End: 2024-01-12
Payer: COMMERCIAL

## 2024-02-09 ENCOUNTER — OFFICE VISIT (OUTPATIENT)
Dept: INTERNAL MEDICINE | Facility: CLINIC | Age: 64
End: 2024-02-09
Payer: COMMERCIAL

## 2024-02-09 ENCOUNTER — HOSPITAL ENCOUNTER (OUTPATIENT)
Dept: RADIOLOGY | Facility: HOSPITAL | Age: 64
Discharge: HOME | End: 2024-02-09
Attending: INTERNAL MEDICINE
Payer: COMMERCIAL

## 2024-02-09 VITALS
BODY MASS INDEX: 33.45 KG/M2 | WEIGHT: 159.38 LBS | SYSTOLIC BLOOD PRESSURE: 100 MMHG | HEIGHT: 58 IN | HEART RATE: 93 BPM | OXYGEN SATURATION: 93 % | DIASTOLIC BLOOD PRESSURE: 72 MMHG

## 2024-02-09 DIAGNOSIS — M25.561 ACUTE PAIN OF BOTH KNEES: ICD-10-CM

## 2024-02-09 DIAGNOSIS — E11.65 TYPE 2 DIABETES MELLITUS WITH HYPERGLYCEMIA, WITHOUT LONG-TERM CURRENT USE OF INSULIN: Primary | ICD-10-CM

## 2024-02-09 DIAGNOSIS — M25.562 ACUTE PAIN OF BOTH KNEES: ICD-10-CM

## 2024-02-09 DIAGNOSIS — E11.42 DIABETIC POLYNEUROPATHY ASSOCIATED WITH TYPE 2 DIABETES MELLITUS: ICD-10-CM

## 2024-02-09 DIAGNOSIS — E66.01 CLASS 2 SEVERE OBESITY DUE TO EXCESS CALORIES WITH SERIOUS COMORBIDITY AND BODY MASS INDEX (BMI) OF 36.0 TO 36.9 IN ADULT: ICD-10-CM

## 2024-02-09 PROCEDURE — 99999 PR PBB SHADOW E&M-EST. PATIENT-LVL V: CPT | Mod: PBBFAC,,, | Performed by: INTERNAL MEDICINE

## 2024-02-09 PROCEDURE — 3074F SYST BP LT 130 MM HG: CPT | Mod: CPTII,S$GLB,, | Performed by: INTERNAL MEDICINE

## 2024-02-09 PROCEDURE — 3044F HG A1C LEVEL LT 7.0%: CPT | Mod: CPTII,S$GLB,, | Performed by: INTERNAL MEDICINE

## 2024-02-09 PROCEDURE — 3008F BODY MASS INDEX DOCD: CPT | Mod: CPTII,S$GLB,, | Performed by: INTERNAL MEDICINE

## 2024-02-09 PROCEDURE — 73560 X-RAY EXAM OF KNEE 1 OR 2: CPT | Mod: 26,,, | Performed by: RADIOLOGY

## 2024-02-09 PROCEDURE — 4010F ACE/ARB THERAPY RXD/TAKEN: CPT | Mod: CPTII,S$GLB,, | Performed by: INTERNAL MEDICINE

## 2024-02-09 PROCEDURE — 73560 X-RAY EXAM OF KNEE 1 OR 2: CPT | Mod: TC,50

## 2024-02-09 PROCEDURE — 99214 OFFICE O/P EST MOD 30 MIN: CPT | Mod: S$GLB,,, | Performed by: INTERNAL MEDICINE

## 2024-02-09 PROCEDURE — 3078F DIAST BP <80 MM HG: CPT | Mod: CPTII,S$GLB,, | Performed by: INTERNAL MEDICINE

## 2024-02-09 RX ORDER — GABAPENTIN 300 MG/1
600 CAPSULE ORAL 3 TIMES DAILY
Qty: 180 CAPSULE | Refills: 11 | Status: SHIPPED | OUTPATIENT
Start: 2024-02-09 | End: 2025-02-08

## 2024-02-09 NOTE — PATIENT INSTRUCTIONS
X-ray bilateral knees    IF not improved in 2-4 weeks, schedule orthopedic appointment for your knees.     Increase to 600 mg gabapentin at night time dose  After 1-2 weeks, OK to increase morning dose to 600 mg.   After another 1-2 weeks, increase mid day dose to 600 mg.     Return to clinic in 3 months

## 2024-02-22 ENCOUNTER — OFFICE VISIT (OUTPATIENT)
Dept: PODIATRY | Facility: CLINIC | Age: 64
End: 2024-02-22
Payer: COMMERCIAL

## 2024-02-22 ENCOUNTER — TELEPHONE (OUTPATIENT)
Dept: PODIATRY | Facility: CLINIC | Age: 64
End: 2024-02-22
Payer: COMMERCIAL

## 2024-02-22 VITALS
HEART RATE: 84 BPM | HEIGHT: 58 IN | SYSTOLIC BLOOD PRESSURE: 131 MMHG | WEIGHT: 159 LBS | DIASTOLIC BLOOD PRESSURE: 64 MMHG | BODY MASS INDEX: 33.37 KG/M2

## 2024-02-22 DIAGNOSIS — G57.80 ENTRAPMENT, ILIOINGUINAL NERVE, UNSPECIFIED LATERALITY: Primary | ICD-10-CM

## 2024-02-22 DIAGNOSIS — L84 CORN OR CALLUS: ICD-10-CM

## 2024-02-22 DIAGNOSIS — B35.1 ONYCHOMYCOSIS: ICD-10-CM

## 2024-02-22 DIAGNOSIS — E11.65 TYPE 2 DIABETES MELLITUS WITH HYPERGLYCEMIA, WITHOUT LONG-TERM CURRENT USE OF INSULIN: ICD-10-CM

## 2024-02-22 PROCEDURE — 3044F HG A1C LEVEL LT 7.0%: CPT | Mod: CPTII,S$GLB,, | Performed by: PODIATRIST

## 2024-02-22 PROCEDURE — 3078F DIAST BP <80 MM HG: CPT | Mod: CPTII,S$GLB,, | Performed by: PODIATRIST

## 2024-02-22 PROCEDURE — 11056 PARNG/CUTG B9 HYPRKR LES 2-4: CPT | Mod: S$GLB,,, | Performed by: PODIATRIST

## 2024-02-22 PROCEDURE — 4010F ACE/ARB THERAPY RXD/TAKEN: CPT | Mod: CPTII,S$GLB,, | Performed by: PODIATRIST

## 2024-02-22 PROCEDURE — 11721 DEBRIDE NAIL 6 OR MORE: CPT | Mod: 59,S$GLB,, | Performed by: PODIATRIST

## 2024-02-22 PROCEDURE — 3075F SYST BP GE 130 - 139MM HG: CPT | Mod: CPTII,S$GLB,, | Performed by: PODIATRIST

## 2024-02-22 PROCEDURE — 99213 OFFICE O/P EST LOW 20 MIN: CPT | Mod: 25,S$GLB,, | Performed by: PODIATRIST

## 2024-02-22 PROCEDURE — 3008F BODY MASS INDEX DOCD: CPT | Mod: CPTII,S$GLB,, | Performed by: PODIATRIST

## 2024-02-22 PROCEDURE — 99999 PR PBB SHADOW E&M-EST. PATIENT-LVL IV: CPT | Mod: PBBFAC,,, | Performed by: PODIATRIST

## 2024-02-22 RX ORDER — AMITRIPTYLINE HYDROCHLORIDE 10 MG/1
10 TABLET, FILM COATED ORAL NIGHTLY PRN
Qty: 30 TABLET | Refills: 2 | Status: SHIPPED | OUTPATIENT
Start: 2024-02-22 | End: 2024-05-20

## 2024-02-22 RX ORDER — LIDOCAINE AND PRILOCAINE 25; 25 MG/G; MG/G
CREAM TOPICAL
Qty: 30 G | Refills: 6 | Status: SHIPPED | OUTPATIENT
Start: 2024-02-22

## 2024-02-22 NOTE — PROGRESS NOTES
Subjective:       Patient ID: Shelley Levy is a 63 y.o. female.    Chief Complaint: Follow-up      HPI  Shelley Levy is a 63 y.o. year old female established patient with type 2 diabetes presents for follow-up.  At baseline health.  Has been complaining about increased neuropathic pain to her lower extremities.    Review of Systems   Constitutional:  Negative for activity change, appetite change, fatigue, fever and unexpected weight change.   HENT:  Negative for congestion, hearing loss, postnasal drip, sneezing, sore throat, trouble swallowing and voice change.    Eyes:  Negative for pain and discharge.   Respiratory:  Negative for cough, choking, chest tightness, shortness of breath and wheezing.    Cardiovascular:  Negative for chest pain, palpitations and leg swelling.   Gastrointestinal:  Negative for abdominal distention, abdominal pain, blood in stool, constipation, diarrhea, nausea and vomiting.   Endocrine: Negative for polydipsia and polyuria.   Genitourinary:  Negative for difficulty urinating and flank pain.   Musculoskeletal:  Negative for arthralgias, back pain, joint swelling, myalgias and neck pain.   Skin:  Negative for rash.   Neurological:  Negative for dizziness, tremors, seizures, weakness, numbness and headaches.   Psychiatric/Behavioral:  Negative for agitation. The patient is not nervous/anxious.          Past Medical History:   Diagnosis Date    Type 2 diabetes mellitus with neurologic complication 9/5/2023        Prior to Admission medications    Medication Sig Start Date End Date Taking? Authorizing Provider   blood sugar diagnostic Strp To check BG 4 times daily, to use with insurance preferred meter 7/25/23  Yes Corey Hernandez MD   blood-glucose meter kit To check BG 4 times daily, to use with insurance preferred meter 7/25/23 7/24/24 Yes Corey Hernandez MD   calcipotriene (DOVONOX) 0.005 % cream Apply topically 2 (two) times daily. To psoriasis 11/1/23 10/31/24 Yes Ruth Moody  "MD   cyanocobalamin (VITAMIN B-12) 1000 MCG tablet Take 1 tablet (1,000 mcg total) by mouth once daily. 8/29/23  Yes Corey Hernandez MD   insulin detemir U-100, Levemir, (LEVEMIR FLEXPEN) 100 unit/mL (3 mL) InPn pen Inject 10 Units into the skin every evening. 7/25/23 3/16/25 Yes Corey Hernandez MD   lancets Misc To check BG 4 times daily, to use with insurance preferred meter 9/25/23  Yes Corey Hernandez MD   losartan (COZAAR) 25 MG tablet Take 0.5 tablets (12.5 mg total) by mouth once daily. 10/23/23 10/22/24 Yes Corey Hernandez MD   metFORMIN (GLUCOPHAGE-XR) 500 MG ER 24hr tablet Take 2 tablets (1,000 mg total) by mouth 2 (two) times daily with meals. 7/25/23 7/24/24 Yes Corey Hernandez MD   pen needle, diabetic 32 gauge x 5/32" Ndle 1 Stick by Misc.(Non-Drug; Combo Route) route once daily. 7/25/23  Yes Croey Hernandez MD   pravastatin (PRAVACHOL) 20 MG tablet Take 1 tablet (20 mg total) by mouth once daily. 11/27/23 11/26/24 Yes Corey Hernandez MD   terbinafine HCL (LAMISIL) 250 mg tablet TAKE 1 TABLET BY MOUTH EVERY DAY  Patient not taking: Reported on 2/22/2024 11/23/23  Yes Yossi Prado DPM   TRUEPLUS LANCETS 33 gauge Misc Apply topically 4 (four) times daily. 7/25/23  Yes Provider, Historical   amoxicillin-clavulanate 875-125mg (AUGMENTIN) 875-125 mg per tablet Take 1 tablet by mouth every 12 (twelve) hours.  Patient not taking: Reported on 2/22/2024 11/24/23 2/22/24 Yes Corey Hernandez MD   amitriptyline (ELAVIL) 10 MG tablet Take 1 tablet (10 mg total) by mouth nightly as needed for Insomnia. 2/22/24 2/21/25  Yossi Prado DPM   clobetasoL (TEMOVATE) 0.05 % cream Apply topically 2 (two) times daily. Use two weeks then take a one week break. Repeat in 2 week intervals.  Patient not taking: Reported on 2/9/2024 9/5/23   Joyce Adamson MD   clobetasoL (TEMOVATE) 0.05 % external solution Apply topically 2 (two) times daily. Use two weeks then take a one week break. Repeat in 2 week intervals.  Patient not taking: " "Reported on 2/9/2024 9/5/23   Joyce Adamson MD   furosemide (LASIX) 20 MG tablet TAKE 1 TABLET (20 MG TOTAL) BY MOUTH ONCE DAILY FOR 20 DAYS 11/27/23 12/17/23  Corey Hernandez MD   gabapentin (NEURONTIN) 300 MG capsule Take 2 capsules (600 mg total) by mouth 3 (three) times daily. 2/9/24 2/8/25  Corey Hernandez MD   ipratropium (ATROVENT) 21 mcg (0.03 %) nasal spray 2 sprays by Each Nostril route 3 (three) times daily.  Patient not taking: Reported on 2/9/2024 11/2/23   Almita Bravo MD   ketoconazole (NIZORAL) 2 % cream Apply topically 2 (two) times daily. To rash under breasts and on back  Patient not taking: Reported on 2/9/2024 7/11/23   Ruth Moody MD   ketoconazole (NIZORAL) 2 % shampoo Apply topically once daily. Use in shower on rash, then rinse.  Patient not taking: Reported on 2/9/2024 7/11/23   Ruth Moody MD   LIDOcaine-prilocaine (EMLA) cream Apply topically as needed. 2/22/24   Yossi Prado DPM   OTEZLA 30 mg Tab Take 1 po bid  Patient not taking: Reported on 2/9/2024 11/1/23   Ruth Moody MD        Past medical history, surgical history, and family medical history reviewed and updated as appropriate.    Medications and allergies reviewed.     Objective:          Vitals:    02/09/24 1319   BP: 100/72   BP Location: Left arm   Patient Position: Sitting   BP Method: Medium (Manual)   Pulse: 93   SpO2: (!) 93%   Weight: 72.3 kg (159 lb 6.3 oz)   Height: 4' 9.5" (1.461 m)     Body mass index is 33.89 kg/m².  Physical Exam  Constitutional:       Appearance: She is well-developed.   HENT:      Head: Normocephalic and atraumatic.   Eyes:      Extraocular Movements: Extraocular movements intact.   Cardiovascular:      Rate and Rhythm: Normal rate and regular rhythm.      Heart sounds: Normal heart sounds.   Pulmonary:      Effort: Pulmonary effort is normal. No respiratory distress.      Breath sounds: Normal breath sounds. No wheezing.   Abdominal:      General: Bowel sounds are normal. There is " no distension.      Palpations: Abdomen is soft.      Tenderness: There is no abdominal tenderness.   Musculoskeletal:         General: No tenderness. Normal range of motion.      Cervical back: Normal range of motion.   Skin:     General: Skin is warm and dry.   Neurological:      Mental Status: She is alert and oriented to person, place, and time.      Cranial Nerves: No cranial nerve deficit.      Deep Tendon Reflexes: Reflexes are normal and symmetric.         Lab Results   Component Value Date    WBC 7.37 10/23/2023    HGB 12.0 10/23/2023    HCT 35.7 (L) 10/23/2023     10/23/2023    CHOL 191 07/12/2023    TRIG 225 (H) 07/12/2023    HDL 32 (L) 07/12/2023    ALT 48 (H) 10/23/2023    AST 28 10/23/2023     02/09/2024    K 4.3 02/09/2024     02/09/2024    CREATININE 0.8 02/09/2024    BUN 30 (H) 02/09/2024    CO2 27 02/09/2024    HGBA1C 5.5 02/09/2024       Assessment:       1. Type 2 diabetes mellitus with hyperglycemia, without long-term current use of insulin    2. Acute pain of both knees    3. Diabetic polyneuropathy associated with type 2 diabetes mellitus    4. Class 2 severe obesity due to excess calories with serious comorbidity and body mass index (BMI) of 36.0 to 36.9 in adult          Plan:     Shelley was seen today for follow-up.    Diagnoses and all orders for this visit:    Type 2 diabetes mellitus with hyperglycemia, without long-term current use of insulin  -     HEMOGLOBIN A1C; Future  -     BASIC METABOLIC PANEL; Future    Acute pain of both knees  -     X-Ray Knee 1 or 2 View Bilateral; Future  -     Ambulatory referral/consult to Orthopedics; Future    Diabetic polyneuropathy associated with type 2 diabetes mellitus  Comments:  gabapentin, needs foot exam with podiatry  Orders:  -     gabapentin (NEURONTIN) 300 MG capsule; Take 2 capsules (600 mg total) by mouth 3 (three) times daily.    Class 2 severe obesity due to excess calories with serious comorbidity and body mass index  (BMI) of 36.0 to 36.9 in adult  Comments:  discussed lifestyle changes    Will up titrate gabapentin.    Health maintenance reviewed with patient.    Follow up in about 3 months (around 5/9/2024).    Corey Hernandez MD  Internal Medicine / Primary Care  [facility]  2/9/2024

## 2024-02-28 ENCOUNTER — TELEPHONE (OUTPATIENT)
Dept: OPHTHALMOLOGY | Facility: CLINIC | Age: 64
End: 2024-02-28
Payer: COMMERCIAL

## 2024-02-29 ENCOUNTER — OFFICE VISIT (OUTPATIENT)
Dept: OPTOMETRY | Facility: CLINIC | Age: 64
End: 2024-02-29
Payer: COMMERCIAL

## 2024-02-29 DIAGNOSIS — H25.13 NUCLEAR SCLEROSIS OF BOTH EYES: ICD-10-CM

## 2024-02-29 DIAGNOSIS — H26.9 CORTICAL CATARACT OF BOTH EYES: ICD-10-CM

## 2024-02-29 DIAGNOSIS — H53.8 BLURRED VISION, BILATERAL: Primary | ICD-10-CM

## 2024-02-29 DIAGNOSIS — E11.3299 BACKGROUND DIABETIC RETINOPATHY ASSOCIATED WITH TYPE 2 DIABETES MELLITUS: ICD-10-CM

## 2024-02-29 DIAGNOSIS — H53.002 AMBLYOPIA EX ANOPSIA OF LEFT EYE: ICD-10-CM

## 2024-02-29 DIAGNOSIS — E11.311 DIABETIC MACULAR EDEMA OF BOTH EYES: ICD-10-CM

## 2024-02-29 PROCEDURE — 99499 UNLISTED E&M SERVICE: CPT | Mod: S$GLB,,, | Performed by: OPTOMETRIST

## 2024-02-29 PROCEDURE — 99999 PR PBB SHADOW E&M-EST. PATIENT-LVL IV: CPT | Mod: PBBFAC,,, | Performed by: OPTOMETRIST

## 2024-02-29 NOTE — PROGRESS NOTES
"HPI    Patient in for progress check.    Being followed for (diagnosis): Blurred vision has returned.  History of amblyopia in left eye.  IDDM.  Takes insulin and Metformin.   Ms. Levy reports that VA in OD was okay with the last spectacle lens Rx   issued until about a month afterward, and then vision became blurry again.      Has been diagnosed with neuropathy - advised likely 2/2 diabetes.    Fasting blood glucose level has been from 80s to 120s, typically.    Distance VA in OD seems to be the same with and without glasses, and feels   need for hand-held magnifying lens for seeing small print.    Vision seems "foggy", and reports problems with bright light, especially   on clara days.         Date last seen: 10/26/2023    Doctor last seen:  Dr. Slade    Prescribed eye medications(s) using:  TheraTears and Flax seed oil with   omega 3    OTC eye medication(s) using:  Refresh    Signs/symptoms of condition resolved/better/stable/worse?:  Still having a   foggy vision seeing shadows when looking in distance.              Last edited by Jason Slade, OD on 2/29/2024  1:27 PM.            Assessment /Plan     For exam results, see Encounter Report.    Blurred vision, bilateral    Nuclear sclerosis of both eyes    Cortical cataract of both eyes    Amblyopia ex anopsia of left eye    Background diabetic retinopathy associated with type 2 diabetes mellitus    Diabetic macular edema of both eyes                 History of insulin-dependant type 2 diabetes.  Mrs Levy reports that she feels that her blood glucose level has been under good control recently - typically 80 - 120 mg/dL   Blood glucose today of 80 mg/dL in office today.    Mrs. Levy returns today with report that she had been having good visual acuity in the right eye with the last spectacle lens Rx issued for approximately one month after receiving the last spectacle lens Rx.    However, thereafter she noted a bothersome decrease in visual acuity " in the right eye, and her vision has remained blurred.     Bilateral background diabetic retinopathy, as noted previously.    OTC of retina at the macula done today, and results are consistent with bilateral diabetic macular edema.    Note history of amblyopia in the left eye.    Discussed with Mrs. Levy.  Suggest consult with retina specialist for evaluation and treatment as deemed appropriate.    Return to me (Dr. Slade) when discharged from retina specialist for recheck of refraction and assessment of best-corrected visual acuity.

## 2024-03-01 NOTE — PATIENT INSTRUCTIONS
History of insulin-dependant type 2 diabetes.  Mrs Levy reports that she feels that her blood glucose level has been under good control recently - typically 80 - 120 mg/dL   Blood glucose today of 80 mg/dL in office today.    Mrs. Levy returns today with report that she had been having good visual acuity in the right eye with the last spectacle lens Rx issued for approximately one month after receiving the last spectacle lens Rx.    However, thereafter she noted a bothersome decrease in visual acuity in the right eye, and her vision has remained blurred.     Bilateral background diabetic retinopathy, as noted previously.    OTC of retina at the macula done today, and results are consistent with bilateral diabetic macular edema.    Note history of amblyopia in the left eye.    Discussed with Mrs. Levy.  Suggest consult with retina specialist for evaluation and treatment as deemed appropriate.    Return to me (Dr. Slade) when discharged from retina specialist for recheck of refraction and assessment of best-corrected visual acuity.

## 2024-03-06 NOTE — PROGRESS NOTES
Subjective:      Patient ID: Shelley Levy is a 63 y.o. female.    Chief Complaint:   Diabetic Foot Exam (2/9/24 - Corey Hernandez MD, PCP) and Routine Foot Care    Shelley is a 63 y.o. female who presents to the clinic complaining of thick and discolored toenails on both feet. Shelley is inquiring about treatment options.  Addition issues with neuropathy symptoms.  Here for routine diabetic foot evaluation and foot care.    Review of Systems   Constitutional: Negative for chills, decreased appetite, fever and malaise/fatigue.   HENT:  Negative for congestion, hearing loss, nosebleeds and tinnitus.    Eyes:  Negative for double vision, pain, photophobia and visual disturbance.   Cardiovascular:  Negative for chest pain, claudication, cyanosis and leg swelling.   Respiratory:  Negative for cough, hemoptysis, shortness of breath and wheezing.    Endocrine: Negative for cold intolerance and heat intolerance.   Hematologic/Lymphatic: Negative for adenopathy and bleeding problem.   Skin:  Positive for color change and nail changes. Negative for dry skin, itching and suspicious lesions.   Musculoskeletal:  Positive for arthritis. Negative for joint pain, myalgias and stiffness.   Gastrointestinal:  Negative for abdominal pain, jaundice, nausea and vomiting.   Genitourinary:  Negative for dysuria, frequency and hematuria.   Neurological:  Negative for difficulty with concentration, loss of balance, numbness, paresthesias and sensory change.   Psychiatric/Behavioral:  Negative for altered mental status, hallucinations and suicidal ideas. The patient is not nervous/anxious.    Allergic/Immunologic: Negative for environmental allergies and persistent infections.           Objective:      Physical Exam  Vitals reviewed.   Constitutional:       Appearance: She is well-developed.   HENT:      Head: Normocephalic and atraumatic.   Cardiovascular:      Pulses:           Dorsalis pedis pulses are 2+ on the right side and 2+ on the  January 22, 2019     Patient: Ibis Mai   YOB: 1988   Date of Visit: 1/22/2019       To Whom It May Concern: It is my medical opinion that Stuart Darby may return to work on 01/24/2019  If you have any questions or concerns, please don't hesitate to call           Sincerely,        Macy Perla DO    CC: No Recipients left side.        Posterior tibial pulses are 2+ on the right side and 2+ on the left side.   Pulmonary:      Effort: Pulmonary effort is normal.   Musculoskeletal:         General: Normal range of motion.      Comments: Inspection and palpation of the muscles joints and bones of both lower extremities reveal that muscle strength for the anterior lateral and posterior muscle groups and intrinsic muscle groups of the foot are all 5 over 5 symmetrical.  Ankle subtalar midtarsal and digital joint range of motion are within normal limits, nonpainful, without crepitus or effusion.  Patient exhibits a normal angle and base of gait.  Palpation of the tendons reveal no defects.   Skin:     General: Skin is warm and dry.      Capillary Refill: Capillary refill takes 2 to 3 seconds.      Comments: Skin turgor is normal bilaterally.  Skin texture is well hydrated to both lower extremities.    Bilateral hyperkeratotic lesions noted sub 1st MPJ.    Thickened, discolored  toenails 1-5 with subungual debris     Neurological:      Mental Status: She is alert and oriented to person, place, and time.      Comments: Sharp dull light touch vibratory proprioceptive sensation are intact bilaterally.  Deep tendon reflexes to patellar and Achilles tendon are symmetrical 2 over 4 bilaterally.  No ankle clonus or Babinski reflexes noted bilaterally.  Coordination is normal to both feet and lower extremities.   Psychiatric:         Behavior: Behavior normal.             Assessment:       Encounter Diagnoses   Name Primary?    Entrapment, ilioinguinal nerve, unspecified laterality Yes    Type 2 diabetes mellitus with hyperglycemia, without long-term current use of insulin     Corn or callus     Onychomycosis      Independent visualization of imaging was performed.  Results were reviewed in detail with patient.       Plan:       Shelley was seen today for diabetic foot exam and routine foot care.    Diagnoses and all orders for this  visit:    Entrapment, ilioinguinal nerve, unspecified laterality  -     LIDOcaine-prilocaine (EMLA) cream; Apply topically as needed.    Type 2 diabetes mellitus with hyperglycemia, without long-term current use of insulin    Corn or callus    Onychomycosis    Other orders  -     amitriptyline (ELAVIL) 10 MG tablet; Take 1 tablet (10 mg total) by mouth nightly as needed for Insomnia.      I counseled the patient on her conditions, their implications and medical management.    The nature of the condition, options for management, as well as potential risks and complications were discussed in detail with patient. Patient was amenable to my recommendations and left my office fully informed and will follow up as instructed or sooner if necessary.      Decision making:  Chronic illnesses discussed in detail, previous records/notes and imaging independently reviewed, prescription drug management performed in addition to lengthy discussion regarding both conservative and surgical treatment options.    The nature of the condition, options for management, as well as potential risks and complications were discussed in detail with patient. Patient was amenable to my recommendations and left my office fully informed and will follow up as instructed or sooner if necessary.      Routine Foot Care    Performed by:  Yossi Prado. DPM  Authorized by:  Patient     Consent Done?:  Yes (Verbal)     Nail Care Type:  Debride  Location(s): All  (Left 1st Toe, Left 3rd Toe, Left 2nd Toe, Left 4th Toe, Left 5th Toe, Right 1st Toe, Right 2nd Toe, Right 3rd Toe, Right 4th Toe and Right 5th Toe)  Patient tolerance:  Patient tolerated the procedure well with no immediate complications     With patient's permission, the toenails mentioned above were aggressively reduced and debrided using a nail nipper, removing all offending nail and debris. The patient will continue to monitor the areas daily, inspect the feet, wear protective shoe gear  when ambulatory, and moisturizer to maintain skin integrity.      Callus Care Type: Debride    With patient's permission, the calluses/hyperkeratotic lesions mentioned above were aggressively reduced and debrided using a number 15 blade. The patient will continue to monitor the areas daily, inspect the feet, wear protective shoe gear when ambulatory, and moisturizer to maintain skin integrity.     Shoe inspection. Diabetic Foot Education. Patient reminded of the importance of good nutrition and blood sugar control to help prevent podiatric complications of diabetes. Patient instructed on proper foot hygeine. We discussed wearing proper shoe gear, daily foot inspections and Diabetic foot education in detail.    Return to clinic in 3-6 months or sooner if problems arise

## 2024-03-08 ENCOUNTER — OFFICE VISIT (OUTPATIENT)
Dept: OPHTHALMOLOGY | Facility: CLINIC | Age: 64
End: 2024-03-08
Payer: COMMERCIAL

## 2024-03-08 DIAGNOSIS — H43.813 VITREOUS DEGENERATION OF BOTH EYES: ICD-10-CM

## 2024-03-08 DIAGNOSIS — H25.13 AGE-RELATED NUCLEAR CATARACT OF BOTH EYES: ICD-10-CM

## 2024-03-08 DIAGNOSIS — E11.3313 MODERATE NONPROLIFERATIVE DIABETIC RETINOPATHY OF BOTH EYES WITH MACULAR EDEMA ASSOCIATED WITH TYPE 2 DIABETES MELLITUS: Primary | ICD-10-CM

## 2024-03-08 DIAGNOSIS — H35.033 HYPERTENSIVE RETINOPATHY OF BOTH EYES: ICD-10-CM

## 2024-03-08 DIAGNOSIS — E11.311 DIABETIC MACULAR EDEMA OF BOTH EYES: ICD-10-CM

## 2024-03-08 PROCEDURE — 67028 INJECTION EYE DRUG: CPT | Mod: 50,S$GLB,, | Performed by: OPHTHALMOLOGY

## 2024-03-08 PROCEDURE — 67028 INJECTION EYE DRUG: CPT | Mod: 50,PBBFAC | Performed by: OPHTHALMOLOGY

## 2024-03-08 PROCEDURE — 92134 CPTRZ OPH DX IMG PST SGM RTA: CPT | Mod: PBBFAC | Performed by: OPHTHALMOLOGY

## 2024-03-08 PROCEDURE — 99204 OFFICE O/P NEW MOD 45 MIN: CPT | Mod: 25,S$GLB,, | Performed by: OPHTHALMOLOGY

## 2024-03-08 PROCEDURE — 99999 PR PBB SHADOW E&M-EST. PATIENT-LVL IV: CPT | Mod: PBBFAC,,, | Performed by: OPHTHALMOLOGY

## 2024-03-08 RX ADMIN — Medication 1.25 MG: at 02:03

## 2024-03-08 NOTE — PROGRESS NOTES
HPI     [facility] retina clinic      DFE      Additional comments: DFE OU   OCT OU   BLURRED VA OU   AMBLYOPIA       DM   LAST BS     80 to  120  LAST A1C    6.5  REFERRED BY DR POTTS    No prism  in glasses    Never  patched  as a child   Dryness ou       Gtts   thera tears ou   qid            Last edited by Genevieve Rodriguez on 3/8/2024  1:20 PM.         A/P    ICD-10-CM ICD-9-CM   1. 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 Diabetic macular edema of both eyes  E11.311 250.50     362.07     362.01   3. Vitreous degeneration of both eyes  H43.813 379.21   4. Hypertensive retinopathy of both eyes  H35.033 362.11   5. Age-related nuclear cataract of both eyes  H25.13 366.16       1. Moderate nonproliferative diabetic retinopathy of both eyes with macular edema associated with type 2 diabetes mellitus  2. Diabetic macular edema of both eyes  PCP  Corey Hernandez MD - Recent notes reviewed   02/09/2024  5.5  A1C    Referral from Dr. Potts for retina eval -Recent notes reviewed     OD: no injections or laser  VA 20/40, mod DR with DME involving fovea  Plan: given foveal DME, recommend Avastin OD in better seeing eye     OS: no injections or laser  VA 20/400, hx of amblyopia, but VA was better in past upon review, has foveal DME and significant cataract  Plan: recommend Avastin OS to get DME under better control before sending for CEIOL    Based on todays exam, diagnostic studies, and review of records, the determination was made for treatment today.  Schedule Avastin Injection today BOTH Eye Patient chooses to proceed with injection R/B/A discussed include infection retinal detachment and stroke    Patient identified.  Timeout performed.    Risks, benefits, and alternatives to treatment were discussed in detail with the patient, including bleeding/infection (endophthalmitis)/etc.  The patient  voiced understanding and wished to proceed with the procedure.  See separate consent form.    Injection Procedure Note:  Diagnosis: DME BOTH Eye    First we did the right eye     Topical Proparacaine drop placed then topical 5% Betadine and then subconjunctival lidocaine 2% injection  Sterile gloves used, and sterile lid speculum placed.  5% Betadine placed at injection site again prior to injection.  Avastin 1.25mg in 0.05cc Injected inferotemporally 3.5-4mm posterior to the limbus.  Complications: None  Va at least CF at 5 feet post injection.  Retina, ONH, IOP normal after injection.    The same was performed for the left eye     Followup as below.  Patient should return immediately PRN.  Retinal Detachment and Endophthalmitis precautions given.     Recommend good blood pressure control, tight blood glucose control, and good cholesterol control      3. Vitreous degeneration of both eyes  No RT/RD  Plan: Observation      4. Hypertensive retinopathy of both eyes  Mild HTN changes  Plan: Observation  Recommend good blood pressure control, tight blood glucose control, and good cholesterol control     5. Age-related nuclear cataract of both eyes  VS NS  Amblyopia OS  Plan: will wait to refer for CEIOL once we get DME under better control    RTC 1 mo DFE/OCTm OU, poss Avastin OU        I saw and examined the patient and reviewed in detail the findings documented. The final examination findings, image interpretations which have been independently interpreted, and plan as documented in the record represent my personal judgment and conclusions.    Amari Tran MD  Vitreoretinal Surgery   [facility]

## 2024-03-15 ENCOUNTER — OFFICE VISIT (OUTPATIENT)
Dept: INTERNAL MEDICINE | Facility: CLINIC | Age: 64
End: 2024-03-15
Payer: COMMERCIAL

## 2024-03-15 VITALS
DIASTOLIC BLOOD PRESSURE: 72 MMHG | OXYGEN SATURATION: 96 % | WEIGHT: 156.5 LBS | SYSTOLIC BLOOD PRESSURE: 128 MMHG | BODY MASS INDEX: 32.85 KG/M2 | HEIGHT: 58 IN | HEART RATE: 70 BPM

## 2024-03-15 DIAGNOSIS — L97.301: Primary | ICD-10-CM

## 2024-03-15 DIAGNOSIS — E11.622: Primary | ICD-10-CM

## 2024-03-15 PROCEDURE — 99999 PR PBB SHADOW E&M-EST. PATIENT-LVL V: CPT | Mod: PBBFAC,,, | Performed by: INTERNAL MEDICINE

## 2024-03-15 PROCEDURE — 99215 OFFICE O/P EST HI 40 MIN: CPT | Mod: PBBFAC | Performed by: INTERNAL MEDICINE

## 2024-03-15 NOTE — PATIENT INSTRUCTIONS
Keep area clean / dry.  Cover with loose non adhesive dressing.  Protect area with socks  OK to wash with warm water and antibacterial soap, pat dry.   Do not apply any alcohol, peroxide, disinfectant as this may aggravate the healing process.   OK to stop using neosporin / triple antibiotic on that area as does not appear to be infected.     Follow up with podiatry as scheduled.    Let office know if symptoms worsen.     Return to clinic in 6 months or sooner if needed.

## 2024-03-15 NOTE — PROGRESS NOTES
Subjective:       Patient ID: Shelley Levy is a 63 y.o. female.    Chief Complaint: Wound Check      HPI  Shelley Levy is a 63 y.o. year old female with type 2 diabetes with diabetic polyneuropathy presents for follow-up of wound on medial aspect of right ankle above the sock line.  Patient reports that her tennis shoes would rub her ankle and led to a area of skin breakdown.  Patient has been dressing wound with antibiotic ointment.  Patient initially cleaned the wound with peroxide.  Patient denies any fevers chills.  Denies any streaking of the wound towards her ankle or knee.  Patient is able to ambulate without issue.  Patient does report that in the evenings her legs can swell and that will cause the wound to ache more than usual.    Review of Systems   Constitutional:  Negative for activity change, appetite change, fatigue, fever and unexpected weight change.   HENT:  Negative for congestion, hearing loss, postnasal drip, sneezing, sore throat, trouble swallowing and voice change.    Eyes:  Negative for pain and discharge.   Respiratory:  Negative for cough, choking, chest tightness, shortness of breath and wheezing.    Cardiovascular:  Negative for chest pain, palpitations and leg swelling.   Gastrointestinal:  Negative for abdominal distention, abdominal pain, blood in stool, constipation, diarrhea, nausea and vomiting.   Endocrine: Negative for polydipsia and polyuria.   Genitourinary:  Negative for difficulty urinating and flank pain.   Musculoskeletal:  Negative for arthralgias, back pain, joint swelling, myalgias and neck pain.   Skin:  Positive for wound. Negative for rash.   Neurological:  Negative for dizziness, tremors, seizures, weakness, numbness and headaches.   Psychiatric/Behavioral:  Negative for agitation. The patient is not nervous/anxious.          Past Medical History:   Diagnosis Date    Type 2 diabetes mellitus with neurologic complication 9/5/2023        Prior to Admission  medications    Medication Sig Start Date End Date Taking? Authorizing Provider   amitriptyline (ELAVIL) 10 MG tablet Take 1 tablet (10 mg total) by mouth nightly as needed for Insomnia. 2/22/24 2/21/25 Yes Yossi Prado DPM   blood sugar diagnostic Strp To check BG 4 times daily, to use with insurance preferred meter 7/25/23  Yes Corey Hernandez MD   blood-glucose meter kit To check BG 4 times daily, to use with insurance preferred meter 7/25/23 7/24/24 Yes Corey Hernandez MD   calcipotriene (DOVONOX) 0.005 % cream Apply topically 2 (two) times daily. To psoriasis 11/1/23 10/31/24 Yes Ruth Moody MD   clobetasoL (TEMOVATE) 0.05 % cream Apply topically 2 (two) times daily. Use two weeks then take a one week break. Repeat in 2 week intervals. 9/5/23  Yes Joyce Adamson MD   clobetasoL (TEMOVATE) 0.05 % external solution Apply topically 2 (two) times daily. Use two weeks then take a one week break. Repeat in 2 week intervals. 9/5/23  Yes Joyce Adamson MD   cyanocobalamin (VITAMIN B-12) 1000 MCG tablet Take 1 tablet (1,000 mcg total) by mouth once daily. 8/29/23  Yes Corey Hernandez MD   gabapentin (NEURONTIN) 300 MG capsule Take 2 capsules (600 mg total) by mouth 3 (three) times daily. 2/9/24 2/8/25 Yes Corey Hernandez MD   insulin detemir U-100, Levemir, (LEVEMIR FLEXPEN) 100 unit/mL (3 mL) InPn pen Inject 10 Units into the skin every evening. 7/25/23 3/16/25 Yes Corey Hernandez MD   ipratropium (ATROVENT) 21 mcg (0.03 %) nasal spray 2 sprays by Each Nostril route 3 (three) times daily. 11/2/23  Yes Almita Bravo MD   ketoconazole (NIZORAL) 2 % cream Apply topically 2 (two) times daily. To rash under breasts and on back 7/11/23  Yes Ruth Moody MD   ketoconazole (NIZORAL) 2 % shampoo Apply topically once daily. Use in shower on rash, then rinse. 7/11/23  Yes Ruth Moody MD   lancets Misc To check BG 4 times daily, to use with insurance preferred meter 9/25/23  Yes Corey Hernandez MD   LIDOcaine-prilocaine (EMLA)  "cream Apply topically as needed. 2/22/24  Yes Yossi Prado DPM   losartan (COZAAR) 25 MG tablet Take 0.5 tablets (12.5 mg total) by mouth once daily. 10/23/23 10/22/24 Yes Corey Hernandez MD   metFORMIN (GLUCOPHAGE-XR) 500 MG ER 24hr tablet Take 2 tablets (1,000 mg total) by mouth 2 (two) times daily with meals. 7/25/23 7/24/24 Yes Corey Hernandez MD   OTEZLA 30 mg Tab Take 1 po bid 11/1/23  Yes Ruth Moody MD   pen needle, diabetic 32 gauge x 5/32" Ndle 1 Stick by Misc.(Non-Drug; Combo Route) route once daily. 7/25/23  Yes Corey Hernandez MD   pravastatin (PRAVACHOL) 20 MG tablet Take 1 tablet (20 mg total) by mouth once daily. 11/27/23 11/26/24 Yes Corey Hernandez MD   terbinafine HCL (LAMISIL) 250 mg tablet TAKE 1 TABLET BY MOUTH EVERY DAY 11/23/23  Yes Yossi Prado DPM   TRUEPLUS LANCETS 33 gauge Misc Apply topically 4 (four) times daily. 7/25/23  Yes Provider, José Antonio   furosemide (LASIX) 20 MG tablet TAKE 1 TABLET (20 MG TOTAL) BY MOUTH ONCE DAILY FOR 20 DAYS 11/27/23 12/17/23  Corey Hernandez MD        Past medical history, surgical history, and family medical history reviewed and updated as appropriate.    Medications and allergies reviewed.     Objective:          Vitals:    03/15/24 1402   BP: 128/72   BP Location: Right arm   Patient Position: Sitting   Pulse: 70   SpO2: 96%   Weight: 71 kg (156 lb 8.4 oz)   Height: 4' 9.5" (1.461 m)     Body mass index is 33.29 kg/m².  Physical Exam  Constitutional:       General: She is not in acute distress.     Appearance: Normal appearance. She is not toxic-appearing.   HENT:      Head: Normocephalic and atraumatic.   Cardiovascular:      Rate and Rhythm: Normal rate and regular rhythm.   Pulmonary:      Effort: Pulmonary effort is normal.   Skin:     Comments: See image below   Neurological:      General: No focal deficit present.      Mental Status: She is alert and oriented to person, place, and time.      Sensory: Sensory deficit (diabetic polyneuropathy) " present.             Lab Results   Component Value Date    WBC 7.37 10/23/2023    HGB 12.0 10/23/2023    HCT 35.7 (L) 10/23/2023     10/23/2023    CHOL 191 07/12/2023    TRIG 225 (H) 07/12/2023    HDL 32 (L) 07/12/2023    ALT 48 (H) 10/23/2023    AST 28 10/23/2023     02/09/2024    K 4.3 02/09/2024     02/09/2024    CREATININE 0.8 02/09/2024    BUN 30 (H) 02/09/2024    CO2 27 02/09/2024    HGBA1C 5.5 02/09/2024       Assessment:       1. 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  Patient to continue dressing wound with gauze, wash with warm water and soap.  Discussed avoiding apply further ointments.    Area appears to be healing well.  No surrounding cellulitis.  Patient has follow-up scheduled with Podiatry upcoming.  Patient knows to contact the office should her wound get worse or shows signs of infection.  Patient also with blotchy circular rash on both lower extremities.  Concerns for possible tinea infection, patient to use ketoconazole shampoo twice a week to affected area for the next 2-3 weeks.    Health maintenance reviewed with patient.     Follow up in about 6 months (around 9/15/2024).    Corey Hernandez MD  Internal Medicine / Primary Care  [facility]  3/15/2024

## 2024-03-18 ENCOUNTER — TELEPHONE (OUTPATIENT)
Dept: PODIATRY | Facility: CLINIC | Age: 64
End: 2024-03-18

## 2024-03-18 ENCOUNTER — PATIENT MESSAGE (OUTPATIENT)
Dept: PODIATRY | Facility: CLINIC | Age: 64
End: 2024-03-18

## 2024-03-18 NOTE — TELEPHONE ENCOUNTER
[facility]  Vascular Neurology  Comprehensive Stroke Center  Progress Note    Neurologic Chief Complaint: left hemiparesis    Subjective:     Interval History: Patient is seen for follow-up neurological assessment and treatment recommendations:   NG tube to be placed on fluoro today    HPI, Past Medical, Family, and Social History remains the same as documented in the initial encounter.     Review of Systems   Constitutional: Negative for fever.   Eyes: Negative for visual disturbance.   Respiratory: Negative for cough and shortness of breath.    Cardiovascular: Negative for chest pain.     Scheduled Meds:   aspirin  81 mg Per NG tube Daily    atorvastatin  40 mg Per NG tube Daily    carvedilol  6.25 mg Oral BID    clopidogrel  75 mg Per NG tube Daily    heparin (porcine)  5,000 Units Subcutaneous Q8H    pantoprazole  40 mg Oral Daily    sodium chloride 0.9%  3 mL Intravenous Q8H     Continuous Infusions:   sodium chloride 0.9%       PRN Meds:acetaminophen, labetalol, sodium chloride 0.9%    Objective:     Vital Signs (Most Recent):  Temp: 97.8 °F (36.6 °C) (06/17/17 1200)  Pulse: 70 (06/17/17 1200)  Resp: 18 (06/17/17 1200)  BP: (!) 187/86 (06/17/17 1200)  SpO2: 97 % (06/17/17 1200)  BP Location: Right arm    Vital Signs Range (Last 24H):  Temp:  [97.6 °F (36.4 °C)-99.4 °F (37.4 °C)]   Pulse:  [60-90]   Resp:  [18]   BP: (166-188)/(66-91)   SpO2:  [95 %-97 %]   BP Location: Right arm    Physical Exam   Constitutional: She appears well-developed and well-nourished.   HENT:   Head: Normocephalic and atraumatic.   Eyes: Pupils are equal, round, and reactive to light.       Neurological Exam:   LOC: alert and follows requests  Language: No aphasia  Speech: Dysarthria  Visual Fields (CN II): Full  EOM (CN III, IV, VI): Full/intact  Pupils (CN III, IV, VI): PERRL  Facial Movement (CN VII): left lower facial asymmetry  Tongue (CN XII): to midline  Motor*: Arm Left:  Paretic (2/5), Leg Left:    Spoke with Mrs Levy who is requesting an appointment with Dr Prado for evaluation of a blck scb on the ankle area as soon as possible, appt schedule for Wed 20 at 8:00 am. Patient verbalized understanding and understanding.     Paretic:  4/5, Arm Right:   Normal (5/5), Leg Right:   Normal (5/5)  Cerebellar*: Normal limb  Sensation: intact to light touch, temperature and vibration  Tone: Arm-Left: normal; Leg-Left: normal; Arm-Right: normal; Leg-Right: normal   Extinction to bilateral stimulation    NIH Stroke Scale:    Level of Consciousness: 0 - alert  LOC Questions: 0 - answers both correctly  LOC Commands: 0 - performs both correctly  Best Gaze: 0 - normal  Visual: 0 - no visual loss  Facial Palsy: 1 - minor  Motor Left Arm: 3 - no effort against gravity  Motor Right Arm: 0 - no drift  Motor Left Le - drift  Motor Right Le - no drift  Limb Ataxia: 0 - absent  Sensory: 0 - normal  Best Language: 0 - no aphasia  Dysarthria: 2 - near to unintelligible  Extinction and Inattention: 1 - partial neglect  NIH Stroke Scale Total: 8  [id number] Scale:   Age: 2 - 75 years old or older  CHF History: 1 - yes  HTN History: 1 - yes  Stroke/TIA/Thromboembolism History: 2 - yes  Vascular Disease History: 0 - no  Diabetes Mellitus in History: 0 - no  Female: 1 - yes  [id number] Scale Total: 7      Laboratory:  CMP:     Recent Labs  Lab 17  0507   CALCIUM 9.5   ALBUMIN 3.4*   PROT 6.8      K 3.3*   CO2 22*      BUN 14   CREATININE 0.8   ALKPHOS 88   ALT 19   AST 36   BILITOT 1.2*     BMP:     Recent Labs  Lab 17  0507      K 3.3*      CO2 22*   BUN 14   CREATININE 0.8   CALCIUM 9.5     CBC:     Recent Labs  Lab 17  0507   WBC 12.08   RBC 4.61   HGB 13.9   HCT 41.7      MCV 91   MCH 30.2   MCHC 33.3     Lipid Panel:     Recent Labs  Lab 06/15/17  1942   CHOL 118*   LDLCALC 67.4   HDL 41   TRIG 48     Coagulation:     Recent Labs  Lab 17  0349   INR 1.1   APTT 24.5     Platelet Aggregation Study: No results for input(s): PLTAGG, PLTAGINTERP, PLTAGREGLACO, ADPPLTAGGREG in the last 168 hours.  Hgb A1C:     Recent Labs  Lab 06/15/17  1942   HGBA1C 5.6     TSH:     Recent Labs  Lab  06/15/17  1942   TSH 1.041       Diagnostic Results:  I have personally reviewed:   Findings:     MRI brain WO contrast (6/16/17)  R insular cortex, right corona radiata and right parietal lobe infarct. +cytotoxic cerebral edema               Assessment/Plan:     6/17/17: NG placed under fluoro today    * Embolic stroke involving right carotid artery    89 y/o female with PMHx of A fib, HTN presenting with left hemiparesis. Imaging remarkable for R hemispheric infarcts; embolic pattern. CTA remarkable for 90% stenosis of the right carotid artery and R M2. Etiology of stroke is likely artery to artery - atheroembolic from symptomatic R IC rather than cardio-embolic. Vascular surgery consult deferred as patient does not want surgical management of the R IC even if offered; will medically optimize.     Of note patient has prior history of strokes noted on imaging with a CHADsVASc of 7 => high risk; 1 year stroke risk of 15.7%. Patient and family aware that anticoagulation would be optimal management in the setting of known A fib and prior strokes for secondary stroke prevention but patient chosen to NOT be anticoagulated due to concerns by the family regarding patient's frequent falls.   -Antithrombotics for secondary stroke prevention: Antiplatelets:  Aspirin: 325 mg oral now and daily and Clopidogrel: 75 mg oral daily   -Statins for secondary stroke prevention and hyperlipidemia, if present: Atorvastatin- 40 mg oral daily,   -Aggressive risk factor modification: Hypertension, Carotid Artery disease and CHF   -Rehab Efforts: Physical Therapy, Occupational Therapy and Speech and Language Pathology.   -Diagnostics: Ordered/Pending: none  -VTE Prophylaxis: Heparin 5000 units SQ every 8 hours        Hemiparesis, left    2/2 stroke  PT/OT recommending SNF        Oropharyngeal dysphagia    2/2 stroke  - SLP. Strict NPO  - Issues with NG tube coiling in the stomach on prior attempts at bedside.  NG tube under fluoro placed  today; on imaging; NG tube curling up; above the diaphragm - discussed with radiology resident who placed NG tube in - likely hiatal hernia related and reported ok to use; no concern for incarcerated hernia at this time - will start medications and tube feeds  - continue to monitor for progress        Chronic diastolic heart failure    -Stroke Risk Factor  -grade 2 on most recent ECHO        Enlarged LA (left atrium)    -as previously identified on ECHO        Chronic atrial fibrillation    -Stroke Risk Factor  -patient and family opted to not be on anticoagulation at this time because of concerns of falling        Internal carotid artery stenosis    -High grade stenosis of the right ICA  -previously identified and again seen on imaging  - likely etiology of most recent stroke  -maximum medical management as patient does not want any surgical intervention.        History of CHF (congestive heart failure)    -Stroke Risk Factor  -holding home meds to allow for adequate cerebral perfusion         CAD (coronary artery disease)    -Stroke Risk Factor  -ASA 325mg        Essential hypertension    -Stroke Risk Factor  - 's overnight  -metoprolol discontinued - low dose coreg (home med) started given need for better BP and HR control.   - SBP goal 150-160 in setting of hemodynamically significant stenosis of the R ICA            Day Valdez MD  Comprehensive Stroke Center  Department of Vascular Neurology   [facility]

## 2024-03-18 NOTE — TELEPHONE ENCOUNTER
----- Message from Jagjit Spence sent at 3/18/2024 12:34 PM CDT -----  Regarding: Appt  Contact: [phone]  Pt  Rodrigue  calling to speak with someone in provider office regarding appt on 4/18 at 3:30 pm . Pt would like to be seen sooner.  States pt has an black scab on ankle.  No appt available .Please call  back at  [phone]

## 2024-03-20 ENCOUNTER — OFFICE VISIT (OUTPATIENT)
Dept: PODIATRY | Facility: CLINIC | Age: 64
End: 2024-03-20

## 2024-03-20 ENCOUNTER — TELEPHONE (OUTPATIENT)
Dept: PODIATRY | Facility: CLINIC | Age: 64
End: 2024-03-20

## 2024-03-20 VITALS
SYSTOLIC BLOOD PRESSURE: 121 MMHG | BODY MASS INDEX: 33.76 KG/M2 | HEART RATE: 76 BPM | DIASTOLIC BLOOD PRESSURE: 57 MMHG | WEIGHT: 156.5 LBS | HEIGHT: 57 IN

## 2024-03-20 DIAGNOSIS — E11.65 TYPE 2 DIABETES MELLITUS WITH HYPERGLYCEMIA, WITHOUT LONG-TERM CURRENT USE OF INSULIN: ICD-10-CM

## 2024-03-20 DIAGNOSIS — G57.80 ENTRAPMENT, ILIOINGUINAL NERVE, UNSPECIFIED LATERALITY: Primary | ICD-10-CM

## 2024-03-20 DIAGNOSIS — L40.9 PSORIASIS: ICD-10-CM

## 2024-03-20 PROCEDURE — 99214 OFFICE O/P EST MOD 30 MIN: CPT | Mod: S$PBB,,, | Performed by: PODIATRIST

## 2024-03-20 PROCEDURE — 99999 PR PBB SHADOW E&M-EST. PATIENT-LVL IV: CPT | Mod: PBBFAC,,, | Performed by: PODIATRIST

## 2024-03-20 PROCEDURE — 4010F ACE/ARB THERAPY RXD/TAKEN: CPT | Mod: ,,, | Performed by: PODIATRIST

## 2024-03-20 RX ORDER — GABAPENTIN 100 MG/1
100 CAPSULE ORAL 3 TIMES DAILY
COMMUNITY
Start: 2024-01-27

## 2024-03-20 RX ORDER — SULFAMETHOXAZOLE AND TRIMETHOPRIM 400; 80 MG/1; MG/1
1 TABLET ORAL 2 TIMES DAILY
Qty: 20 TABLET | Refills: 0 | Status: SHIPPED | OUTPATIENT
Start: 2024-03-20 | End: 2024-06-13 | Stop reason: ALTCHOICE

## 2024-04-01 DIAGNOSIS — J01.90 ACUTE VIRAL SINUSITIS: ICD-10-CM

## 2024-04-01 DIAGNOSIS — B97.89 ACUTE VIRAL SINUSITIS: ICD-10-CM

## 2024-04-02 NOTE — TELEPHONE ENCOUNTER
No care due was identified.  [facility] Embedded Care Due Messages. Reference number: [id number].   4/01/2024 7:05:30 PM CDT

## 2024-04-04 RX ORDER — IPRATROPIUM BROMIDE 21 UG/1
SPRAY, METERED NASAL
Qty: 90 ML | Refills: 3 | Status: SHIPPED | OUTPATIENT
Start: 2024-04-04

## 2024-04-04 NOTE — TELEPHONE ENCOUNTER
Refill Routing Note   Medication(s) are not appropriate for processing by [facility] for the following reason(s):        No active prescription written by provider    [facility] action(s):  Defer             Appointments  past 12m or future 3m with PCP    Date Provider   Last Visit   3/15/2024 Corey Hernandez MD   Next Visit   5/10/2024 Corey Hernandez MD   ED visits in past 90 days: 0        Note composed:11:13 AM 04/04/2024

## 2024-04-07 NOTE — PROGRESS NOTES
Subjective:      Patient ID: Shelley Levy is a 63 y.o. female.    Chief Complaint:   Wound Check (Right medial Ankle mild drainage )    Shelley is a 63 y.o. female who presents to the clinic complaining of thick and discolored toenails on both feet. Shelley is inquiring about treatment options.  Addition issues with neuropathy symptoms.  Here for routine diabetic foot evaluation and foot care as well as new issue right medial ankle ulceration.    Review of Systems   Constitutional: Negative for chills, decreased appetite, fever and malaise/fatigue.   HENT:  Negative for congestion, hearing loss, nosebleeds and tinnitus.    Eyes:  Negative for double vision, pain, photophobia and visual disturbance.   Cardiovascular:  Negative for chest pain, claudication, cyanosis and leg swelling.   Respiratory:  Negative for cough, hemoptysis, shortness of breath and wheezing.    Endocrine: Negative for cold intolerance and heat intolerance.   Hematologic/Lymphatic: Negative for adenopathy and bleeding problem.   Skin:  Positive for color change and nail changes. Negative for dry skin, itching and suspicious lesions.   Musculoskeletal:  Positive for arthritis. Negative for joint pain, myalgias and stiffness.   Gastrointestinal:  Negative for abdominal pain, jaundice, nausea and vomiting.   Genitourinary:  Negative for dysuria, frequency and hematuria.   Neurological:  Negative for difficulty with concentration, loss of balance, numbness, paresthesias and sensory change.   Psychiatric/Behavioral:  Negative for altered mental status, hallucinations and suicidal ideas. The patient is not nervous/anxious.    Allergic/Immunologic: Negative for environmental allergies and persistent infections.           Objective:      Physical Exam  Vitals reviewed.   Constitutional:       Appearance: She is well-developed.   HENT:      Head: Normocephalic and atraumatic.   Cardiovascular:      Pulses:           Dorsalis pedis pulses are 2+ on the  right side and 2+ on the left side.        Posterior tibial pulses are 2+ on the right side and 2+ on the left side.   Pulmonary:      Effort: Pulmonary effort is normal.   Musculoskeletal:         General: Normal range of motion.      Comments: Inspection and palpation of the muscles joints and bones of both lower extremities reveal that muscle strength for the anterior lateral and posterior muscle groups and intrinsic muscle groups of the foot are all 5 over 5 symmetrical.  Ankle subtalar midtarsal and digital joint range of motion are within normal limits, nonpainful, without crepitus or effusion.  Patient exhibits a normal angle and base of gait.  Palpation of the tendons reveal no defects.   Skin:     General: Skin is warm and dry.      Capillary Refill: Capillary refill takes 2 to 3 seconds.      Comments: Skin turgor is normal bilaterally.  Skin texture is well hydrated to both lower extremities.    Bilateral hyperkeratotic lesions noted sub 1st MPJ.    Thickened, discolored  toenails 1-5 with subungual debris  Partial-thickness wound/excoriation right medial ankle without deep infection/purulence noted.  No subcutaneous tissue involvement noted.   Neurological:      Mental Status: She is alert and oriented to person, place, and time.      Comments: Sharp dull light touch vibratory proprioceptive sensation are intact bilaterally.  Deep tendon reflexes to patellar and Achilles tendon are symmetrical 2 over 4 bilaterally.  No ankle clonus or Babinski reflexes noted bilaterally.  Coordination is normal to both feet and lower extremities.   Psychiatric:         Behavior: Behavior normal.             Assessment:       Encounter Diagnoses   Name Primary?    Entrapment, ilioinguinal nerve, unspecified laterality Yes    Type 2 diabetes mellitus with hyperglycemia, without long-term current use of insulin     Psoriasis      Independent visualization of imaging was performed.  Results were reviewed in detail with  patient.       Plan:       Shelley was seen today for wound check.    Diagnoses and all orders for this visit:    Entrapment, ilioinguinal nerve, unspecified laterality    Type 2 diabetes mellitus with hyperglycemia, without long-term current use of insulin    Psoriasis    Other orders  -     sulfamethoxazole-trimethoprim 400-80mg (BACTRIM,SEPTRA) 400-80 mg per tablet; Take 1 tablet by mouth 2 (two) times daily.      I counseled the patient on her conditions, their implications and medical management.    The nature of the condition, options for management, as well as potential risks and complications were discussed in detail with patient. Patient was amenable to my recommendations and left my office fully informed and will follow up as instructed or sooner if necessary.      Decision making:  Chronic illnesses discussed in detail, previous records/notes and imaging independently reviewed, prescription drug management performed in addition to lengthy discussion regarding both conservative and surgical treatment options.    The nature of the condition, options for management, as well as potential risks and complications were discussed in detail with patient. Patient was amenable to my recommendations and left my office fully informed and will follow up as instructed or sooner if necessary.      Begin antibiotic therapy.  Wound care discussed in detail.  Shoe inspection. Diabetic Foot Education. Patient reminded of the importance of good nutrition and blood sugar control to help prevent podiatric complications of diabetes. Patient instructed on proper foot hygeine. We discussed wearing proper shoe gear, daily foot inspections and Diabetic foot education in detail.    Return to clinic in 2-4 weeks or sooner if problems arise

## 2024-04-09 ENCOUNTER — OFFICE VISIT (OUTPATIENT)
Dept: PODIATRY | Facility: CLINIC | Age: 64
End: 2024-04-09

## 2024-04-09 VITALS
DIASTOLIC BLOOD PRESSURE: 57 MMHG | HEIGHT: 57 IN | BODY MASS INDEX: 33.76 KG/M2 | SYSTOLIC BLOOD PRESSURE: 121 MMHG | HEART RATE: 76 BPM | WEIGHT: 156.5 LBS

## 2024-04-09 DIAGNOSIS — S81.801A WOUND OF RIGHT LOWER EXTREMITY, INITIAL ENCOUNTER: Primary | ICD-10-CM

## 2024-04-09 PROCEDURE — 4010F ACE/ARB THERAPY RXD/TAKEN: CPT | Mod: ,,, | Performed by: PODIATRIST

## 2024-04-09 PROCEDURE — 87075 CULTR BACTERIA EXCEPT BLOOD: CPT | Performed by: PODIATRIST

## 2024-04-09 PROCEDURE — 87070 CULTURE OTHR SPECIMN AEROBIC: CPT | Performed by: PODIATRIST

## 2024-04-09 PROCEDURE — 99213 OFFICE O/P EST LOW 20 MIN: CPT | Mod: S$PBB,,, | Performed by: PODIATRIST

## 2024-04-09 PROCEDURE — 99999 PR PBB SHADOW E&M-EST. PATIENT-LVL V: CPT | Mod: PBBFAC,,, | Performed by: PODIATRIST

## 2024-04-10 ENCOUNTER — TELEPHONE (OUTPATIENT)
Dept: WOUND CARE | Facility: CLINIC | Age: 64
End: 2024-04-10

## 2024-04-10 NOTE — TELEPHONE ENCOUNTER
Patient returned call regarding wound care referral, however, patient wants to be seen at [facility] with Dr. Zuleta because her  sees him and goes to [facility].  Staff message sent to Carol at [facility] to contact Mrs. Levy for scheduling.

## 2024-04-12 ENCOUNTER — OFFICE VISIT (OUTPATIENT)
Dept: OPHTHALMOLOGY | Facility: CLINIC | Age: 64
End: 2024-04-12

## 2024-04-12 DIAGNOSIS — H35.033 HYPERTENSIVE RETINOPATHY OF BOTH EYES: ICD-10-CM

## 2024-04-12 DIAGNOSIS — E11.3313 MODERATE NONPROLIFERATIVE DIABETIC RETINOPATHY OF BOTH EYES WITH MACULAR EDEMA ASSOCIATED WITH TYPE 2 DIABETES MELLITUS: Primary | ICD-10-CM

## 2024-04-12 DIAGNOSIS — H43.813 VITREOUS DEGENERATION OF BOTH EYES: ICD-10-CM

## 2024-04-12 DIAGNOSIS — H25.13 AGE-RELATED NUCLEAR CATARACT OF BOTH EYES: ICD-10-CM

## 2024-04-12 LAB — BACTERIA SPEC AEROBE CULT: NORMAL

## 2024-04-12 PROCEDURE — 67028 INJECTION EYE DRUG: CPT | Mod: 50,PBBFAC | Performed by: OPHTHALMOLOGY

## 2024-04-12 PROCEDURE — 99214 OFFICE O/P EST MOD 30 MIN: CPT | Mod: 25,S$PBB,, | Performed by: OPHTHALMOLOGY

## 2024-04-12 PROCEDURE — 4010F ACE/ARB THERAPY RXD/TAKEN: CPT | Mod: ,,, | Performed by: OPHTHALMOLOGY

## 2024-04-12 PROCEDURE — 67028 INJECTION EYE DRUG: CPT | Mod: 50,S$PBB,, | Performed by: OPHTHALMOLOGY

## 2024-04-12 PROCEDURE — 92134 CPTRZ OPH DX IMG PST SGM RTA: CPT | Mod: PBBFAC | Performed by: OPHTHALMOLOGY

## 2024-04-12 PROCEDURE — 99999 PR PBB SHADOW E&M-EST. PATIENT-LVL III: CPT | Mod: PBBFAC,,, | Performed by: OPHTHALMOLOGY

## 2024-04-12 PROCEDURE — 99999PBSHW PR PBB SHADOW TECHNICAL ONLY FILED TO HB: Mod: JZ,PBBFAC,,

## 2024-04-12 RX ADMIN — Medication 1.25 MG: at 04:04

## 2024-04-12 NOTE — PROGRESS NOTES
HPI    1m OCT/DFE/YVAN OU    Pt states va is improved OD since last visit. Stable floaters OU.   Itchy/irritation OU. Stable Dry eye    No flashes  Floaters OU  No pain  +dry eye  +irritation  Gtts: Thera Tears BID-QID OU  Last edited by Mai Holland on 4/12/2024  2:32 PM.         A/P    ICD-10-CM ICD-9-CM   1. 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 Vitreous degeneration of both eyes  H43.813 379.21   3. Hypertensive retinopathy of both eyes  H35.033 362.11   4. Age-related nuclear cataract of both eyes  H25.13 366.16           1. Moderate nonproliferative diabetic retinopathy of both eyes with macular edema associated with type 2 diabetes mellitus  2. Diabetic macular edema of both eyes  PCP  Corey Hernandez MD - Recent notes reviewed   02/09/2024  5.5  A1C     OD: s/p NEFTALY 3/8/24  VA 20/25 (was 20/40), mod DR with DME involving fovea improving  Plan: given foveal DME, recommend Avastin OD in better seeing eye     OS: s/p NEFTALY 3/8/24  VA 20/150 (Was 20/400), hx of amblyopia, improving DME today  Plan: recommend Avastin OS to get DME under better control before sending for CEIOL    Based on todays exam, diagnostic studies, and review of records, the determination was made for treatment today.  Schedule Avastin Injection today BOTH Eye Patient chooses to proceed with injection R/B/A discussed include infection retinal detachment and stroke    Patient identified.  Timeout performed.    Risks, benefits, and alternatives to treatment were discussed in detail with the patient, including bleeding/infection (endophthalmitis)/etc.  The patient voiced understanding and wished to proceed with the procedure.  See separate consent form.    Injection Procedure Note:  Diagnosis: DME BOTH Eye    First we did the right eye     Topical Proparacaine drop placed then topical 5% Betadine and then subconjunctival  lidocaine 2% injection  Sterile gloves used, and sterile lid speculum placed.  5% Betadine placed at injection site again prior to injection.  Avastin 1.25mg in 0.05cc Injected inferotemporally 3.5-4mm posterior to the limbus.  Complications: None  Va at least CF at 5 feet post injection.  Retina, ONH, IOP normal after injection.    The same was performed for the left eye     Followup as below.  Patient should return immediately PRN.  Retinal Detachment and Endophthalmitis precautions given.     Recommend good blood pressure control, tight blood glucose control, and good cholesterol control      3. Vitreous degeneration of both eyes  No RT/RD  Plan: Observation      4. Hypertensive retinopathy of both eyes  Mild HTN changes  Plan: Observation  Recommend good blood pressure control, tight blood glucose control, and good cholesterol control     5. Age-related nuclear cataract of both eyes  VS NS  Amblyopia OS  Plan: will wait to refer for CEIOL once we get DME under better control    RTC 1 mo DFE/OCTm OU, poss Avastin OU        I saw and examined the patient and reviewed in detail the findings documented. The final examination findings, image interpretations which have been independently interpreted, and plan as documented in the record represent my personal judgment and conclusions.    Amari Tran MD  Vitreoretinal Surgery   [facility]

## 2024-04-15 LAB — BACTERIA SPEC ANAEROBE CULT: NORMAL

## 2024-04-17 ENCOUNTER — HOSPITAL ENCOUNTER (OUTPATIENT)
Dept: WOUND CARE | Facility: HOSPITAL | Age: 64
Discharge: HOME | End: 2024-04-17
Attending: SURGERY

## 2024-04-17 VITALS
HEART RATE: 80 BPM | BODY MASS INDEX: 33.76 KG/M2 | SYSTOLIC BLOOD PRESSURE: 134 MMHG | HEIGHT: 57 IN | WEIGHT: 156.5 LBS | RESPIRATION RATE: 18 BRPM | TEMPERATURE: 98 F | DIASTOLIC BLOOD PRESSURE: 72 MMHG

## 2024-04-17 DIAGNOSIS — S81.801A WOUND OF RIGHT LOWER EXTREMITY, INITIAL ENCOUNTER: ICD-10-CM

## 2024-04-17 DIAGNOSIS — Z79.4 TYPE 2 DIABETES MELLITUS WITH DIABETIC POLYNEUROPATHY, WITH LONG-TERM CURRENT USE OF INSULIN: ICD-10-CM

## 2024-04-17 DIAGNOSIS — L97.312 ULCER OF RIGHT ANKLE, WITH FAT LAYER EXPOSED: Primary | ICD-10-CM

## 2024-04-17 DIAGNOSIS — E11.42 TYPE 2 DIABETES MELLITUS WITH DIABETIC POLYNEUROPATHY, WITH LONG-TERM CURRENT USE OF INSULIN: ICD-10-CM

## 2024-04-17 PROCEDURE — 11042 DBRDMT SUBQ TIS 1ST 20SQCM/<: CPT

## 2024-04-17 PROCEDURE — 99204 OFFICE O/P NEW MOD 45 MIN: CPT | Mod: 25

## 2024-04-17 PROCEDURE — 87147 CULTURE TYPE IMMUNOLOGIC: CPT | Performed by: SURGERY

## 2024-04-17 PROCEDURE — 87075 CULTR BACTERIA EXCEPT BLOOD: CPT | Performed by: SURGERY

## 2024-04-17 PROCEDURE — 87070 CULTURE OTHR SPECIMN AEROBIC: CPT | Performed by: SURGERY

## 2024-04-17 PROCEDURE — 87186 SC STD MICRODIL/AGAR DIL: CPT | Performed by: SURGERY

## 2024-04-17 PROCEDURE — 87077 CULTURE AEROBIC IDENTIFY: CPT | Performed by: SURGERY

## 2024-04-17 NOTE — PROGRESS NOTES
[facility]    Subjective:       Patient ID: Shelley Levy is a 63 y.o. female.    Chief Complaint: Wound Consult    New referral to wound care clinic for right leg ulcers. Patient with history of diabetes - last HbA1C 5.5, non smoker, reports neuropathy to feet. Reports ulcers developed over a month ago. Recently finished 10 days of Bactrim, recent culture negative.Left leg with slight edema, +2 pulses, skin intact. Right leg with +2 pitting edema, doppler pulses and ulceration to right ankle area and lower leg. Right medial ankle debrided, culture obtained. Will order vascular tests and start santyl to debride. Return to clinic in 1 week.  Review of Systems   Constitutional: Negative.    HENT: Negative.     Eyes: Negative.    Respiratory: Negative.     Cardiovascular: Negative.    Gastrointestinal: Negative.    Genitourinary: Negative.    Musculoskeletal: Negative.    Skin: Negative.    Neurological: Negative.    Psychiatric/Behavioral: Negative.         Objective:     Vitals:    04/17/24 1426   BP: 134/72   Pulse: 80   Resp: 18   Temp: 97.6 °F (36.4 °C)         Physical Exam  Constitutional:       Appearance: She is well-developed.   HENT:      Head: Normocephalic.   Eyes:      Conjunctiva/sclera: Conjunctivae normal.      Pupils: Pupils are equal, round, and reactive to light.   Cardiovascular:      Rate and Rhythm: Normal rate and regular rhythm.      Heart sounds: Normal heart sounds.   Pulmonary:      Effort: Pulmonary effort is normal.      Breath sounds: Normal breath sounds.   Abdominal:      General: Bowel sounds are normal.      Palpations: Abdomen is soft.   Musculoskeletal:         General: Normal range of motion.      Cervical back: Normal range of motion and neck supple.   Skin:     General: Skin is warm and dry.   Neurological:      Mental Status: She is alert and oriented to person, place, and time.      Deep Tendon Reflexes: Reflexes are normal  and symmetric.        Wound 04/17/24 1400 Venous Ulcer Right medial Ankle (Active)   04/17/24 1400   Present on Original Admission: Y   Primary Wound Type: Venous ulcer   Side: Right   Orientation: medial   Location: Ankle   Wound Approximate Age at First Assessment (Weeks):    Wound Number:    Is this injury device related?:    Incision Type:    Closure Method:    Wound Description (Comments):    Type:    Additional Comments:    Ankle-Brachial Index:    Pulses: doppler   Removal Indication and Assessment:    Wound Outcome:    Wound Image   04/17/24 1509   Dressing Appearance Moist drainage 04/17/24 1509   Drainage Amount Moderate 04/17/24 1509   Drainage Characteristics/Odor Serosanguineous 04/17/24 1509   Tissue loss description Full thickness 04/17/24 1509   Red (%), Wound Tissue Color 25 % 04/17/24 1509   Yellow (%), Wound Tissue Color 75 % 04/17/24 1509   Periwound Area Intact;Edematous;Redness 04/17/24 1509   Wound Edges Undefined 04/17/24 1509   Wound Length (cm) 2.6 cm 04/17/24 1509   Wound Width (cm) 5 cm 04/17/24 1509   Wound Depth (cm) 0.1 cm 04/17/24 1509   Wound Volume (cm^3) 1.3 cm^3 04/17/24 1509   Wound Surface Area (cm^2) 13 cm^2 04/17/24 1509   Care Cleansed with:;Sterile normal saline;Other (see comments);Debrided 04/17/24 1509   Dressing Applied;Other (comment);Methylene blue/gentian violet;Foam;Rolled gauze;Tubular bandage 04/17/24 1509   Compression Tubular elasticized bandage 04/17/24 1509   Dressing Change Due 04/19/24 04/17/24 1509            Wound 04/17/24 1400 Venous Ulcer Right lateral Ankle (Active)   04/17/24 1400   Present on Original Admission: Y   Primary Wound Type: Venous ulcer   Side: Right   Orientation: lateral   Location: Ankle   Wound Approximate Age at First Assessment (Weeks):    Wound Number:    Is this injury device related?:    Incision Type:    Closure Method:    Wound Description (Comments):    Type:    Additional Comments:    Ankle-Brachial Index:    Pulses: doppler    Removal Indication and Assessment:    Wound Outcome:    Wound Image   04/17/24 1509   Dressing Appearance Moist drainage 04/17/24 1509   Drainage Amount Moderate 04/17/24 1509   Drainage Characteristics/Odor Serosanguineous 04/17/24 1509   Appearance Eschar;Red 04/17/24 1509   Tissue loss description Full thickness 04/17/24 1509   Black (%), Wound Tissue Color 90 % 04/17/24 1509   Red (%), Wound Tissue Color 10 % 04/17/24 1509   Periwound Area Intact;Edematous 04/17/24 1509   Wound Edges Undefined 04/17/24 1509   Wound Length (cm) 1 cm 04/17/24 1509   Wound Width (cm) 5 cm 04/17/24 1509   Wound Depth (cm) 0.1 cm 04/17/24 1509   Wound Volume (cm^3) 0.5 cm^3 04/17/24 1509   Wound Surface Area (cm^2) 5 cm^2 04/17/24 1509   Care Cleansed with:;Sterile normal saline 04/17/24 1509   Dressing Applied;Other (comment);Methylene blue/gentian violet;Foam;Rolled gauze;Tubular bandage 04/17/24 1509   Periwound Care Absorptive dressing applied 04/17/24 1509   Compression Tubular elasticized bandage 04/17/24 1509   Dressing Change Due 04/19/24 04/17/24 1509            Wound 04/17/24 1509 Venous Ulcer Right anterior;lower Leg (Active)   04/17/24 1509   Present on Original Admission: Y   Primary Wound Type: Venous ulcer   Side: Right   Orientation: anterior;lower   Location: Leg   Wound Approximate Age at First Assessment (Weeks):    Wound Number:    Is this injury device related?:    Incision Type:    Closure Method:    Wound Description (Comments):    Type:    Additional Comments:    Ankle-Brachial Index:    Pulses: doppler   Removal Indication and Assessment:    Wound Outcome:    Wound Image   04/17/24 1509   Dressing Appearance Moist drainage 04/17/24 1509   Drainage Amount Moderate 04/17/24 1509   Drainage Characteristics/Odor Serosanguineous 04/17/24 1509   Appearance Red;Fibrin 04/17/24 1509   Tissue loss description Full thickness 04/17/24 1509   Red (%), Wound Tissue Color 75 % 04/17/24 1509   Yellow (%), Wound Tissue  Color 25 % 04/17/24 1509   Periwound Area Edematous 04/17/24 1509   Wound Edges Undefined 04/17/24 1509   Wound Length (cm) 1 cm 04/17/24 1509   Wound Width (cm) 0.8 cm 04/17/24 1509   Wound Depth (cm) 0.1 cm 04/17/24 1509   Wound Volume (cm^3) 0.08 cm^3 04/17/24 1509   Wound Surface Area (cm^2) 0.8 cm^2 04/17/24 1509   Care Cleansed with:;Sterile normal saline 04/17/24 1509   Dressing Applied;Other (comment);Methylene blue/gentian violet;Foam;Rolled gauze;Tubular bandage 04/17/24 1509   Compression Tubular elasticized bandage 04/17/24 1509   Dressing Change Due 04/19/24 04/17/24 1509         Assessment/Plan:         ICD-10-CM ICD-9-CM   1. Ulcer of right ankle, with fat layer exposed  L97.312 707.13   2. Wound of right lower extremity, initial encounter  S81.801A 894.0           Tissue pathology and/or culture taken:  [x] Yes [] No   Sharp debridement performed:   [x] Yes [] No   Labs ordered this visit:   [] Yes [x] No   Imaging ordered this visit:   [x] Yes [] No           Orders Placed This Encounter   Procedures    CULTURE, AEROBIC  (SPECIFY SOURCE)     Right medial ankle    CULTURE, ANAEROBE     Right medial ankle    US Lower Extremity Arteries Bilateral     Standing Status:   Future     Standing Expiration Date:   4/17/2025     Order Specific Question:   Reason for Exam:     Answer:   r/o arterial insuffiency    US Lower Extremity Veins Bilateral     Standing Status:   Future     Standing Expiration Date:   4/17/2025    Ambulatory referral/consult to Wound Clinic     Standing Status:   Standing     Number of Occurrences:   1     Referral Priority:   Routine     Referral Type:   Consultation     Referral Reason:   Specialty Services Required     Requested Specialty:   Wound Care     Number of Visits Requested:   1    Change dressing     Right medial ankle, right lateral ankle, right anterior lower leg:  Cleanse wound with:saline  Lidocaine:prn  Silver nitrate:prn  Periwound care:maintain dry  Primary  dressing:santyl, gentamicin, hydrofera blue ready  Secondary dressin inch conform rolled gauze  Edema control: 1 layer tubigrip F, limb elevation  Frequency:every other day  Follow-up:1 week   Other Orders:right medial ankle culture, arterial and venous dopplers(scheduled for ), RX: gentamicin, santyl        Follow up in about 1 week (around 2024) for .            This includes face to face time and non-face to face time preparing to see the patient (eg, review of tests), obtaining and/or reviewing separately obtained history, documenting clinical information in the electronic or other health record, independently interpreting results and communicating results to the patient/family/caregiver, or care coordinator.

## 2024-04-18 ENCOUNTER — HOSPITAL ENCOUNTER (OUTPATIENT)
Dept: RADIOLOGY | Facility: HOSPITAL | Age: 64
Discharge: HOME | End: 2024-04-18
Attending: SURGERY
Payer: COMMERCIAL

## 2024-04-18 ENCOUNTER — HOSPITAL ENCOUNTER (OUTPATIENT)
Dept: RADIOLOGY | Facility: HOSPITAL | Age: 64
Discharge: HOME | End: 2024-04-18
Attending: SURGERY

## 2024-04-18 DIAGNOSIS — S81.801A WOUND OF RIGHT LOWER EXTREMITY, INITIAL ENCOUNTER: ICD-10-CM

## 2024-04-18 PROBLEM — L97.312 ULCER OF RIGHT ANKLE, WITH FAT LAYER EXPOSED: Status: ACTIVE | Noted: 2024-04-18

## 2024-04-18 PROCEDURE — 93970 EXTREMITY STUDY: CPT | Mod: 26,,, | Performed by: STUDENT IN AN ORGANIZED HEALTH CARE EDUCATION/TRAINING PROGRAM

## 2024-04-18 PROCEDURE — 93925 LOWER EXTREMITY STUDY: CPT | Mod: TC

## 2024-04-18 PROCEDURE — 93970 EXTREMITY STUDY: CPT | Mod: TC

## 2024-04-18 PROCEDURE — 93925 LOWER EXTREMITY STUDY: CPT | Mod: 26,,, | Performed by: STUDENT IN AN ORGANIZED HEALTH CARE EDUCATION/TRAINING PROGRAM

## 2024-04-18 NOTE — PROCEDURES
"Debridement    Date/Time: 4/17/2024 1:57 PM    Performed by: Narciso Zuleta MD  Authorized by: Narciso Zuleta MD    Time out: Immediately prior to procedure a "time out" was called to verify the correct patient, procedure, equipment, support staff and site/side marked as required.    Consent Done?:  Yes (Verbal)    Preparation: Patient was prepped and draped in usual sterile fashion and Patient was prepped and draped with clean technique    Local anesthesia used?: Yes    Local anesthetic:  Topical anesthetic    Type of Debridement:  Excisional       Length (cm):  2.6       Area (sq cm):  13       Width (cm):  5       Percent Debrided (%):  100       Depth (cm):  0.3       Total Area Debrided (sq cm):  13    Depth of debridement:  Subcutaneous tissue    Tissue debrided:  Subcutaneous    Devitalized tissue debrided:  Exudate, Fibrin, Necrotic/Eschar and Slough    Instruments:  Curette, Forceps and Scissors  Bleeding:  Minimal  Hemostasis Achieved: Yes  Method Used:  Pressure and Silver Nitrate  Patient tolerance:  Patient tolerated the procedure well with no immediate complications  1st Wound Pain Assessment: 3  Specimen Collected: Specimen sent to microbiology  "

## 2024-04-19 ENCOUNTER — PATIENT MESSAGE (OUTPATIENT)
Dept: INTERNAL MEDICINE | Facility: CLINIC | Age: 64
End: 2024-04-19

## 2024-04-20 LAB
BACTERIA SPEC AEROBE CULT: ABNORMAL
BACTERIA SPEC AEROBE CULT: ABNORMAL

## 2024-04-22 LAB — BACTERIA SPEC ANAEROBE CULT: NORMAL

## 2024-04-24 ENCOUNTER — HOSPITAL ENCOUNTER (OUTPATIENT)
Dept: WOUND CARE | Facility: HOSPITAL | Age: 64
Discharge: HOME | End: 2024-04-24
Attending: SURGERY

## 2024-04-24 VITALS
WEIGHT: 156.5 LBS | DIASTOLIC BLOOD PRESSURE: 55 MMHG | TEMPERATURE: 99 F | SYSTOLIC BLOOD PRESSURE: 98 MMHG | HEART RATE: 65 BPM | BODY MASS INDEX: 33.76 KG/M2 | HEIGHT: 57 IN

## 2024-04-24 DIAGNOSIS — L97.312 ULCER OF RIGHT ANKLE, WITH FAT LAYER EXPOSED: ICD-10-CM

## 2024-04-24 DIAGNOSIS — S81.801D WOUND OF RIGHT LOWER EXTREMITY, SUBSEQUENT ENCOUNTER: Primary | ICD-10-CM

## 2024-04-24 PROCEDURE — 99214 OFFICE O/P EST MOD 30 MIN: CPT

## 2024-04-24 NOTE — PROGRESS NOTES
[facility]    Subjective:       Patient ID: Shelley Levy is a 63 y.o. female.    Chief Complaint: Wound Care and Wound Check    4/24/2024  Follow up with Dr Zuleta related to right medial and lateral ankle, right lower leg wounds. No complaints. Wound culture and U/S results reviewed with patient and spouse. Discussed concern for cross contamination and prevention with patient and spouse, they verbalized understanding. Plan of care updated. Rx for bactroban provided to patient.      Review of Systems   Constitutional: Negative.    HENT: Negative.     Eyes: Negative.    Respiratory: Negative.     Cardiovascular: Negative.    Gastrointestinal: Negative.    Genitourinary: Negative.    Musculoskeletal: Negative.    Skin: Negative.    Neurological: Negative.    Psychiatric/Behavioral: Negative.         Objective:     Vitals:    04/24/24 1437   BP: (!) 98/55   Pulse: 65   Temp: 98.5 °F (36.9 °C)         Physical Exam  Constitutional:       Appearance: She is well-developed.   HENT:      Head: Normocephalic.   Eyes:      Conjunctiva/sclera: Conjunctivae normal.      Pupils: Pupils are equal, round, and reactive to light.   Cardiovascular:      Rate and Rhythm: Normal rate and regular rhythm.      Heart sounds: Normal heart sounds.   Pulmonary:      Effort: Pulmonary effort is normal.      Breath sounds: Normal breath sounds.   Abdominal:      General: Bowel sounds are normal.      Palpations: Abdomen is soft.   Musculoskeletal:         General: Normal range of motion.      Cervical back: Normal range of motion and neck supple.   Skin:     General: Skin is warm and dry.   Neurological:      Mental Status: She is alert and oriented to person, place, and time.      Deep Tendon Reflexes: Reflexes are normal and symmetric.        Wound 04/17/24 1400 Venous Ulcer Right medial Ankle (Active)   04/17/24 1400   Present on Original Admission: Y   Primary Wound Type: Venous  ulcer   Side: Right   Orientation: medial   Location: Ankle   Wound Approximate Age at First Assessment (Weeks):    Wound Number:    Is this injury device related?:    Incision Type:    Closure Method:    Wound Description (Comments):    Type:    Additional Comments:    Ankle-Brachial Index:    Pulses: doppler   Removal Indication and Assessment:    Wound Outcome:    Wound Image   04/24/24 1500   Dressing Appearance Moist drainage 04/24/24 1500   Drainage Amount Moderate 04/24/24 1500   Drainage Characteristics/Odor Serosanguineous 04/24/24 1500   Appearance Yellow;Moist;Pink 04/24/24 1500   Tissue loss description Full thickness 04/24/24 1500   Red (%), Wound Tissue Color 10 % 04/24/24 1500   Yellow (%), Wound Tissue Color 90 % 04/24/24 1500   Periwound Area Intact;Edematous;Dry 04/24/24 1500   Wound Edges Undefined 04/24/24 1500   Wound Length (cm) 2.8 cm 04/24/24 1500   Wound Width (cm) 5.1 cm 04/24/24 1500   Wound Depth (cm) 0.1 cm 04/24/24 1500   Wound Volume (cm^3) 1.428 cm^3 04/24/24 1500   Wound Surface Area (cm^2) 14.28 cm^2 04/24/24 1500   Care Cleansed with:;Sterile normal saline 04/24/24 1500   Dressing Applied;Other (comment);Methylene blue/gentian violet;Foam;Rolled gauze;Tubular bandage 04/24/24 1500   Periwound Care Absorptive dressing applied;Moisturizer applied 04/24/24 1500   Compression Tubular elasticized bandage 04/24/24 1500   Dressing Change Due 04/26/24 04/24/24 1500            Wound 04/17/24 1400 Venous Ulcer Right lateral Ankle (Active)   04/17/24 1400   Present on Original Admission: Y   Primary Wound Type: Venous ulcer   Side: Right   Orientation: lateral   Location: Ankle   Wound Approximate Age at First Assessment (Weeks):    Wound Number:    Is this injury device related?:    Incision Type:    Closure Method:    Wound Description (Comments):    Type:    Additional Comments:    Ankle-Brachial Index:    Pulses: doppler   Removal Indication and Assessment:    Wound Outcome:    Wound  Image   04/24/24 1500   Dressing Appearance Moist drainage 04/24/24 1500   Drainage Amount Moderate 04/24/24 1500   Drainage Characteristics/Odor Serosanguineous 04/24/24 1500   Appearance Eschar;Pink;Yellow;Moist 04/24/24 1500   Tissue loss description Full thickness 04/24/24 1500   Black (%), Wound Tissue Color 50 % 04/24/24 1500   Red (%), Wound Tissue Color 30 % 04/24/24 1500   Yellow (%), Wound Tissue Color 20 % 04/24/24 1500   Periwound Area Satellite lesion;Edematous 04/24/24 1500   Wound Edges Undefined 04/24/24 1500   Wound Length (cm) 1.8 cm 04/24/24 1500   Wound Width (cm) 3.1 cm 04/24/24 1500   Wound Depth (cm) 0.1 cm 04/24/24 1500   Wound Volume (cm^3) 0.558 cm^3 04/24/24 1500   Wound Surface Area (cm^2) 5.58 cm^2 04/24/24 1500   Care Cleansed with:;Sterile normal saline 04/24/24 1500   Dressing Applied;Other (comment);Methylene blue/gentian violet;Foam;Rolled gauze;Tubular bandage 04/24/24 1500   Periwound Care Absorptive dressing applied;Moisturizer applied 04/24/24 1500   Compression Tubular elasticized bandage 04/24/24 1500   Dressing Change Due 04/26/24 04/24/24 1500            Wound 04/17/24 1509 Venous Ulcer Right anterior;lower Leg (Active)   04/17/24 1509   Present on Original Admission: Y   Primary Wound Type: Venous ulcer   Side: Right   Orientation: anterior;lower   Location: Leg   Wound Approximate Age at First Assessment (Weeks):    Wound Number:    Is this injury device related?:    Incision Type:    Closure Method:    Wound Description (Comments):    Type:    Additional Comments:    Ankle-Brachial Index:    Pulses: doppler   Removal Indication and Assessment:    Wound Outcome:    Wound Image   04/24/24 1500   Dressing Appearance Moist drainage 04/24/24 1500   Drainage Amount Moderate 04/24/24 1500   Drainage Characteristics/Odor Serosanguineous 04/24/24 1500   Appearance Red;Yellow;Moist 04/24/24 1500   Tissue loss description Full thickness 04/24/24 1500   Red (%), Wound Tissue Color 20  % 24 1500   Yellow (%), Wound Tissue Color 80 % 24   Periwound Area Edematous;Intact 24   Wound Edges Undefined 24   Wound Length (cm) 1.5 cm 24   Wound Width (cm) 0.7 cm 24   Wound Depth (cm) 0.1 cm 24   Wound Volume (cm^3) 0.105 cm^3 24   Wound Surface Area (cm^2) 1.05 cm^2 24   Care Cleansed with:;Sterile normal saline 24   Dressing Applied;Other (comment);Methylene blue/gentian violet;Foam;Rolled gauze;Tubular bandage 24   Periwound Care Absorptive dressing applied;Moisturizer applied 24   Compression Tubular elasticized bandage 24   Dressing Change Due 24         Assessment/Plan:         ICD-10-CM ICD-9-CM   1. Wound of right lower extremity, subsequent encounter  S81.801D V58.89     894.0   2. Ulcer of right ankle, with fat layer exposed  L97.312 707.13           Tissue pathology and/or culture taken:  [] Yes [x] No   Sharp debridement performed:   [] Yes [x] No   Labs ordered this visit:   [] Yes [x] No   Imaging ordered this visit:   [] Yes [x] No           Orders Placed This Encounter   Procedures    Change dressing     Right medial ankle, right lateral ankle, right anterior lower leg:  Cleanse wound with:saline  Lidocaine:prn  Silver nitrate:prn  Periwound care: moisturizer as needed  Primary dressing:santyl, bactroban, gentamicin, hydrofera blue ready  Secondary dressin inch conform rolled gauze  Edema control: 1 layer tubigrip F, limb elevation  Frequency:every other day  Follow-up:1 week   Other Orders: RX: bactroban        Follow up in about 1 week (around 2024) for Dr Zuleta.            This includes face to face time and non-face to face time preparing to see the patient (eg, review of tests), obtaining and/or reviewing separately obtained history, documenting clinical information in the electronic or other health record,  independently interpreting results and communicating results to the patient/family/caregiver, or care coordinator.

## 2024-04-28 NOTE — PROGRESS NOTES
Subjective:      Patient ID: Shelley Levy is a 63 y.o. female.    Chief Complaint:   Follow-up and Wound Check (Right medial ankle with drainage )    Shelley is a 63 y.o. female who presents to the clinic complaining of thick and discolored toenails on both feet. Shelley is inquiring about treatment options.  Addition issues with neuropathy symptoms.  Here for routine diabetic foot evaluation and foot care as well as ongoing issue right medial ankle ulceration.    Review of Systems   Constitutional: Negative for chills, decreased appetite, fever and malaise/fatigue.   HENT:  Negative for congestion, hearing loss, nosebleeds and tinnitus.    Eyes:  Negative for double vision, pain, photophobia and visual disturbance.   Cardiovascular:  Negative for chest pain, claudication, cyanosis and leg swelling.   Respiratory:  Negative for cough, hemoptysis, shortness of breath and wheezing.    Endocrine: Negative for cold intolerance and heat intolerance.   Hematologic/Lymphatic: Negative for adenopathy and bleeding problem.   Skin:  Positive for color change and nail changes. Negative for dry skin, itching and suspicious lesions.   Musculoskeletal:  Positive for arthritis. Negative for joint pain, myalgias and stiffness.   Gastrointestinal:  Negative for abdominal pain, jaundice, nausea and vomiting.   Genitourinary:  Negative for dysuria, frequency and hematuria.   Neurological:  Negative for difficulty with concentration, loss of balance, numbness, paresthesias and sensory change.   Psychiatric/Behavioral:  Negative for altered mental status, hallucinations and suicidal ideas. The patient is not nervous/anxious.    Allergic/Immunologic: Negative for environmental allergies and persistent infections.           Objective:      Physical Exam  Vitals reviewed.   Constitutional:       Appearance: She is well-developed.   HENT:      Head: Normocephalic and atraumatic.   Cardiovascular:      Pulses:           Dorsalis pedis  pulses are 2+ on the right side and 2+ on the left side.        Posterior tibial pulses are 2+ on the right side and 2+ on the left side.   Pulmonary:      Effort: Pulmonary effort is normal.   Musculoskeletal:         General: Normal range of motion.      Comments: Inspection and palpation of the muscles joints and bones of both lower extremities reveal that muscle strength for the anterior lateral and posterior muscle groups and intrinsic muscle groups of the foot are all 5 over 5 symmetrical.  Ankle subtalar midtarsal and digital joint range of motion are within normal limits, nonpainful, without crepitus or effusion.  Patient exhibits a normal angle and base of gait.  Palpation of the tendons reveal no defects.   Skin:     General: Skin is warm and dry.      Capillary Refill: Capillary refill takes 2 to 3 seconds.      Comments: Skin turgor is normal bilaterally.  Skin texture is well hydrated to both lower extremities.    Bilateral hyperkeratotic lesions noted sub 1st MPJ.    Thickened, discolored  toenails 1-5 with subungual debris  Partial-thickness wound/excoriation right medial ankle without deep infection/purulence noted.  No subcutaneous tissue involvement noted.   Neurological:      Mental Status: She is alert and oriented to person, place, and time.      Comments: Sharp dull light touch vibratory proprioceptive sensation are intact bilaterally.  Deep tendon reflexes to patellar and Achilles tendon are symmetrical 2 over 4 bilaterally.  No ankle clonus or Babinski reflexes noted bilaterally.  Coordination is normal to both feet and lower extremities.   Psychiatric:         Behavior: Behavior normal.             Assessment:       Encounter Diagnosis   Name Primary?    Wound of right lower extremity, initial encounter Yes     Independent visualization of imaging was performed.  Results were reviewed in detail with patient.       Plan:       Shelley was seen today for follow-up and wound check.    Diagnoses  and all orders for this visit:    Wound of right lower extremity, initial encounter  -     CULTURE, AEROBIC  (SPECIFY SOURCE)  -     Culture, Anaerobic  -     Ambulatory referral/consult to Wound Clinic; Future      I counseled the patient on her conditions, their implications and medical management.    The nature of the condition, options for management, as well as potential risks and complications were discussed in detail with patient. Patient was amenable to my recommendations and left my office fully informed and will follow up as instructed or sooner if necessary.      Decision making:  Chronic illnesses discussed in detail, previous records/notes and imaging independently reviewed, prescription drug management performed in addition to lengthy discussion regarding both conservative and surgical treatment options.    The nature of the condition, options for management, as well as potential risks and complications were discussed in detail with patient. Patient was amenable to my recommendations and left my office fully informed and will follow up as instructed or sooner if necessary.      Shoe inspection. Diabetic Foot Education. Patient reminded of the importance of good nutrition and blood sugar control to help prevent podiatric complications of diabetes. Patient instructed on proper foot hygeine. We discussed wearing proper shoe gear, daily foot inspections and Diabetic foot education in detail.    Refer to wound clinic.

## 2024-05-01 ENCOUNTER — HOSPITAL ENCOUNTER (OUTPATIENT)
Dept: WOUND CARE | Facility: HOSPITAL | Age: 64
Discharge: HOME | End: 2024-05-01
Attending: SURGERY

## 2024-05-01 VITALS
HEART RATE: 62 BPM | BODY MASS INDEX: 33.76 KG/M2 | HEIGHT: 57 IN | TEMPERATURE: 99 F | DIASTOLIC BLOOD PRESSURE: 58 MMHG | WEIGHT: 156.5 LBS | SYSTOLIC BLOOD PRESSURE: 107 MMHG

## 2024-05-01 DIAGNOSIS — S81.801D WOUND OF RIGHT LOWER EXTREMITY, SUBSEQUENT ENCOUNTER: Primary | ICD-10-CM

## 2024-05-01 DIAGNOSIS — L97.312 ULCER OF RIGHT ANKLE, WITH FAT LAYER EXPOSED: ICD-10-CM

## 2024-05-01 PROCEDURE — 99214 OFFICE O/P EST MOD 30 MIN: CPT

## 2024-05-01 NOTE — PROGRESS NOTES
[facility]    Subjective:       Patient ID: Shelley Levy is a 63 y.o. female.    Chief Complaint: Wound Check and Wound Care    5/1/2024  Follow up with Dr Zuleta related to right medial and lateral ankle, right lower leg wounds. Patient is concerned about her neuropathy, states she has an appointment with a neurologist coming up. Did not receive supplies, gave her phone number for supply company, supplies needed until home health can start. Patient states it is too much to do her dressings herself, help needed. Wound improvement noted to all wounds by MD. Plan of care updated.      Review of Systems   Constitutional: Negative.    HENT: Negative.     Eyes: Negative.    Respiratory: Negative.     Cardiovascular: Negative.    Gastrointestinal: Negative.    Genitourinary: Negative.    Musculoskeletal: Negative.    Skin: Negative.    Neurological: Negative.    Psychiatric/Behavioral: Negative.         Objective:     Vitals:    05/01/24 1519   BP: (!) 107/58   Pulse: 62   Temp: 98.5 °F (36.9 °C)         Physical Exam  Constitutional:       Appearance: She is well-developed.   HENT:      Head: Normocephalic.   Eyes:      Conjunctiva/sclera: Conjunctivae normal.      Pupils: Pupils are equal, round, and reactive to light.   Cardiovascular:      Rate and Rhythm: Normal rate and regular rhythm.      Heart sounds: Normal heart sounds.   Pulmonary:      Effort: Pulmonary effort is normal.      Breath sounds: Normal breath sounds.   Abdominal:      General: Bowel sounds are normal.      Palpations: Abdomen is soft.   Musculoskeletal:         General: Normal range of motion.      Cervical back: Normal range of motion and neck supple.   Skin:     General: Skin is warm and dry.   Neurological:      Mental Status: She is alert and oriented to person, place, and time.      Deep Tendon Reflexes: Reflexes are normal and symmetric.        Wound 04/17/24 1400 Venous Ulcer Right  medial Ankle (Active)   04/17/24 1400   Present on Original Admission: Y   Primary Wound Type: Venous ulcer   Side: Right   Orientation: medial   Location: Ankle   Wound Approximate Age at First Assessment (Weeks):    Wound Number:    Is this injury device related?:    Incision Type:    Closure Method:    Wound Description (Comments):    Type:    Additional Comments:    Ankle-Brachial Index:    Pulses: doppler   Removal Indication and Assessment:    Wound Outcome:    Wound Image   05/01/24 1500   Dressing Appearance Moist drainage 05/01/24 1500   Drainage Amount Moderate 05/01/24 1500   Drainage Characteristics/Odor Serous 05/01/24 1500   Appearance Pink;Yellow;Moist 05/01/24 1500   Tissue loss description Full thickness 05/01/24 1500   Red (%), Wound Tissue Color 30 % 05/01/24 1500   Yellow (%), Wound Tissue Color 70 % 05/01/24 1500   Periwound Area Edematous;Intact 05/01/24 1500   Wound Edges Irregular 05/01/24 1500   Wound Length (cm) 2.8 cm 05/01/24 1500   Wound Width (cm) 4.8 cm 05/01/24 1500   Wound Depth (cm) 0.1 cm 05/01/24 1500   Wound Volume (cm^3) 1.344 cm^3 05/01/24 1500   Wound Surface Area (cm^2) 13.44 cm^2 05/01/24 1500   Care Cleansed with:;Sterile normal saline 05/01/24 1500   Dressing Applied;Other (comment);Methylene blue/gentian violet;Foam;Rolled gauze;Tubular bandage 05/01/24 1500   Periwound Care Absorptive dressing applied;Moisturizer applied 05/01/24 1500   Compression Tubular elasticized bandage 05/01/24 1500   Dressing Change Due 05/03/24 05/01/24 1500            Wound 04/17/24 1400 Venous Ulcer Right lateral Ankle (Active)   04/17/24 1400   Present on Original Admission: Y   Primary Wound Type: Venous ulcer   Side: Right   Orientation: lateral   Location: Ankle   Wound Approximate Age at First Assessment (Weeks):    Wound Number:    Is this injury device related?:    Incision Type:    Closure Method:    Wound Description (Comments):    Type:    Additional Comments:    Ankle-Brachial  Index:    Pulses: doppler   Removal Indication and Assessment:    Wound Outcome:    Wound Image   05/01/24 1500   Dressing Appearance Moist drainage 05/01/24 1500   Drainage Amount Moderate 05/01/24 1500   Drainage Characteristics/Odor Serous 05/01/24 1500   Appearance Eschar;Yellow;Moist 05/01/24 1500   Tissue loss description Full thickness 05/01/24 1500   Black (%), Wound Tissue Color 50 % 05/01/24 1500   Yellow (%), Wound Tissue Color 50 % 05/01/24 1500   Periwound Area Satellite lesion;Edematous;Dry;Intact 05/01/24 1500   Wound Edges Irregular 05/01/24 1500   Wound Length (cm) 1.1 cm 05/01/24 1500   Wound Width (cm) 2.9 cm 05/01/24 1500   Wound Depth (cm) 0.1 cm 05/01/24 1500   Wound Volume (cm^3) 0.319 cm^3 05/01/24 1500   Wound Surface Area (cm^2) 3.19 cm^2 05/01/24 1500   Care Cleansed with:;Sterile normal saline 05/01/24 1500   Dressing Applied;Other (comment);Methylene blue/gentian violet;Foam;Rolled gauze;Tubular bandage 05/01/24 1500   Periwound Care Absorptive dressing applied;Moisturizer applied 05/01/24 1500   Compression Tubular elasticized bandage 05/01/24 1500   Dressing Change Due 05/03/24 05/01/24 1500            Wound 04/17/24 1509 Venous Ulcer Right anterior;lower Leg (Active)   04/17/24 1509   Present on Original Admission: Y   Primary Wound Type: Venous ulcer   Side: Right   Orientation: anterior;lower   Location: Leg   Wound Approximate Age at First Assessment (Weeks):    Wound Number:    Is this injury device related?:    Incision Type:    Closure Method:    Wound Description (Comments):    Type:    Additional Comments:    Ankle-Brachial Index:    Pulses: doppler   Removal Indication and Assessment:    Wound Outcome:    Wound Image   05/01/24 1500   Dressing Appearance Moist drainage 05/01/24 1500   Drainage Amount Moderate 05/01/24 1500   Drainage Characteristics/Odor Serous 05/01/24 1500   Appearance Yellow;Fibrin;Dry 05/01/24 1500   Tissue loss description Full thickness 05/01/24 1500    Yellow (%), Wound Tissue Color 100 % 24   Periwound Area Edematous;Intact;Dry 24   Wound Edges Irregular 24   Wound Length (cm) 0.7 cm 24   Wound Width (cm) 0.6 cm 24   Wound Depth (cm) 0.1 cm 24   Wound Volume (cm^3) 0.042 cm^3 24 1500   Wound Surface Area (cm^2) 0.42 cm^2 24 1500   Care Cleansed with:;Sterile normal saline 24 1500   Dressing Applied;Other (comment);Methylene blue/gentian violet;Foam;Rolled gauze;Tubular bandage 24   Periwound Care Absorptive dressing applied;Moisturizer applied 24   Compression Tubular elasticized bandage 24   Dressing Change Due 24         Assessment/Plan:         ICD-10-CM ICD-9-CM   1. Wound of right lower extremity, subsequent encounter  S81.801D V58.89     894.0   2. Ulcer of right ankle, with fat layer exposed  L97.312 707.13           Tissue pathology and/or culture taken:  [] Yes [x] No   Sharp debridement performed:   [] Yes [x] No   Labs ordered this visit:   [] Yes [x] No   Imaging ordered this visit:   [] Yes [x] No           Orders Placed This Encounter   Procedures    Change dressing     Right medial ankle, right lateral ankle, right anterior lower leg:  Cleanse wound with: normal saline  Lidocaine:prn  Silver nitrate:prn  Periwound care: moisturizer as needed  Primary dressing:santyl, bactroban, gentamicin, hydrofera blue ready  Secondary dressin inch conform rolled gauze  Edema control: 1 layer tubigrip F, limb elevation  Frequency:every other day  Follow-up:1 week   Home Health: Admit patient to home health and continue with wound care as above three times weekly and as needed unless the patient is in clinic. Thank you.        Follow up in 1 week (on 2024) for Dr Zuleta.            This includes face to face time and non-face to face time preparing to see the patient (eg, review of tests), obtaining and/or  reviewing separately obtained history, documenting clinical information in the electronic or other health record, independently interpreting results and communicating results to the patient/family/caregiver, or care coordinator.

## 2024-05-08 ENCOUNTER — HOSPITAL ENCOUNTER (OUTPATIENT)
Dept: WOUND CARE | Facility: HOSPITAL | Age: 64
Discharge: HOME | End: 2024-05-08
Attending: SURGERY

## 2024-05-08 VITALS
BODY MASS INDEX: 33.76 KG/M2 | SYSTOLIC BLOOD PRESSURE: 119 MMHG | WEIGHT: 156.5 LBS | HEART RATE: 67 BPM | HEIGHT: 57 IN | DIASTOLIC BLOOD PRESSURE: 57 MMHG | TEMPERATURE: 99 F

## 2024-05-08 DIAGNOSIS — L97.312 ULCER OF RIGHT ANKLE, WITH FAT LAYER EXPOSED: ICD-10-CM

## 2024-05-08 DIAGNOSIS — S81.801D WOUND OF RIGHT LOWER EXTREMITY, SUBSEQUENT ENCOUNTER: Primary | ICD-10-CM

## 2024-05-08 DIAGNOSIS — E11.41 TYPE 2 DIABETES MELLITUS WITH DIABETIC MONONEUROPATHY, WITH LONG-TERM CURRENT USE OF INSULIN: ICD-10-CM

## 2024-05-08 DIAGNOSIS — S81.801A WOUND OF RIGHT LOWER EXTREMITY, INITIAL ENCOUNTER: ICD-10-CM

## 2024-05-08 DIAGNOSIS — Z79.4 TYPE 2 DIABETES MELLITUS WITH DIABETIC MONONEUROPATHY, WITH LONG-TERM CURRENT USE OF INSULIN: ICD-10-CM

## 2024-05-08 PROCEDURE — 11042 DBRDMT SUBQ TIS 1ST 20SQCM/<: CPT

## 2024-05-08 NOTE — PROGRESS NOTES
[facility]    Subjective:       Patient ID: Shelley Levy is a 63 y.o. female.    Chief Complaint: Wound Care and Wound Check    5/8/2024  Follow up with Dr Zuleta related to right medial ankle, right lateral ankle and right lower leg wounds. Unable to get home health or supplies, patient does not currently have insurance. Patient agreed to call her insurance to find out what was wrong, financial aid information provided. Right lower leg wound has closed. Patient tolerated sharp debridement to right medial and right lateral ankle wounds well. Continue plan of care.      Review of Systems   Constitutional: Negative.    HENT: Negative.     Eyes: Negative.    Respiratory: Negative.     Cardiovascular: Negative.    Gastrointestinal: Negative.    Genitourinary: Negative.    Musculoskeletal: Negative.    Skin: Negative.    Neurological: Negative.    Psychiatric/Behavioral: Negative.         Objective:     Vitals:    05/08/24 1449   BP: (!) 119/57   Pulse: 67   Temp: 99 °F (37.2 °C)         Physical Exam  Constitutional:       Appearance: She is well-developed.   HENT:      Head: Normocephalic.   Eyes:      Conjunctiva/sclera: Conjunctivae normal.      Pupils: Pupils are equal, round, and reactive to light.   Cardiovascular:      Rate and Rhythm: Normal rate and regular rhythm.      Heart sounds: Normal heart sounds.   Pulmonary:      Effort: Pulmonary effort is normal.      Breath sounds: Normal breath sounds.   Abdominal:      General: Bowel sounds are normal.      Palpations: Abdomen is soft.   Musculoskeletal:         General: Normal range of motion.      Cervical back: Normal range of motion and neck supple.   Skin:     General: Skin is warm and dry.   Neurological:      Mental Status: She is alert and oriented to person, place, and time.      Deep Tendon Reflexes: Reflexes are normal and symmetric.        Wound 04/17/24 1400 Venous Ulcer Right medial Ankle  (Active)   04/17/24 1400   Present on Original Admission: Y   Primary Wound Type: Venous ulcer   Side: Right   Orientation: medial   Location: Ankle   Wound Approximate Age at First Assessment (Weeks):    Wound Number:    Is this injury device related?:    Incision Type:    Closure Method:    Wound Description (Comments):    Type:    Additional Comments:    Ankle-Brachial Index:    Pulses: doppler   Removal Indication and Assessment:    Wound Outcome:    Wound Image   05/08/24 1500   Dressing Appearance Moist drainage 05/08/24 1500   Drainage Amount Moderate 05/08/24 1500   Drainage Characteristics/Odor Serous 05/08/24 1500   Appearance Red;Yellow;Pink;Moist 05/08/24 1500   Tissue loss description Full thickness 05/08/24 1500   Red (%), Wound Tissue Color 60 % 05/08/24 1500   Yellow (%), Wound Tissue Color 40 % 05/08/24 1500   Periwound Area Moist 05/08/24 1500   Wound Edges Irregular 05/08/24 1500   Wound Length (cm) 2.5 cm 05/08/24 1500   Wound Width (cm) 4.8 cm 05/08/24 1500   Wound Depth (cm) 0.1 cm 05/08/24 1500   Wound Volume (cm^3) 1.2 cm^3 05/08/24 1500   Wound Surface Area (cm^2) 12 cm^2 05/08/24 1500   Care Cleansed with:;Sterile normal saline;Debrided 05/08/24 1500   Dressing Applied;Methylene blue/gentian violet;Hydrofiber;Island/border;Tubular bandage 05/08/24 1500   Periwound Care Absorptive dressing applied 05/08/24 1500   Compression Tubular elasticized bandage 05/08/24 1500   Dressing Change Due 05/10/24 05/08/24 1500            Wound 04/17/24 1400 Venous Ulcer Right lateral Ankle (Active)   04/17/24 1400   Present on Original Admission: Y   Primary Wound Type: Venous ulcer   Side: Right   Orientation: lateral   Location: Ankle   Wound Approximate Age at First Assessment (Weeks):    Wound Number:    Is this injury device related?:    Incision Type:    Closure Method:    Wound Description (Comments):    Type:    Additional Comments:    Ankle-Brachial Index:    Pulses: doppler   Removal Indication and  Assessment:    Wound Outcome:    Wound Image   05/08/24 1500   Dressing Appearance Moist drainage 05/08/24 1500   Drainage Amount Moderate 05/08/24 1500   Drainage Characteristics/Odor Serous 05/08/24 1500   Appearance Black;Dry;Yellow;Moist 05/08/24 1500   Tissue loss description Full thickness 05/08/24 1500   Black (%), Wound Tissue Color 70 % 05/08/24 1500   Yellow (%), Wound Tissue Color 30 % 05/08/24 1500   Periwound Area Satellite lesion 05/08/24 1500   Wound Edges Irregular 05/08/24 1500   Wound Length (cm) 1.1 cm 05/08/24 1500   Wound Width (cm) 3 cm 05/08/24 1500   Wound Depth (cm) 0.1 cm 05/08/24 1500   Wound Volume (cm^3) 0.33 cm^3 05/08/24 1500   Wound Surface Area (cm^2) 3.3 cm^2 05/08/24 1500   Care Cleansed with:;Sterile normal saline;Debrided 05/08/24 1500   Dressing Applied;Methylene blue/gentian violet;Hydrofiber;Island/border;Tubular bandage 05/08/24 1500   Periwound Care Absorptive dressing applied 05/08/24 1500   Compression Tubular elasticized bandage 05/08/24 1500   Dressing Change Due 05/10/24 05/08/24 1500            Wound 04/17/24 1509 Venous Ulcer Right anterior;lower Leg (Active)   04/17/24 1509   Present on Original Admission: Y   Primary Wound Type: Venous ulcer   Side: Right   Orientation: anterior;lower   Location: Leg   Wound Approximate Age at First Assessment (Weeks):    Wound Number:    Is this injury device related?:    Incision Type:    Closure Method:    Wound Description (Comments):    Type:    Additional Comments:    Ankle-Brachial Index:    Pulses: doppler   Removal Indication and Assessment:    Wound Outcome:    Wound Image   05/08/24 1500   Dressing Appearance Clean;Dry;Intact 05/08/24 1500   Drainage Amount None 05/08/24 1500   Appearance Closed/resurfaced 05/08/24 1500   Tissue loss description Not applicable 05/08/24 1500   Periwound Area Intact;Dry 05/08/24 1500   Wound Edges Rolled/closed 05/08/24 1500   Wound Length (cm) 0 cm 05/08/24 1500   Wound Width (cm) 0 cm  05/08/24 1500   Wound Depth (cm) 0 cm 05/08/24 1500   Wound Volume (cm^3) 0 cm^3 05/08/24 1500   Wound Surface Area (cm^2) 0 cm^2 05/08/24 1500   Care Cleansed with:;Sterile normal saline 05/08/24 1500         Assessment/Plan:         ICD-10-CM ICD-9-CM   1. Wound of right lower extremity, subsequent encounter  S81.801D V58.89     894.0   2. Ulcer of right ankle, with fat layer exposed  L97.312 707.13   3. Wound of right lower extremity, initial encounter  S81.801A 894.0           Tissue pathology and/or culture taken:  [] Yes [x] No   Sharp debridement performed:   [x] Yes [] No   Labs ordered this visit:   [] Yes [x] No   Imaging ordered this visit:   [] Yes [x] No           Orders Placed This Encounter   Procedures    Change dressing     Right medial ankle, right lateral ankle wounds:  Cleanse wound with: normal saline  Lidocaine:prn  Silver nitrate:prn  Periwound care: moisturizer as needed  Primary dressing:santyl, bactroban, gentamicin, hydrofera blue ready  Secondary dressing: border dressing  Edema control: 1 layer tubigrip F, limb elevation  Frequency:every other day and as needed  Follow-up:1 week   Home Health: Admit patient to home health and continue with wound care as above three times weekly and as needed unless the patient is in clinic. Thank you.        Follow up in about 1 week (around 5/15/2024) for Dr Zuleta.            This includes face to face time and non-face to face time preparing to see the patient (eg, review of tests), obtaining and/or reviewing separately obtained history, documenting clinical information in the electronic or other health record, independently interpreting results and communicating results to the patient/family/caregiver, or care coordinator.

## 2024-05-09 ENCOUNTER — TELEPHONE (OUTPATIENT)
Dept: WOUND CARE | Facility: HOSPITAL | Age: 64
End: 2024-05-09

## 2024-05-09 PROBLEM — S81.801A WOUND OF RIGHT LEG: Status: ACTIVE | Noted: 2024-05-09

## 2024-05-09 NOTE — TELEPHONE ENCOUNTER
Received a phone call from [facility]. They planned on accepting patient for home health; however, patient's insurance has termed. Patient is aware and is to call her insurance provider. Will resend referral to [facility] (formerly [facility]] once patient's insurance is active again.

## 2024-05-09 NOTE — PROCEDURES
"Debridement    Date/Time: 5/8/2024 2:06 PM    Performed by: Narciso Zuleta MD  Authorized by: Narciso Zuleta MD    Time out: Immediately prior to procedure a "time out" was called to verify the correct patient, procedure, equipment, support staff and site/side marked as required.    Consent Done?:  Yes (Verbal)    Preparation: Patient was prepped and draped in usual sterile fashion and Patient was prepped and draped with clean technique    Local anesthesia used?: Yes    Local anesthetic:  Topical anesthetic    Wound Details:    Location:  Right foot    Location:  Right Ankle    Type of Debridement:  Excisional       Length (cm):  4.8       Area (sq cm):  12       Width (cm):  2.5       Percent Debrided (%):  100       Depth (cm):  0.3       Total Area Debrided (sq cm):  12    Depth of debridement:  Subcutaneous tissue    Tissue debrided:  Subcutaneous    Devitalized tissue debrided:  Slough, Necrotic/Eschar, Fibrin and Exudate    Instruments:  Curette and Scissors  Bleeding:  Minimal  Hemostasis Achieved: Yes  Method Used:  Pressure and Silver Nitrate    2nd Wound Details:     Location:  Right leg    Type of Debridement:  Excisional       Length (cm):  1.5       Area (sq cm):  3.75       Width (cm):  2.5       Percent Debrided (%):  100       Depth (cm):  0.3       Total Area Debrided (sq cm):  3.75    Depth of debridement:  Subcutaneous tissue    Tissue debrided:  Subcutaneous    Devitalized tissue debrided:  Fibrin, Necrotic/Eschar and Slough    Instruments:  Curette and Scissors  Bleeding:  Minimal  Hemostasis Achieved: Yes    Method Used:  Pressure and Silver Nitrate  Patient tolerance:  Patient tolerated the procedure well with no immediate complications  1st Wound Pain Assessment: 2  "

## 2024-05-10 ENCOUNTER — OFFICE VISIT (OUTPATIENT)
Dept: INTERNAL MEDICINE | Facility: CLINIC | Age: 64
End: 2024-05-10

## 2024-05-10 VITALS
OXYGEN SATURATION: 95 % | BODY MASS INDEX: 33.96 KG/M2 | SYSTOLIC BLOOD PRESSURE: 126 MMHG | DIASTOLIC BLOOD PRESSURE: 72 MMHG | WEIGHT: 157.44 LBS | HEART RATE: 72 BPM | HEIGHT: 57 IN

## 2024-05-10 DIAGNOSIS — L97.312 ULCER OF RIGHT ANKLE, WITH FAT LAYER EXPOSED: ICD-10-CM

## 2024-05-10 DIAGNOSIS — H35.033 HYPERTENSIVE RETINOPATHY OF BOTH EYES: ICD-10-CM

## 2024-05-10 DIAGNOSIS — E11.65 TYPE 2 DIABETES MELLITUS WITH HYPERGLYCEMIA, WITHOUT LONG-TERM CURRENT USE OF INSULIN: Primary | ICD-10-CM

## 2024-05-10 DIAGNOSIS — E78.5 DYSLIPIDEMIA: ICD-10-CM

## 2024-05-10 DIAGNOSIS — S81.801D WOUND OF RIGHT LOWER EXTREMITY, SUBSEQUENT ENCOUNTER: ICD-10-CM

## 2024-05-10 PROCEDURE — 99214 OFFICE O/P EST MOD 30 MIN: CPT | Mod: S$PBB,,, | Performed by: INTERNAL MEDICINE

## 2024-05-10 PROCEDURE — 99215 OFFICE O/P EST HI 40 MIN: CPT | Mod: PBBFAC | Performed by: INTERNAL MEDICINE

## 2024-05-10 PROCEDURE — 99999 PR PBB SHADOW E&M-EST. PATIENT-LVL V: CPT | Mod: PBBFAC,,, | Performed by: INTERNAL MEDICINE

## 2024-05-10 NOTE — PROGRESS NOTES
Subjective:       Patient ID: Shelley Levy is a 63 y.o. female.    Chief Complaint: Follow-up and Foot Pain      HPI  Shelley Levy is a 63 y.o. year old female established patient with type 2 diabetes (last A1c 5.5), diabetic polyneuropathy, diabetic retinopathy presents for follow-up.  The patient has been going to wound care for ulcerations on bilateral lower extremities which are improving with wound care.    Review of Systems   Constitutional:  Negative for activity change, appetite change, fatigue, fever and unexpected weight change.   HENT:  Negative for congestion, hearing loss, postnasal drip, sneezing, sore throat, trouble swallowing and voice change.    Eyes:  Negative for pain and discharge.   Respiratory:  Negative for cough, choking, chest tightness, shortness of breath and wheezing.    Cardiovascular:  Negative for chest pain, palpitations and leg swelling.   Gastrointestinal:  Negative for abdominal distention, abdominal pain, blood in stool, constipation, diarrhea, nausea and vomiting.   Endocrine: Negative for polydipsia and polyuria.   Genitourinary:  Negative for difficulty urinating and flank pain.   Musculoskeletal:  Negative for arthralgias, back pain, joint swelling, myalgias and neck pain.   Skin:  Negative for rash.   Neurological:  Positive for numbness (at baseline). Negative for dizziness, tremors, seizures, weakness and headaches.   Psychiatric/Behavioral:  Negative for agitation. The patient is not nervous/anxious.          Past Medical History:   Diagnosis Date    Type 2 diabetes mellitus with neurologic complication 9/5/2023        Prior to Admission medications    Medication Sig Start Date End Date Taking? Authorizing Provider   amitriptyline (ELAVIL) 10 MG tablet Take 1 tablet (10 mg total) by mouth nightly as needed for Insomnia. 2/22/24 2/21/25 Yes Yossi Prado DPM   blood sugar diagnostic Strp To check BG 4 times daily, to use with insurance preferred meter  7/25/23  Yes Corey Hernandez MD   blood-glucose meter kit To check BG 4 times daily, to use with insurance preferred meter 7/25/23 7/24/24 Yes Corey Hernandez MD   calcipotriene (DOVONOX) 0.005 % cream Apply topically 2 (two) times daily. To psoriasis 11/1/23 10/31/24 Yes Ruth Moody MD   clobetasoL (TEMOVATE) 0.05 % cream Apply topically 2 (two) times daily. Use two weeks then take a one week break. Repeat in 2 week intervals. 9/5/23  Yes Joyce Adamson MD   collagenase (SANTYL) ointment Apply topically every other day. 4/17/24  Yes    cyanocobalamin (VITAMIN B-12) 1000 MCG tablet Take 1 tablet (1,000 mcg total) by mouth once daily. 8/29/23  Yes Corey Hernandez MD   gabapentin (NEURONTIN) 100 MG capsule Take 100 mg by mouth 3 (three) times daily. 1/27/24  Yes José Antonio Dee   gabapentin (NEURONTIN) 300 MG capsule Take 2 capsules (600 mg total) by mouth 3 (three) times daily. 2/9/24 2/8/25 Yes Corey Hernandez MD   gentamicin (GARAMYCIN) 0.1 % ointment Apply topically every other day. 4/17/24  Yes    insulin detemir U-100, Levemir, (LEVEMIR FLEXPEN) 100 unit/mL (3 mL) InPn pen Inject 10 Units into the skin every evening. 7/25/23 3/16/25 Yes Corey Hernandez MD   ipratropium (ATROVENT) 21 mcg (0.03 %) nasal spray INSTILL 2 SPRAYS INTO EACH NOSTRIL 3 TIMES A DAY 4/4/24  Yes Corey Hernandez MD   ketoconazole (NIZORAL) 2 % cream Apply topically 2 (two) times daily. To rash under breasts and on back 7/11/23  Yes Ruth Moody MD   lancets Misc To check BG 4 times daily, to use with insurance preferred meter 9/25/23  Yes Corey Hernandez MD   LIDOcaine-prilocaine (EMLA) cream Apply topically as needed. 2/22/24  Yes Yossi Prado DPM   losartan (COZAAR) 25 MG tablet Take 0.5 tablets (12.5 mg total) by mouth once daily. 10/23/23 10/22/24 Yes Corey Hernandez MD   metFORMIN (GLUCOPHAGE-XR) 500 MG ER 24hr tablet Take 2 tablets (1,000 mg total) by mouth 2 (two) times daily with meals. 7/25/23 7/24/24 Yes Corey Hernandez MD   OTEZLA 30 mg Tab  "Take 1 po bid 11/1/23  Yes Ruth Moody MD   pen needle, diabetic 32 gauge x 5/32" Ndle 1 Stick by Misc.(Non-Drug; Combo Route) route once daily. 7/25/23  Yes Corey Hernandez MD   pravastatin (PRAVACHOL) 20 MG tablet Take 1 tablet (20 mg total) by mouth once daily. 11/27/23 11/26/24 Yes Corey Hernandez MD   sulfamethoxazole-trimethoprim 400-80mg (BACTRIM,SEPTRA) 400-80 mg per tablet Take 1 tablet by mouth 2 (two) times daily. 3/20/24  Yes Yossi Prado DPM   terbinafine HCL (LAMISIL) 250 mg tablet TAKE 1 TABLET BY MOUTH EVERY DAY 11/23/23  Yes Yossi Prado DPM   TRUEPLUS LANCETS 33 gauge Misc Apply topically 4 (four) times daily. 7/25/23  Yes Provider, José Antonio   amoxicillin-clavulanate 875-125mg (AUGMENTIN) 875-125 mg per tablet Take 1 tablet by mouth every 12 (twelve) hours.  Patient not taking: Reported on 5/10/2024 4/20/24   Narciso Zuleta MD   clobetasoL (TEMOVATE) 0.05 % external solution Apply topically 2 (two) times daily. Use two weeks then take a one week break. Repeat in 2 week intervals.  Patient not taking: Reported on 5/10/2024 9/5/23   Joyce Adamson MD   furosemide (LASIX) 20 MG tablet TAKE 1 TABLET (20 MG TOTAL) BY MOUTH ONCE DAILY FOR 20 DAYS 11/27/23 12/17/23  Corey Hernandez MD   ketoconazole (NIZORAL) 2 % shampoo Apply topically once daily. Use in shower on rash, then rinse.  Patient not taking: Reported on 5/10/2024 7/11/23   Ruth Moody MD        Past medical history, surgical history, and family medical history reviewed and updated as appropriate.    Medications and allergies reviewed.     Objective:          Vitals:    05/10/24 1359   BP: 126/72   BP Location: Right arm   Patient Position: Sitting   Pulse: 72   SpO2: 95%   Weight: 71.4 kg (157 lb 6.5 oz)   Height: 4' 9" (1.448 m)     Body mass index is 34.06 kg/m².  Physical Exam  Constitutional:       Appearance: She is well-developed.   HENT:      Head: Normocephalic and atraumatic.   Eyes:      Extraocular Movements: " Extraocular movements intact.   Cardiovascular:      Rate and Rhythm: Normal rate and regular rhythm.      Heart sounds: Normal heart sounds.   Pulmonary:      Effort: Pulmonary effort is normal. No respiratory distress.      Breath sounds: Normal breath sounds. No wheezing.   Abdominal:      General: Bowel sounds are normal. There is no distension.      Palpations: Abdomen is soft.      Tenderness: There is no abdominal tenderness.   Musculoskeletal:         General: No tenderness. Normal range of motion.      Cervical back: Normal range of motion.   Skin:     General: Skin is warm and dry.   Neurological:      Mental Status: She is alert and oriented to person, place, and time.      Cranial Nerves: No cranial nerve deficit.      Deep Tendon Reflexes: Reflexes are normal and symmetric.         Lab Results   Component Value Date    WBC 7.37 10/23/2023    HGB 12.0 10/23/2023    HCT 35.7 (L) 10/23/2023     10/23/2023    CHOL 191 07/12/2023    TRIG 225 (H) 07/12/2023    HDL 32 (L) 07/12/2023    ALT 48 (H) 10/23/2023    AST 28 10/23/2023     02/09/2024    K 4.3 02/09/2024     02/09/2024    CREATININE 0.8 02/09/2024    BUN 30 (H) 02/09/2024    CO2 27 02/09/2024    HGBA1C 5.5 02/09/2024       Assessment:       1. Type 2 diabetes mellitus with hyperglycemia, without long-term current use of insulin    2. Hypertensive retinopathy of both eyes    3. Dyslipidemia    4. Ulcer of right ankle, with fat layer exposed    5. Wound of right lower extremity, subsequent encounter          Plan:     Shelley was seen today for follow-up and foot pain.    Diagnoses and all orders for this visit:    Type 2 diabetes mellitus with hyperglycemia, without long-term current use of insulin    Hypertensive retinopathy of both eyes    Dyslipidemia    Ulcer of right ankle, with fat layer exposed    Wound of right lower extremity, subsequent encounter    Patient did lose health insurance for unclear reasons.  Patient has price for  today.  Patient knows that she is able to contact us if are any needs.  Patient does complain of insomnia in worsening neuropathy, however she did get prescribed amitriptyline which did help her symptoms and she was able to sleep.  Patient instructed to half dose as the  full tablet would make her feel very drowsy next morning.    No changes to current regimen for her diabetes as her A1c is well-controlled and she has not experiencing any hypoglycemic episodes.    Patient to continue following ophthalmologist for her diabetic retinopathy.  Patient to continue taking pravastatin as prescribed.    Health maintenance reviewed with patient.     Follow up in about 6 months (around 11/10/2024).    Corey Hernandez MD  Internal Medicine / Primary Care  [facility]  5/10/2024

## 2024-05-15 ENCOUNTER — HOSPITAL ENCOUNTER (OUTPATIENT)
Dept: WOUND CARE | Facility: HOSPITAL | Age: 64
Discharge: HOME | End: 2024-05-15
Attending: SURGERY

## 2024-05-15 VITALS — SYSTOLIC BLOOD PRESSURE: 99 MMHG | HEART RATE: 68 BPM | DIASTOLIC BLOOD PRESSURE: 50 MMHG

## 2024-05-15 DIAGNOSIS — Z79.4 TYPE 2 DIABETES MELLITUS WITH DIABETIC POLYNEUROPATHY, WITH LONG-TERM CURRENT USE OF INSULIN: ICD-10-CM

## 2024-05-15 DIAGNOSIS — S81.801D WOUND OF RIGHT LOWER EXTREMITY, SUBSEQUENT ENCOUNTER: ICD-10-CM

## 2024-05-15 DIAGNOSIS — E11.42 TYPE 2 DIABETES MELLITUS WITH DIABETIC POLYNEUROPATHY, WITH LONG-TERM CURRENT USE OF INSULIN: ICD-10-CM

## 2024-05-15 DIAGNOSIS — L97.312 ULCER OF RIGHT ANKLE, WITH FAT LAYER EXPOSED: Primary | ICD-10-CM

## 2024-05-15 PROCEDURE — 11042 DBRDMT SUBQ TIS 1ST 20SQCM/<: CPT

## 2024-05-15 NOTE — PROGRESS NOTES
"                     [facility]    Subjective:       Patient ID: Shelley Levy is a 63 y.o. female.    Chief Complaint: Wound Check and Wound Care    5/15/2024  Follow up with Dr Zuleta related to right medial and lateral ankle wounds. Patient states that after the debridement last week "my nerves were on fire all through the night".  Wound improvement noted to both wounds. Right anterior leg wound still resolved. Patient tolerated sharp debridement and silver nitrate to right medial ankle wound well. Plan of care updated. Patient states that her insurance is reinstated "and should be back to normal in a month, but everything from February on will have to be resubmitted", is unsure whether she would be able to get home health or supplies as "it's not back yet".      Review of Systems   Constitutional: Negative.    HENT: Negative.     Eyes: Negative.    Respiratory: Negative.     Cardiovascular: Negative.    Gastrointestinal: Negative.    Genitourinary: Negative.    Musculoskeletal: Negative.    Skin: Negative.    Neurological: Negative.    Psychiatric/Behavioral: Negative.         Objective:     Vitals:    05/15/24 1500   BP: (!) 99/50   Pulse: 68         Physical Exam  Constitutional:       Appearance: She is well-developed.   HENT:      Head: Normocephalic.   Eyes:      Conjunctiva/sclera: Conjunctivae normal.      Pupils: Pupils are equal, round, and reactive to light.   Cardiovascular:      Rate and Rhythm: Normal rate and regular rhythm.      Heart sounds: Normal heart sounds.   Pulmonary:      Effort: Pulmonary effort is normal.      Breath sounds: Normal breath sounds.   Abdominal:      General: Bowel sounds are normal.      Palpations: Abdomen is soft.   Musculoskeletal:         General: Normal range of motion.      Cervical back: Normal range of motion and neck supple.   Skin:     General: Skin is warm and dry.   Neurological:      Mental Status: She is alert and oriented " to person, place, and time.      Deep Tendon Reflexes: Reflexes are normal and symmetric.        Wound 04/17/24 1400 Venous Ulcer Right medial Ankle (Active)   04/17/24 1400   Present on Original Admission: Y   Primary Wound Type: Venous ulcer   Side: Right   Orientation: medial   Location: Ankle   Wound Approximate Age at First Assessment (Weeks):    Wound Number:    Is this injury device related?:    Incision Type:    Closure Method:    Wound Description (Comments):    Type:    Additional Comments:    Ankle-Brachial Index:    Pulses: doppler   Removal Indication and Assessment:    Wound Outcome:    Wound Image   05/15/24 1500   Dressing Appearance Moist drainage 05/15/24 1500   Drainage Amount Moderate 05/15/24 1500   Drainage Characteristics/Odor Serosanguineous 05/15/24 1500   Appearance Slough;Moist 05/15/24 1500   Tissue loss description Full thickness 05/15/24 1500   Red (%), Wound Tissue Color 10 % 05/15/24 1500   Yellow (%), Wound Tissue Color 90 % 05/15/24 1500   Periwound Area Intact;Dry 05/15/24 1500   Wound Edges Irregular 05/15/24 1500   Wound Length (cm) 2.5 cm 05/15/24 1500   Wound Width (cm) 3.2 cm 05/15/24 1500   Wound Depth (cm) 0.1 cm 05/15/24 1500   Wound Volume (cm^3) 0.8 cm^3 05/15/24 1500   Wound Surface Area (cm^2) 8 cm^2 05/15/24 1500   Care Cleansed with:;Sterile normal saline;Debrided 05/15/24 1500   Dressing Applied;Methylene blue/gentian violet;Hydrofiber;Island/border;Tubular bandage 05/15/24 1500   Periwound Care Absorptive dressing applied;Moisturizer applied 05/15/24 1500   Compression Tubular elasticized bandage 05/15/24 1500   Dressing Change Due 05/17/24 05/15/24 1500            Wound 04/17/24 1400 Venous Ulcer Right lateral Ankle (Active)   04/17/24 1400   Present on Original Admission: Y   Primary Wound Type: Venous ulcer   Side: Right   Orientation: lateral   Location: Ankle   Wound Approximate Age at First Assessment (Weeks):    Wound Number:    Is this injury device  related?:    Incision Type:    Closure Method:    Wound Description (Comments):    Type:    Additional Comments:    Ankle-Brachial Index:    Pulses: doppler   Removal Indication and Assessment:    Wound Outcome:    Wound Image   05/15/24 1500   Dressing Appearance Moist drainage;Intact 05/15/24 1500   Drainage Amount Moderate 05/15/24 1500   Drainage Characteristics/Odor Serosanguineous 05/15/24 1500   Appearance Yellow;Slough;Moist 05/15/24 1500   Tissue loss description Full thickness 05/15/24 1500   Red (%), Wound Tissue Color 80 % 05/15/24 1500   Yellow (%), Wound Tissue Color 20 % 05/15/24 1500   Periwound Area Satellite lesion;Dry 05/15/24 1500   Wound Edges Irregular 05/15/24 1500   Wound Length (cm) 1.1 cm 05/15/24 1500   Wound Width (cm) 2.8 cm 05/15/24 1500   Wound Depth (cm) 0.1 cm 05/15/24 1500   Wound Volume (cm^3) 0.308 cm^3 05/15/24 1500   Wound Surface Area (cm^2) 3.08 cm^2 05/15/24 1500   Care Cleansed with:;Sterile normal saline;Debrided 05/15/24 1500   Dressing Applied;Methylene blue/gentian violet;Hydrofiber;Island/border;Tubular bandage 05/15/24 1500   Periwound Care Absorptive dressing applied;Moisturizer applied 05/15/24 1500   Compression Tubular elasticized bandage 05/15/24 1500   Dressing Change Due 05/17/24 05/15/24 1500         Assessment/Plan:         ICD-10-CM ICD-9-CM   1. Ulcer of right ankle, with fat layer exposed  L97.312 707.13           Tissue pathology and/or culture taken:  [] Yes [x] No   Sharp debridement performed:   [x] Yes [] No   Labs ordered this visit:   [] Yes [x] No   Imaging ordered this visit:   [] Yes [x] No           Orders Placed This Encounter   Procedures    Change dressing     Right medial ankle, right lateral ankle wounds:   Cleanse wound with: normal saline   Lidocaine:prn   Silver nitrate:prn   Periwound care: moisturizer as needed   Primary dressing: santyl, bactroban, gentamicin, hydrofera blue ready   Secondary dressing: tegaderm  Edema control: 1 layer  tubigrip F, limb elevation   Frequency:every other day and as needed   Follow-up:1 week    Home Health: Admit patient to home health and continue with wound care as above three times weekly and as needed unless the patient is in clinic. Thank you.   Other: continue Augmentin PO BID        Follow up in about 1 week (around 5/22/2024) for Dr Zuleta.            This includes face to face time and non-face to face time preparing to see the patient (eg, review of tests), obtaining and/or reviewing separately obtained history, documenting clinical information in the electronic or other health record, independently interpreting results and communicating results to the patient/family/caregiver, or care coordinator.

## 2024-05-17 ENCOUNTER — OFFICE VISIT (OUTPATIENT)
Dept: OPHTHALMOLOGY | Facility: CLINIC | Age: 64
End: 2024-05-17

## 2024-05-17 DIAGNOSIS — H25.13 AGE-RELATED NUCLEAR CATARACT OF BOTH EYES: ICD-10-CM

## 2024-05-17 DIAGNOSIS — H35.033 HYPERTENSIVE RETINOPATHY OF BOTH EYES: ICD-10-CM

## 2024-05-17 DIAGNOSIS — H43.813 VITREOUS DEGENERATION OF BOTH EYES: ICD-10-CM

## 2024-05-17 DIAGNOSIS — E11.3313 MODERATE NONPROLIFERATIVE DIABETIC RETINOPATHY OF BOTH EYES WITH MACULAR EDEMA ASSOCIATED WITH TYPE 2 DIABETES MELLITUS: Primary | ICD-10-CM

## 2024-05-17 PROCEDURE — G2211 COMPLEX E/M VISIT ADD ON: HCPCS | Mod: S$PBB,,, | Performed by: OPHTHALMOLOGY

## 2024-05-17 PROCEDURE — 99999 PR PBB SHADOW E&M-EST. PATIENT-LVL IV: CPT | Mod: PBBFAC,,, | Performed by: OPHTHALMOLOGY

## 2024-05-17 PROCEDURE — 92202 OPSCPY EXTND ON/MAC DRAW: CPT | Mod: 59,S$PBB,, | Performed by: OPHTHALMOLOGY

## 2024-05-17 PROCEDURE — 99213 OFFICE O/P EST LOW 20 MIN: CPT | Mod: S$PBB,,, | Performed by: OPHTHALMOLOGY

## 2024-05-17 PROCEDURE — 99214 OFFICE O/P EST MOD 30 MIN: CPT | Mod: PBBFAC | Performed by: OPHTHALMOLOGY

## 2024-05-17 PROCEDURE — 92202 OPSCPY EXTND ON/MAC DRAW: CPT | Mod: 59,PBBFAC | Performed by: OPHTHALMOLOGY

## 2024-05-17 PROCEDURE — 92134 CPTRZ OPH DX IMG PST SGM RTA: CPT | Mod: PBBFAC | Performed by: OPHTHALMOLOGY

## 2024-05-17 NOTE — PROGRESS NOTES
HPI    1m OCT/DFE/poss YVAN OU    Pt states va is worse since last visit OU.  Floaters OU, may be improved..   Pt c/o having an episode of double va about 2 weeks ago, has not happened   since. Pt c/o itchy LL. Stable dry eye. Hazy va OU.     No flashes   Floaters OU   No pain   +dry eye   +irritation   Gtts: Thera Tears BID-QID OU     POHx:  Moderate nonproliferative diabetic retinopathy of both eyes with macular   edema associated with type 2 diabetes mellitus  Vitreous degeneration of both eyes  Hypertensive retinopathy of both eyes  Age-related nuclear cataract of both eyes    Last edited by Mai Holland on 5/17/2024  1:33 PM.          A/P    ICD-10-CM ICD-9-CM   1. 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 Vitreous degeneration of both eyes  H43.813 379.21   3. Hypertensive retinopathy of both eyes  H35.033 362.11   4. Age-related nuclear cataract of both eyes  H25.13 366.16       1. Moderate nonproliferative diabetic retinopathy of both eyes with macular edema associated with type 2 diabetes mellitus  2. Diabetic macular edema of both eyes  PCP  Corey Hernandez MD - Recent notes reviewed   02/09/2024  5.5  A1C     OD: s/p NEFTALY x2 4/12/24  VA 20/40 (was 20/25), mod DR with DME involving fovea - better today   Plan: given foveal DME, would benefit from Avastin OD in better seeing eye but pt has been having insurance issues, self pay today, will recheck in 6 weeks once her insurance is re-instated    OS: s/p NEFTALY x2 4/12/24  VA 20/200 (Was 20/150), hx of amblyopia, stable DME today  Plan:  would benefit from Avastin OS today but pt's insurance has had issues and she is in a gap period, we will bring back 6 weeks for recheck for possible Avastin and then refer for CEIOL    Visit today is associated with current or anticipated ongoing medical care related to this patients single serious  condition/complex condition (diabetic macular edema)     Recommend good blood pressure control, tight blood glucose control, and good cholesterol control     3. Vitreous degeneration of both eyes  No RT/RD  Plan: Observation      4. Hypertensive retinopathy of both eyes  Mild HTN changes  Plan: Observation  Recommend good blood pressure control, tight blood glucose control, and good cholesterol control     5. Age-related nuclear cataract of both eyes  VS NS  Amblyopia OS  Plan: would benefit from CEIOL eval but pt waiting on insurance to be fixed     RTC 6 weeks DFE/OCTm OU, poss Avastin OU        I saw and examined the patient and reviewed in detail the findings documented. The final examination findings, image interpretations which have been independently interpreted, and plan as documented in the record represent my personal judgment and conclusions.    Amari Tran MD  Vitreoretinal Surgery   [facility]

## 2024-05-20 RX ORDER — AMITRIPTYLINE HYDROCHLORIDE 10 MG/1
10 TABLET, FILM COATED ORAL NIGHTLY
Qty: 90 TABLET | Refills: 2 | Status: SHIPPED | OUTPATIENT
Start: 2024-05-20

## 2024-05-20 NOTE — PROCEDURES
"Debridement    Date/Time: 5/15/2024 2:03 PM    Performed by: Narciso Zuleta MD  Authorized by: Narciso Zuleta MD    Time out: Immediately prior to procedure a "time out" was called to verify the correct patient, procedure, equipment, support staff and site/side marked as required.    Consent Done?:  Yes (Verbal)    Preparation: Patient was prepped and draped in usual sterile fashion and Patient was prepped and draped with clean technique    Local anesthesia used?: Yes    Local anesthetic:  Topical anesthetic    Wound Details:    Location:  Right leg    Type of Debridement:  Excisional       Length (cm):  2.5       Area (sq cm):  8.75       Width (cm):  3.5       Percent Debrided (%):  100       Depth (cm):  0.3       Total Area Debrided (sq cm):  8.75    Depth of debridement:  Subcutaneous tissue    Tissue debrided:  Subcutaneous    Devitalized tissue debrided:  Necrotic/Eschar, Slough and Fibrin    Instruments:  Curette  Bleeding:  Minimal  Hemostasis Achieved: Yes  Method Used:  Pressure and Silver Nitrate  Patient tolerance:  Patient tolerated the procedure well with no immediate complications  1st Wound Pain Assessment: 2  "

## 2024-05-22 ENCOUNTER — HOSPITAL ENCOUNTER (OUTPATIENT)
Dept: WOUND CARE | Facility: HOSPITAL | Age: 64
Discharge: HOME | End: 2024-05-22
Attending: SURGERY

## 2024-05-22 VITALS
HEART RATE: 66 BPM | TEMPERATURE: 97 F | DIASTOLIC BLOOD PRESSURE: 61 MMHG | SYSTOLIC BLOOD PRESSURE: 103 MMHG | WEIGHT: 157 LBS | BODY MASS INDEX: 33.87 KG/M2 | HEIGHT: 57 IN

## 2024-05-22 DIAGNOSIS — Z79.4 TYPE 2 DIABETES MELLITUS WITH DIABETIC AUTONOMIC NEUROPATHY, WITH LONG-TERM CURRENT USE OF INSULIN: ICD-10-CM

## 2024-05-22 DIAGNOSIS — L97.312 ULCER OF RIGHT ANKLE, WITH FAT LAYER EXPOSED: Primary | ICD-10-CM

## 2024-05-22 DIAGNOSIS — E11.43 TYPE 2 DIABETES MELLITUS WITH DIABETIC AUTONOMIC NEUROPATHY, WITH LONG-TERM CURRENT USE OF INSULIN: ICD-10-CM

## 2024-05-22 PROCEDURE — 11042 DBRDMT SUBQ TIS 1ST 20SQCM/<: CPT

## 2024-05-22 NOTE — PROGRESS NOTES
[facility]    Subjective:       Patient ID: Shelley Levy is a 63 y.o. female.    Chief Complaint: Wound Care    HPI  Review of Systems      Objective:     Vitals:    05/22/24 1420   BP: 103/61   Pulse: 66   Temp: 97.4 °F (36.3 °C)         Physical Exam       Wound 04/17/24 1400 Venous Ulcer Right medial Ankle (Active)   04/17/24 1400   Present on Original Admission: Y   Primary Wound Type: Venous ulcer   Side: Right   Orientation: medial   Location: Ankle   Wound Approximate Age at First Assessment (Weeks):    Wound Number:    Is this injury device related?:    Incision Type:    Closure Method:    Wound Description (Comments):    Type:    Additional Comments:    Ankle-Brachial Index:    Pulses: doppler   Removal Indication and Assessment:    Wound Outcome:    Wound Image   05/22/24 1502   Dressing Appearance Moist drainage 05/22/24 1502   Drainage Amount Moderate 05/22/24 1502   Drainage Characteristics/Odor Serosanguineous 05/22/24 1502   Appearance Slough;Moist;Yellow 05/22/24 1502   Tissue loss description Full thickness 05/22/24 1502   Black (%), Wound Tissue Color 20 % 05/22/24 1502   Red (%), Wound Tissue Color 30 % 05/22/24 1502   Yellow (%), Wound Tissue Color 50 % 05/22/24 1502   Periwound Area Intact;Dry 05/22/24 1502   Wound Edges Irregular 05/22/24 1502   Wound Length (cm) 2 cm 05/22/24 1502   Wound Width (cm) 3 cm 05/22/24 1502   Wound Depth (cm) 0.1 cm 05/22/24 1502   Wound Volume (cm^3) 0.6 cm^3 05/22/24 1502   Wound Surface Area (cm^2) 6 cm^2 05/22/24 1502   Care Cleansed with:;Sterile normal saline;Soap and water;Debrided 05/22/24 1502   Dressing Applied;Hydrofiber;Island/border;Tubular bandage 05/22/24 1502   Periwound Care Absorptive dressing applied 05/22/24 1502   Compression Tubular elasticized bandage 05/22/24 1502   Dressing Change Due 05/29/24 05/22/24 1502            Wound 04/17/24 1400 Venous Ulcer Right lateral Ankle (Active)    04/17/24 1400   Present on Original Admission: Y   Primary Wound Type: Venous ulcer   Side: Right   Orientation: lateral   Location: Ankle   Wound Approximate Age at First Assessment (Weeks):    Wound Number:    Is this injury device related?:    Incision Type:    Closure Method:    Wound Description (Comments):    Type:    Additional Comments:    Ankle-Brachial Index:    Pulses: doppler   Removal Indication and Assessment:    Wound Outcome:    Wound Image   05/22/24 1502   Dressing Appearance Moist drainage;Intact 05/22/24 1502   Drainage Amount Moderate 05/22/24 1502   Drainage Characteristics/Odor Serosanguineous 05/22/24 1502   Appearance Yellow;Slough 05/22/24 1502   Tissue loss description Full thickness 05/22/24 1502   Red (%), Wound Tissue Color 80 % 05/22/24 1502   Yellow (%), Wound Tissue Color 20 % 05/22/24 1502   Periwound Area Satellite lesion;Dry 05/22/24 1502   Wound Edges Irregular 05/22/24 1502   Wound Length (cm) 1 cm 05/22/24 1502   Wound Width (cm) 2.5 cm 05/22/24 1502   Wound Depth (cm) 0.1 cm 05/22/24 1502   Wound Volume (cm^3) 0.25 cm^3 05/22/24 1502   Wound Surface Area (cm^2) 2.5 cm^2 05/22/24 1502   Care Cleansed with:;Sterile normal saline 05/22/24 1502   Dressing Applied;Island/border;Hydrofiber;Tubular bandage 05/22/24 1502   Periwound Care Absorptive dressing applied 05/22/24 1502   Compression Tubular elasticized bandage 05/22/24 1502   Dressing Change Due 05/29/24 05/22/24 1502            Wound 04/17/24 1509 Venous Ulcer Right anterior;lower Leg (Active)   04/17/24 1509   Present on Original Admission: Y   Primary Wound Type: Venous ulcer   Side: Right   Orientation: anterior;lower   Location: Leg   Wound Approximate Age at First Assessment (Weeks):    Wound Number:    Is this injury device related?:    Incision Type:    Closure Method:    Wound Description (Comments):    Type:    Additional Comments:    Ankle-Brachial Index:    Pulses: doppler   Removal Indication and Assessment:     Wound Outcome:    Wound Image   05/22/24 1502   Dressing Appearance Open to air;Dry 05/22/24 1502   Drainage Amount None 05/22/24 1502   Yellow (%), Wound Tissue Color 100 % 05/22/24 1502   Periwound Area Intact;Dry 05/22/24 1502   Wound Edges Rolled/closed 05/22/24 1502   Wound Length (cm) 0 cm 05/22/24 1502   Wound Width (cm) 0 cm 05/22/24 1502   Wound Depth (cm) 0 cm 05/22/24 1502   Wound Volume (cm^3) 0 cm^3 05/22/24 1502   Wound Surface Area (cm^2) 0 cm^2 05/22/24 1502   Care Cleansed with:;Sterile normal saline 05/22/24 1502   Dressing Change Due 05/29/24 05/22/24 1502         Assessment/Plan:         ICD-10-CM ICD-9-CM   1. Ulcer of right ankle, with fat layer exposed  L97.312 707.13           Tissue pathology and/or culture taken:  [] Yes [x] No   Sharp debridement performed:   [x] Yes [] No   Labs ordered this visit:   [] Yes [x] No   Imaging ordered this visit:   [] Yes [x] No           Orders Placed This Encounter   Procedures    Change dressing     Right medial ankle, right lateral ankle wounds:   Cleanse wound with: normal saline   Lidocaine:prn   Silver nitrate:prn   Periwound care: moisturizer as needed   Primary dressing: santyl, bactroban, gentamicin, hydrofera blue ready   Secondary dressing: tegaderm   Edema control: 1 layer tubigrip F, limb elevation   Frequency:every other day and as needed   Follow-up:1 week    Other: continue Augmentin PO BID        Follow up in about 1 week (around 5/29/2024).            This includes face to face time and non-face to face time preparing to see the patient (eg, review of tests), obtaining and/or reviewing separately obtained history, documenting clinical information in the electronic or other health record, independently interpreting results and communicating results to the patient/family/caregiver, or care coordinator.

## 2024-05-22 NOTE — PROCEDURES
"Debridement    Date/Time: 5/22/2024 2:02 PM    Performed by: Narciso Zuleta MD  Authorized by: Narciso Zuleta MD    Time out: Immediately prior to procedure a "time out" was called to verify the correct patient, procedure, equipment, support staff and site/side marked as required.    Consent Done?:  Yes (Verbal)    Preparation: Patient was prepped and draped in usual sterile fashion and Patient was prepped and draped with clean technique    Local anesthesia used?: Yes    Local anesthetic:  Topical anesthetic    Wound Details:    Location:  Right leg    Type of Debridement:  Excisional       Length (cm):  2       Area (sq cm):  6       Width (cm):  3       Percent Debrided (%):  100       Depth (cm):  0.3       Total Area Debrided (sq cm):  6    Depth of debridement:  Subcutaneous tissue    Tissue debrided:  Subcutaneous    Devitalized tissue debrided:  Exudate, Fibrin, Necrotic/Eschar and Slough    Instruments:  Curette  Bleeding:  Minimal  Hemostasis Achieved: Yes  Method Used:  Pressure and Silver Nitrate  Patient tolerance:  Patient tolerated the procedure well with no immediate complications  1st Wound Pain Assessment: 3  "

## 2024-05-22 NOTE — PROGRESS NOTES
[facility]    Subjective:       Patient ID: Shelley Levy is a 63 y.o. female.    Chief Complaint: Wound Care    Follow up for Right medial ankle.Debrided,no new concerns.Continue with same plan of care.  Review of Systems   Constitutional: Negative.    HENT: Negative.     Eyes: Negative.    Respiratory: Negative.     Cardiovascular: Negative.    Gastrointestinal: Negative.    Genitourinary: Negative.    Musculoskeletal: Negative.    Skin: Negative.    Neurological: Negative.    Psychiatric/Behavioral: Negative.         Objective:     Vitals:    05/22/24 1420   BP: 103/61   Pulse: 66   Temp: 97.4 °F (36.3 °C)         Physical Exam  Constitutional:       Appearance: She is well-developed.   HENT:      Head: Normocephalic.   Eyes:      Conjunctiva/sclera: Conjunctivae normal.      Pupils: Pupils are equal, round, and reactive to light.   Cardiovascular:      Rate and Rhythm: Normal rate and regular rhythm.      Heart sounds: Normal heart sounds.   Pulmonary:      Effort: Pulmonary effort is normal.      Breath sounds: Normal breath sounds.   Abdominal:      General: Bowel sounds are normal.      Palpations: Abdomen is soft.   Musculoskeletal:         General: Swelling and tenderness present. Normal range of motion.      Cervical back: Normal range of motion and neck supple.      Right lower leg: Edema present.   Skin:     General: Skin is warm and dry.      Findings: Erythema, lesion and rash present.   Neurological:      Mental Status: She is alert and oriented to person, place, and time.      Deep Tendon Reflexes: Reflexes are normal and symmetric.        Wound 04/17/24 1400 Venous Ulcer Right medial Ankle (Active)   04/17/24 1400   Present on Original Admission: Y   Primary Wound Type: Venous ulcer   Side: Right   Orientation: medial   Location: Ankle   Wound Approximate Age at First Assessment (Weeks):    Wound Number:    Is this injury device related?:     Incision Type:    Closure Method:    Wound Description (Comments):    Type:    Additional Comments:    Ankle-Brachial Index:    Pulses: doppler   Removal Indication and Assessment:    Wound Outcome:    Wound Image   05/22/24 1502   Dressing Appearance Moist drainage 05/22/24 1502   Drainage Amount Moderate 05/22/24 1502   Drainage Characteristics/Odor Serosanguineous 05/22/24 1502   Appearance Slough;Moist;Yellow 05/22/24 1502   Tissue loss description Full thickness 05/22/24 1502   Black (%), Wound Tissue Color 20 % 05/22/24 1502   Red (%), Wound Tissue Color 30 % 05/22/24 1502   Yellow (%), Wound Tissue Color 50 % 05/22/24 1502   Periwound Area Intact;Dry 05/22/24 1502   Wound Edges Irregular 05/22/24 1502   Wound Length (cm) 2 cm 05/22/24 1502   Wound Width (cm) 3 cm 05/22/24 1502   Wound Depth (cm) 0.1 cm 05/22/24 1502   Wound Volume (cm^3) 0.6 cm^3 05/22/24 1502   Wound Surface Area (cm^2) 6 cm^2 05/22/24 1502   Care Cleansed with:;Sterile normal saline;Soap and water;Debrided 05/22/24 1502   Dressing Applied;Hydrofiber;Island/border;Tubular bandage 05/22/24 1502   Periwound Care Absorptive dressing applied 05/22/24 1502   Compression Tubular elasticized bandage 05/22/24 1502   Dressing Change Due 05/29/24 05/22/24 1502            Wound 04/17/24 1400 Venous Ulcer Right lateral Ankle (Active)   04/17/24 1400   Present on Original Admission: Y   Primary Wound Type: Venous ulcer   Side: Right   Orientation: lateral   Location: Ankle   Wound Approximate Age at First Assessment (Weeks):    Wound Number:    Is this injury device related?:    Incision Type:    Closure Method:    Wound Description (Comments):    Type:    Additional Comments:    Ankle-Brachial Index:    Pulses: doppler   Removal Indication and Assessment:    Wound Outcome:    Wound Image   05/22/24 1502   Dressing Appearance Moist drainage;Intact 05/22/24 1502   Drainage Amount Moderate 05/22/24 1502   Drainage Characteristics/Odor Serosanguineous  05/22/24 1502   Appearance Yellow;Slough 05/22/24 1502   Tissue loss description Full thickness 05/22/24 1502   Red (%), Wound Tissue Color 80 % 05/22/24 1502   Yellow (%), Wound Tissue Color 20 % 05/22/24 1502   Periwound Area Satellite lesion;Dry 05/22/24 1502   Wound Edges Irregular 05/22/24 1502   Wound Length (cm) 1 cm 05/22/24 1502   Wound Width (cm) 2.5 cm 05/22/24 1502   Wound Depth (cm) 0.1 cm 05/22/24 1502   Wound Volume (cm^3) 0.25 cm^3 05/22/24 1502   Wound Surface Area (cm^2) 2.5 cm^2 05/22/24 1502   Care Cleansed with:;Sterile normal saline 05/22/24 1502   Dressing Applied;Island/border;Hydrofiber;Tubular bandage 05/22/24 1502   Periwound Care Absorptive dressing applied 05/22/24 1502   Compression Tubular elasticized bandage 05/22/24 1502   Dressing Change Due 05/29/24 05/22/24 1502            Wound 04/17/24 1509 Venous Ulcer Right anterior;lower Leg (Active)   04/17/24 1509   Present on Original Admission: Y   Primary Wound Type: Venous ulcer   Side: Right   Orientation: anterior;lower   Location: Leg   Wound Approximate Age at First Assessment (Weeks):    Wound Number:    Is this injury device related?:    Incision Type:    Closure Method:    Wound Description (Comments):    Type:    Additional Comments:    Ankle-Brachial Index:    Pulses: doppler   Removal Indication and Assessment:    Wound Outcome:    Wound Image   05/22/24 1502   Dressing Appearance Open to air;Dry 05/22/24 1502   Drainage Amount None 05/22/24 1502   Yellow (%), Wound Tissue Color 100 % 05/22/24 1502   Periwound Area Intact;Dry 05/22/24 1502   Wound Edges Rolled/closed 05/22/24 1502   Wound Length (cm) 0 cm 05/22/24 1502   Wound Width (cm) 0 cm 05/22/24 1502   Wound Depth (cm) 0 cm 05/22/24 1502   Wound Volume (cm^3) 0 cm^3 05/22/24 1502   Wound Surface Area (cm^2) 0 cm^2 05/22/24 1502   Care Cleansed with:;Sterile normal saline 05/22/24 1502   Dressing Change Due 05/29/24 05/22/24 1502         Assessment/Plan:         ICD-10-CM  ICD-9-CM   1. Ulcer of right ankle, with fat layer exposed  L97.312 707.13           Tissue pathology and/or culture taken:  [] Yes [x] No   Sharp debridement performed:   [x] Yes [] No   Labs ordered this visit:   [] Yes [x] No   Imaging ordered this visit:   [] Yes [x] No           Orders Placed This Encounter   Procedures    Change dressing     Right medial ankle, right lateral ankle wounds:   Cleanse wound with: normal saline   Lidocaine:prn   Silver nitrate:prn   Periwound care: moisturizer as needed   Primary dressing: santyl, bactroban, gentamicin, hydrofera blue ready   Secondary dressing: tegaderm   Edema control: 1 layer tubigrip F, limb elevation   Frequency:every other day and as needed   Follow-up:1 week    Other: continue Augmentin PO BID        Follow up in about 1 week (around 5/29/2024).            This includes face to face time and non-face to face time preparing to see the patient (eg, review of tests), obtaining and/or reviewing separately obtained history, documenting clinical information in the electronic or other health record, independently interpreting results and communicating results to the patient/family/caregiver, or care coordinator.

## 2024-05-29 ENCOUNTER — HOSPITAL ENCOUNTER (OUTPATIENT)
Dept: WOUND CARE | Facility: HOSPITAL | Age: 64
Discharge: HOME | End: 2024-05-29
Attending: SURGERY

## 2024-05-29 ENCOUNTER — PATIENT MESSAGE (OUTPATIENT)
Dept: ADMINISTRATIVE | Facility: OTHER | Age: 64
End: 2024-05-29

## 2024-05-29 DIAGNOSIS — L97.312 ULCER OF RIGHT ANKLE, WITH FAT LAYER EXPOSED: Primary | ICD-10-CM

## 2024-05-29 PROCEDURE — 99213 OFFICE O/P EST LOW 20 MIN: CPT

## 2024-05-29 NOTE — PROGRESS NOTES
[facility]    Subjective:       Patient ID: Shelley Levy is a 63 y.o. female.    Chief Complaint: Non-healing Wound Follow Up and Wound Care    Follow up for right ankle wounds that present very moist under the Tegaderm. New orders noted.   Review of Systems   Constitutional: Negative.    HENT: Negative.     Eyes: Negative.    Respiratory: Negative.     Cardiovascular: Negative.    Gastrointestinal: Negative.    Genitourinary: Negative.    Musculoskeletal: Negative.    Skin: Negative.    Neurological: Negative.    Psychiatric/Behavioral: Negative.         Objective:   There were no vitals filed for this visit.      Physical Exam  Constitutional:       Appearance: She is well-developed.   HENT:      Head: Normocephalic.   Eyes:      Conjunctiva/sclera: Conjunctivae normal.      Pupils: Pupils are equal, round, and reactive to light.   Cardiovascular:      Rate and Rhythm: Normal rate and regular rhythm.      Heart sounds: Normal heart sounds.   Pulmonary:      Effort: Pulmonary effort is normal.      Breath sounds: Normal breath sounds.   Abdominal:      General: Bowel sounds are normal.      Palpations: Abdomen is soft.   Musculoskeletal:         General: Normal range of motion.      Cervical back: Normal range of motion and neck supple.   Skin:     General: Skin is warm and dry.   Neurological:      Mental Status: She is alert and oriented to person, place, and time.      Deep Tendon Reflexes: Reflexes are normal and symmetric.        Wound 04/17/24 1400 Venous Ulcer Right medial Ankle (Active)   04/17/24 1400   Present on Original Admission: Y   Primary Wound Type: Venous ulcer   Side: Right   Orientation: medial   Location: Ankle   Wound Approximate Age at First Assessment (Weeks):    Wound Number:    Is this injury device related?:    Incision Type:    Closure Method:    Wound Description (Comments):    Type:    Additional Comments:    Ankle-Brachial Index:     Pulses: doppler   Removal Indication and Assessment:    Wound Outcome:    Wound Image   05/29/24 1527   Dressing Appearance Intact;Moist drainage 05/29/24 1527   Drainage Amount Moderate 05/29/24 1527   Drainage Characteristics/Odor Serosanguineous 05/29/24 1527   Appearance Intact;Slough;Moist 05/29/24 1527   Tissue loss description Full thickness 05/29/24 1527   Red (%), Wound Tissue Color 90 % 05/29/24 1527   Yellow (%), Wound Tissue Color 10 % 05/29/24 1527   Periwound Area Intact 05/29/24 1527   Wound Edges Irregular 05/29/24 1527   Wound Length (cm) 2 cm 05/29/24 1527   Wound Width (cm) 3 cm 05/29/24 1527   Wound Depth (cm) 0.1 cm 05/29/24 1527   Wound Volume (cm^3) 0.6 cm^3 05/29/24 1527   Wound Surface Area (cm^2) 6 cm^2 05/29/24 1527   Care Cleansed with:;Sterile normal saline 05/29/24 1527   Dressing Applied;Changed;Hydrofiber;Island/border;Cast padding 05/29/24 1527   Periwound Care Absorptive dressing applied;Topical treatment applied 05/29/24 1527   Dressing Change Due 05/31/24 05/29/24 1527            Wound 04/17/24 1400 Venous Ulcer Right lateral Ankle (Active)   04/17/24 1400   Present on Original Admission: Y   Primary Wound Type: Venous ulcer   Side: Right   Orientation: lateral   Location: Ankle   Wound Approximate Age at First Assessment (Weeks):    Wound Number:    Is this injury device related?:    Incision Type:    Closure Method:    Wound Description (Comments):    Type:    Additional Comments:    Ankle-Brachial Index:    Pulses: doppler   Removal Indication and Assessment:    Wound Outcome:    Wound Image   05/29/24 1527   Dressing Appearance Intact;Moist drainage 05/29/24 1527   Drainage Amount Moderate 05/29/24 1527   Drainage Characteristics/Odor Serosanguineous 05/29/24 1527   Appearance Intact;Red;Yellow;Slough 05/29/24 1527   Tissue loss description Full thickness 05/29/24 1527   Red (%), Wound Tissue Color 80 % 05/29/24 1527   Yellow (%), Wound Tissue Color 20 % 05/29/24 1527    Periwound Area Intact;Satellite lesion 05/29/24 1527   Wound Edges Irregular 05/29/24 1527   Wound Length (cm) 1 cm 05/29/24 1527   Wound Width (cm) 2.4 cm 05/29/24 1527   Wound Depth (cm) 0.1 cm 05/29/24 1527   Wound Volume (cm^3) 0.24 cm^3 05/29/24 1527   Wound Surface Area (cm^2) 2.4 cm^2 05/29/24 1527   Care Cleansed with:;Sterile normal saline 05/29/24 1527   Dressing Applied;Changed;Hydrofiber;Island/border;Cast padding 05/29/24 1527   Periwound Care Absorptive dressing applied;Topical treatment applied 05/29/24 1527   Dressing Change Due 05/31/24 05/29/24 1527         Assessment/Plan:         ICD-10-CM ICD-9-CM   1. Ulcer of right ankle, with fat layer exposed  L97.312 707.13           Tissue pathology and/or culture taken:  [] Yes [x] No   Sharp debridement performed:   [] Yes [x] No   Labs ordered this visit:   [] Yes [x] No   Imaging ordered this visit:   [] Yes [x] No           Orders Placed This Encounter   Procedures    Change dressing     Right medial ankle, right lateral ankle wounds:  Cleanse wound with: normal saline  Lidocaine:prn  Silver nitrate:prn  Periwound care: moisturizer as needed  Primary dressing: Mupirocin, hydrofera blue ready  Secondary dressing: Mepore border or cast padding, Netting  Edema control:  limb elevation when possible  Frequency:every other day and as needed  Follow-up:1 week         Follow up in about 1 week (around 6/5/2024).            This includes face to face time and non-face to face time preparing to see the patient (eg, review of tests), obtaining and/or reviewing separately obtained history, documenting clinical information in the electronic or other health record, independently interpreting results and communicating results to the patient/family/caregiver, or care coordinator.

## 2024-06-04 ENCOUNTER — OFFICE VISIT (OUTPATIENT)
Dept: NEUROLOGY | Facility: CLINIC | Age: 64
End: 2024-06-04
Payer: COMMERCIAL

## 2024-06-04 VITALS
SYSTOLIC BLOOD PRESSURE: 129 MMHG | HEART RATE: 69 BPM | HEIGHT: 57 IN | BODY MASS INDEX: 34.46 KG/M2 | WEIGHT: 159.75 LBS | DIASTOLIC BLOOD PRESSURE: 75 MMHG

## 2024-06-04 DIAGNOSIS — G62.9 PERIPHERAL POLYNEUROPATHY: Primary | ICD-10-CM

## 2024-06-04 PROCEDURE — 99204 OFFICE O/P NEW MOD 45 MIN: CPT | Mod: S$GLB,,, | Performed by: PSYCHIATRY & NEUROLOGY

## 2024-06-04 PROCEDURE — 3044F HG A1C LEVEL LT 7.0%: CPT | Mod: CPTII,S$GLB,, | Performed by: PSYCHIATRY & NEUROLOGY

## 2024-06-04 PROCEDURE — 4010F ACE/ARB THERAPY RXD/TAKEN: CPT | Mod: CPTII,S$GLB,, | Performed by: PSYCHIATRY & NEUROLOGY

## 2024-06-04 PROCEDURE — 1159F MED LIST DOCD IN RCRD: CPT | Mod: CPTII,S$GLB,, | Performed by: PSYCHIATRY & NEUROLOGY

## 2024-06-04 PROCEDURE — 3074F SYST BP LT 130 MM HG: CPT | Mod: CPTII,S$GLB,, | Performed by: PSYCHIATRY & NEUROLOGY

## 2024-06-04 PROCEDURE — 99999 PR PBB SHADOW E&M-EST. PATIENT-LVL IV: CPT | Mod: PBBFAC,,, | Performed by: PSYCHIATRY & NEUROLOGY

## 2024-06-04 PROCEDURE — 3078F DIAST BP <80 MM HG: CPT | Mod: CPTII,S$GLB,, | Performed by: PSYCHIATRY & NEUROLOGY

## 2024-06-04 PROCEDURE — 3008F BODY MASS INDEX DOCD: CPT | Mod: CPTII,S$GLB,, | Performed by: PSYCHIATRY & NEUROLOGY

## 2024-06-04 NOTE — PROGRESS NOTES
Shelley Levy is a 63 y.o. year old female that  presents with chief complain of burning pain, numbness, tingling feet and hands.     HPI:  Mrs Levy has DM2 complicated by diabetic retinopathy, HLD, obesity, and R ankle ulcers.  She endorses at least a year of slowly worsening of daily intermittent burning pain, numbness, tingling, and warmth/cold feeling of feet/legs and hands, worse at night. Said that the legs feels weak and of balance and that she is bother by daily episodes of getting sweaty and clammy. No falls.  Complains of chronic low back pain. No bladder or bowel impairment. No HA, vertigo, double vision, slurred speech, language or visual impairment.  Recent serologies unimpressive.  Takes gabapentin 300 mg TID (higher dose make her sleepy) with marginal symptomatic control.    Past Medical History:   Diagnosis Date    Type 2 diabetes mellitus with neurologic complication 9/5/2023     Social History     Socioeconomic History    Marital status:    Tobacco Use    Smoking status: Never    Smokeless tobacco: Never   Substance and Sexual Activity    Alcohol use: No    Drug use: Never    Sexual activity: Not Currently     Partners: Female     Social Determinants of Health     Financial Resource Strain: Low Risk  (3/7/2024)    Overall Financial Resource Strain (CARDIA)     Difficulty of Paying Living Expenses: Not hard at all   Food Insecurity: No Food Insecurity (3/7/2024)    Hunger Vital Sign     Worried About Running Out of Food in the Last Year: Never true     Ran Out of Food in the Last Year: Never true   Transportation Needs: No Transportation Needs (3/7/2024)    PRAPARE - Transportation     Lack of Transportation (Medical): No     Lack of Transportation (Non-Medical): No   Physical Activity: Unknown (3/7/2024)    Exercise Vital Sign     Days of Exercise per Week: 4 days   Stress: No Stress Concern Present (3/7/2024)    [employer] - Occupational Stress  Questionnaire     Feeling of Stress : Only a little   Housing Stability: Unknown (3/7/2024)    Housing Stability Vital Sign     Unable to Pay for Housing in the Last Year: No     Unstable Housing in the Last Year: No     Past Surgical History:   Procedure Laterality Date    BREAST BIOPSY      BREAST LUMPECTOMY       Family History   Problem Relation Name Age of Onset    Breast cancer Mother      Ovarian cancer Mother      No Known Problems Father      No Known Problems Sister      No Known Problems Maternal Aunt      No Known Problems Maternal Uncle      No Known Problems Paternal Aunt      No Known Problems Paternal Uncle      Macular degeneration Maternal Grandmother      No Known Problems Maternal Grandfather      No Known Problems Paternal Grandmother      No Known Problems Paternal Grandfather      No Known Problems Brother      Amblyopia Neg Hx      Blindness Neg Hx      Cancer Neg Hx      Cataracts Neg Hx      Diabetes Neg Hx      Glaucoma Neg Hx      Hypertension Neg Hx      Retinal detachment Neg Hx      Strabismus Neg Hx      Stroke Neg Hx      Thyroid disease Neg Hx             Review of Systems  General ROS: negative for chills, fever or weight loss  Psychological ROS: negative for hallucination, depression or suicidal ideation  Ophthalmic ROS: negative for blurry vision, photophobia or eye pain  ENT ROS: negative for epistaxis, sore throat or rhinorrhea  Respiratory ROS: no cough, shortness of breath, or wheezing  Cardiovascular ROS: no chest pain or dyspnea on exertion  Gastrointestinal ROS: no abdominal pain, change in bowel habits, or black/ bloody stools  Genito-Urinary ROS: no dysuria, trouble voiding, or hematuria  Musculoskeletal ROS: POSITIVE for gait disturbance, no muscular weakness  Neurological ROS: no syncope or seizures; no ataxia  Dermatological ROS: negative for pruritis, rash and jaundice      Physical Exam:  /75 (BP Location: Right arm, Patient Position: Sitting, BP Method:  "Medium (Automatic))   Pulse 69   Ht 4' 9" (1.448 m)   Wt 72.4 kg (159 lb 11.6 oz)   BMI 34.56 kg/m²   General appearance: alert, cooperative, no distress  Constitutional:Oriented to person, place, and time.appears well-developed and well-nourished.   HEENT: Normocephalic, atraumatic, neck symmetrical, no nasal discharge   Eyes: conjunctivae/corneas clear, PERRL, EOM's intact  Lungs: clear to auscultation bilaterally, no dullness to percussion bilaterally  Heart: regular rate and rhythm without rub; no displacement of the PMI   Abdomen: soft, non-tender; bowel sounds normoactive; no organomegaly  Extremities: extremities symmetric; no clubbing, cyanosis, or edema  Integument: Skin color, texture, turgor normal; no rashes; hair distrubution normal  Neurologic:   Mental status: alert and awake, oriented x 4, comprehension, naming, and repetition intact. No right to left confusion. Performs serial 7's without difficulty .No dysarthria.  CN 2-12: pupils 4 mm bilaterally, reactive to light. Fundi without papilledema. Visual fields full to confrontation. EOM full without nystagmus. Face sensation normal in all distributions. Face symmetric. Hearing grossly intact. Palate elevates well. Tongue midline without atrophy or fasciculations.  Motor: 5/5 all over  Sensory: decreased vibratory at the toes  DTR's: hypo active at the patella and ankle bilaterally  Plantars: no tested.  Coordination: finger to nose and heel-knee-shin intact.  Gait: no ataxia or bradykinesia         LABS:    Complete Blood Count  Lab Results   Component Value Date    RBC 3.99 (L) 10/23/2023    HGB 12.0 10/23/2023    HCT 35.7 (L) 10/23/2023    MCV 90 10/23/2023    MCH 30.1 10/23/2023    MCHC 33.6 10/23/2023    RDW 12.7 10/23/2023     10/23/2023    MPV 9.6 10/23/2023    GRAN 5.0 10/23/2023    GRAN 68.3 10/23/2023    LYMPH 1.6 10/23/2023    LYMPH 22.3 10/23/2023    MONO 0.5 10/23/2023    MONO 6.8 10/23/2023    EOS 0.1 10/23/2023    BASO 0.05 " "10/23/2023    EOSINOPHIL 1.6 10/23/2023    BASOPHIL 0.7 10/23/2023    DIFFMETHOD Automated 10/23/2023       Comprehensive Metabolic Panel  Lab Results   Component Value Date    GLU 81 02/09/2024    BUN 30 (H) 02/09/2024    CREATININE 0.8 02/09/2024     02/09/2024    K 4.3 02/09/2024     02/09/2024    PROT 7.3 10/23/2023    ALBUMIN 3.9 10/23/2023    BILITOT 0.4 10/23/2023    AST 28 10/23/2023    ALKPHOS 74 10/23/2023    CO2 27 02/09/2024    ALT 48 (H) 10/23/2023    ANIONGAP 6 (L) 02/09/2024       TSH  No results found for: "TSH"      Assessment: Mrs Levy is a pleasant 63 /o with DM2 complicated by diabetic retinopathy, HLD, obesity, and R ankle ulcers who presents with a constellation of symptoms and findings on neuro exam highly consistent with a length dependent peripheral neuropathy, likely secondary to underlying DM.  Recent serologies unimpressive.  Will obtain EMG/NCV.        No diagnosis found.  There were no encounter diagnoses.      Plan:  1) Diabetic neuropathy: as above  2) DM2  3) Diabetic retinopathy  4) Obesity  5) HLD  6) R foot ulcers  No orders of the defined types were placed in this encounter.          Adan Richardson MD    "

## 2024-06-05 ENCOUNTER — HOSPITAL ENCOUNTER (OUTPATIENT)
Dept: WOUND CARE | Facility: HOSPITAL | Age: 64
Discharge: HOME | End: 2024-06-05
Attending: SURGERY
Payer: COMMERCIAL

## 2024-06-05 VITALS
SYSTOLIC BLOOD PRESSURE: 116 MMHG | WEIGHT: 159.63 LBS | BODY MASS INDEX: 34.44 KG/M2 | TEMPERATURE: 99 F | DIASTOLIC BLOOD PRESSURE: 79 MMHG | HEIGHT: 57 IN | HEART RATE: 71 BPM

## 2024-06-05 DIAGNOSIS — L97.312 ULCER OF RIGHT ANKLE, WITH FAT LAYER EXPOSED: Primary | ICD-10-CM

## 2024-06-05 PROCEDURE — 99214 OFFICE O/P EST MOD 30 MIN: CPT

## 2024-06-05 NOTE — PROGRESS NOTES
[facility]    Subjective:       Patient ID: Shelley Levy is a 63 y.o. female.    Chief Complaint: Wound Care    Wound care follow up for Right ankle wounds.Wounds are progressing well. Continue with same plan of care.  Review of Systems   Constitutional: Negative.    HENT: Negative.     Eyes: Negative.    Respiratory: Negative.     Cardiovascular: Negative.    Gastrointestinal: Negative.    Genitourinary: Negative.    Musculoskeletal: Negative.    Skin: Negative.    Neurological: Negative.    Psychiatric/Behavioral: Negative.         Objective:     Vitals:    06/05/24 1330   BP: 116/79   Pulse: 71   Temp: 98.9 °F (37.2 °C)         Physical Exam  Constitutional:       Appearance: She is well-developed.   HENT:      Head: Normocephalic.   Eyes:      Conjunctiva/sclera: Conjunctivae normal.      Pupils: Pupils are equal, round, and reactive to light.   Cardiovascular:      Rate and Rhythm: Normal rate and regular rhythm.      Heart sounds: Normal heart sounds.   Pulmonary:      Effort: Pulmonary effort is normal.      Breath sounds: Normal breath sounds.   Abdominal:      General: Bowel sounds are normal.      Palpations: Abdomen is soft.   Musculoskeletal:         General: Normal range of motion.      Cervical back: Normal range of motion and neck supple.   Skin:     General: Skin is warm and dry.   Neurological:      Mental Status: She is alert and oriented to person, place, and time.      Deep Tendon Reflexes: Reflexes are normal and symmetric.        Wound 04/17/24 1400 Venous Ulcer Right medial Ankle (Active)   04/17/24 1400   Present on Original Admission: Y   Primary Wound Type: Venous ulcer   Side: Right   Orientation: medial   Location: Ankle   Wound Approximate Age at First Assessment (Weeks):    Wound Number:    Is this injury device related?:    Incision Type:    Closure Method:    Wound Description (Comments):    Type:    Additional Comments:     Ankle-Brachial Index:    Pulses: doppler   Removal Indication and Assessment:    Wound Outcome:    Wound Image   06/05/24 1314   Dressing Appearance Intact;Moist drainage 06/05/24 1314   Drainage Amount Moderate 06/05/24 1314   Drainage Characteristics/Odor Serosanguineous 06/05/24 1314   Appearance Intact;Moist;Pink 06/05/24 1314   Tissue loss description Full thickness 06/05/24 1314   Red (%), Wound Tissue Color 100 % 06/05/24 1314   Periwound Area Intact 06/05/24 1314   Wound Edges Irregular 06/05/24 1314   Wound Length (cm) 1 cm 06/05/24 1314   Wound Width (cm) 2 cm 06/05/24 1314   Wound Depth (cm) 0.1 cm 06/05/24 1314   Wound Volume (cm^3) 0.2 cm^3 06/05/24 1314   Wound Surface Area (cm^2) 2 cm^2 06/05/24 1314   Care Cleansed with:;Sterile normal saline 06/05/24 1314   Dressing Applied;Hydrofiber;Island/border;Rolled gauze 06/05/24 1314   Periwound Care Absorptive dressing applied 06/05/24 1314   Dressing Change Due 06/19/24 06/05/24 1314            Wound 04/17/24 1400 Venous Ulcer Right lateral Ankle (Active)   04/17/24 1400   Present on Original Admission: Y   Primary Wound Type: Venous ulcer   Side: Right   Orientation: lateral   Location: Ankle   Wound Approximate Age at First Assessment (Weeks):    Wound Number:    Is this injury device related?:    Incision Type:    Closure Method:    Wound Description (Comments):    Type:    Additional Comments:    Ankle-Brachial Index:    Pulses: doppler   Removal Indication and Assessment:    Wound Outcome:    Wound Image   06/05/24 1314   Dressing Appearance Intact;Moist drainage 06/05/24 1314   Drainage Amount Moderate 06/05/24 1314   Drainage Characteristics/Odor Serosanguineous 06/05/24 1314   Appearance Intact;Pink;Yellow 06/05/24 1314   Tissue loss description Full thickness 06/05/24 1314   Red (%), Wound Tissue Color 80 % 06/05/24 1314   Yellow (%), Wound Tissue Color 20 % 06/05/24 1314   Periwound Area Intact;Satellite lesion 06/05/24 1314   Wound Edges  Irregular 06/05/24 1314   Wound Length (cm) 0.8 cm 06/05/24 1314   Wound Width (cm) 2 cm 06/05/24 1314   Wound Depth (cm) 0.1 cm 06/05/24 1314   Wound Volume (cm^3) 0.16 cm^3 06/05/24 1314   Wound Surface Area (cm^2) 1.6 cm^2 06/05/24 1314   Care Cleansed with:;Sterile normal saline 06/05/24 1314   Dressing Applied;Hydrofiber;Island/border;Rolled gauze 06/05/24 1314   Periwound Care Absorptive dressing applied 06/05/24 1314   Dressing Change Due 06/19/24 06/05/24 1314         Assessment/Plan:         ICD-10-CM ICD-9-CM   1. Ulcer of right ankle, with fat layer exposed  L97.312 707.13           Tissue pathology and/or culture taken:  [] Yes [x] No   Sharp debridement performed:   [] Yes [x] No   Labs ordered this visit:   [] Yes [x] No   Imaging ordered this visit:   [] Yes [x] No           Orders Placed This Encounter   Procedures    Change dressing     Right medial ankle, right lateral ankle wounds:  Cleanse wound with: normal saline  Lidocaine:prn  Silver nitrate:prn  Periwound care: moisturizer as needed  Primary dressing: Mupirocin, hydrofera blue ready  Secondary dressing: Mepore border or cast padding, Netting  Edema control:  limb elevation when possible  Frequency:every other day and as needed  Follow-up:1 week         Follow up in about 2 weeks (around 6/19/2024).            This includes face to face time and non-face to face time preparing to see the patient (eg, review of tests), obtaining and/or reviewing separately obtained history, documenting clinical information in the electronic or other health record, independently interpreting results and communicating results to the patient/family/caregiver, or care coordinator.

## 2024-06-10 ENCOUNTER — OFFICE VISIT (OUTPATIENT)
Dept: OPTOMETRY | Facility: CLINIC | Age: 64
End: 2024-06-10
Payer: COMMERCIAL

## 2024-06-10 ENCOUNTER — PATIENT MESSAGE (OUTPATIENT)
Dept: INTERNAL MEDICINE | Facility: CLINIC | Age: 64
End: 2024-06-10
Payer: COMMERCIAL

## 2024-06-10 ENCOUNTER — OFFICE VISIT (OUTPATIENT)
Dept: DERMATOLOGY | Facility: CLINIC | Age: 64
End: 2024-06-10
Payer: COMMERCIAL

## 2024-06-10 DIAGNOSIS — H53.002 AMBLYOPIA EX ANOPSIA OF LEFT EYE: ICD-10-CM

## 2024-06-10 DIAGNOSIS — H25.13 AGE-RELATED NUCLEAR CATARACT OF BOTH EYES: ICD-10-CM

## 2024-06-10 DIAGNOSIS — L40.9 PSORIASIS: Primary | ICD-10-CM

## 2024-06-10 DIAGNOSIS — G62.9 NEUROPATHY: Primary | ICD-10-CM

## 2024-06-10 DIAGNOSIS — H52.7 REFRACTIVE ERRORS: ICD-10-CM

## 2024-06-10 DIAGNOSIS — H26.9 CORTICAL CATARACT OF BOTH EYES: ICD-10-CM

## 2024-06-10 DIAGNOSIS — E11.3313 MODERATE NONPROLIFERATIVE DIABETIC RETINOPATHY OF BOTH EYES WITH MACULAR EDEMA ASSOCIATED WITH TYPE 2 DIABETES MELLITUS: Primary | ICD-10-CM

## 2024-06-10 PROCEDURE — 99999 PR PBB SHADOW E&M-EST. PATIENT-LVL V: CPT | Mod: PBBFAC,,, | Performed by: OPTOMETRIST

## 2024-06-10 PROCEDURE — 99499 UNLISTED E&M SERVICE: CPT | Mod: S$GLB,,, | Performed by: OPTOMETRIST

## 2024-06-10 PROCEDURE — 99214 OFFICE O/P EST MOD 30 MIN: CPT | Mod: S$GLB,,, | Performed by: DERMATOLOGY

## 2024-06-10 PROCEDURE — 99999 PR PBB SHADOW E&M-EST. PATIENT-LVL III: CPT | Mod: PBBFAC,,, | Performed by: DERMATOLOGY

## 2024-06-10 PROCEDURE — 4010F ACE/ARB THERAPY RXD/TAKEN: CPT | Mod: CPTII,S$GLB,, | Performed by: DERMATOLOGY

## 2024-06-10 PROCEDURE — 3044F HG A1C LEVEL LT 7.0%: CPT | Mod: CPTII,S$GLB,, | Performed by: DERMATOLOGY

## 2024-06-10 RX ORDER — SECUKINUMAB 150 MG/ML
INJECTION SUBCUTANEOUS
Qty: 10 ML | Refills: 0 | Status: ACTIVE | OUTPATIENT
Start: 2024-06-10

## 2024-06-10 RX ORDER — SECUKINUMAB 150 MG/ML
INJECTION SUBCUTANEOUS
Qty: 2 ML | Refills: 6 | Status: ACTIVE | OUTPATIENT
Start: 2024-06-10

## 2024-06-10 NOTE — PROGRESS NOTES
I have seen the patient, reviewed the Resident's progress note. I have personally interviewed and examined the patient at bedside and agree with the findings.     Ruth Moody MD  [facility]

## 2024-06-10 NOTE — PROGRESS NOTES
Subjective:      Patient ID:  Shelley Levy is a 63 y.o. female who presents for   Chief Complaint   Patient presents with    Follow-up     Follow-up - Follow-up  Symptom course: worsening  Affected locations: left knee and right knee  Signs / symptoms: scaling, itching and redness  Severity: moderate to severe and worse at night    Psoriasis    Reports using both clobetasol cream and dovonox cream with no improvement. Now with new areas of involvement on her upper body, scalp, and persistent nail and BLE involvement.     Review of Systems   Constitutional:  Negative for fever.   HENT:  Negative for sore throat.    Gastrointestinal:  Negative for diarrhea.   Musculoskeletal:  Negative for arthralgias.   Skin:  Positive for itching and rash.       Objective:   Physical Exam   Constitutional: She appears well-developed and well-nourished.   Psychiatric: She has a normal mood and affect.   Skin:                       Diagram Legend     Erythematous scaling macule/papule c/w actinic keratosis       Vascular papule c/w angioma      Pigmented verrucoid papule/plaque c/w seborrheic keratosis      Yellow umbilicated papule c/w sebaceous hyperplasia      Irregularly shaped tan macule c/w lentigo     1-2 mm smooth white papules consistent with Milia      Movable subcutaneous cyst with punctum c/w epidermal inclusion cyst      Subcutaneous movable cyst c/w pilar cyst      Firm pink to brown papule c/w dermatofibroma      Pedunculated fleshy papule(s) c/w skin tag(s)      Evenly pigmented macule c/w junctional nevus     Mildly variegated pigmented, slightly irregular-bordered macule c/w mildly atypical nevus      Flesh colored to evenly pigmented papule c/w intradermal nevus       Pink pearly papule/plaque c/w basal cell carcinoma      Erythematous hyperkeratotic cursted plaque c/w SCC      Surgical scar with no sign of skin cancer recurrence      Open and closed comedones      Inflammatory papules and pustules       Verrucoid papule consistent consistent with wart     Erythematous eczematous patches and plaques     Dystrophic onycholytic nail with subungual debris c/w onychomycosis     Umbilicated papule    Erythematous-base heme-crusted tan verrucoid plaque consistent with inflamed seborrheic keratosis     Erythematous Silvery Scaling Plaque c/w Psoriasis     See annotation      Assessment / Plan:        Psoriasis    BSA of 10%, with special site involvement including nails and scalp.  Failed therapy with calcipotriene and topical steroids   Otezla previously denied by insurance  Continue clobetasol and dovenox  Plan to start cosentyx, if approved by insurance company  Quantiferon gold, hep panel negative 9/2023  HIV negative 7/2023    RTC in 3 months

## 2024-06-11 NOTE — PROGRESS NOTES
HPI     follow up             Comments: Mrs. Levy being followed for diabetic retinopathy.  Has been followed by Dr. Tran for moderate nonproliferative diabetic   retinopathy of both eyes, with macular edema associated with type 2   diabetic mellitus.  S/p intravitreal injection x 2 in both eyes.   Dr. Tran has suggested   (perhaps) another injection, but decision deferred to a later date.  Dr. Tran has discusses possible referral for cataract evaluation and   surgery.           Comments    Patient in for progress check.    Being followed for (diagnosis):   Follow up    Date last seen:  05/17/2024     Doctor last seen:  Dr. Tran    Prescribed eye medications(s) using:  No    OTC eye medication(s) using: Thera Tears drops    Signs/symptoms of condition resolved/better/stable/worse?:  Pt states that   she is in to f/u on retina visit and cataract .                       Last edited by Jason Slade OD on 6/10/2024  4:40 PM.            Assessment /Plan     For exam results, see Encounter Report.    1. Moderate nonproliferative diabetic retinopathy of both eyes with macular edema associated with type 2 diabetes mellitus        2. Age-related nuclear cataract of both eyes        3. Cortical cataract of both eyes        4. Amblyopia ex anopsia of left eye        5. Refractive errors                       Type 2 diabetic with recent diagnosis of bilateral non-proliferative diabetic retinopathy, with loss of normal foveal contour per OCT of retina at macula, and decreased best-corrected VA in the right eye.  Long-standing history of amblyopia ex anopsia in the left eye.    Ms. Levy has been followed by Dr. Tran in the retina clinic, and she reports that she has received multiple intravitreal injections.    Prior diagnosis of bilateral nuclear sclerotic/cortical/posterior subcapsular cataract.  She reports that Dr. Tran has discussed possible consult with cataract surgeon for evaluation of need for cataract  surgery.    Ms. Levy in today for follow-up refraction as requested.    Astigmatic refractive error in the right eye, with best-corrected distance visual acuity today of 20/25-1+2 in that eye.  No useable response to attempted refraction in the left eye, presumably secondary to previously noted amblyopia in that eye.    Discussed with Ms. Levy.  Deferred new spectacle lens Rx.  She reports that she has upcoming follow-up appointment with Dr. Tran, and I suggested to her that she discuss with Dr. Tran his prior suggestion for consult with cataract surgeon, in view of the best-corrected visual acuity achieved in the right eye with refraction today.

## 2024-06-11 NOTE — PATIENT INSTRUCTIONS
Type 2 diabetic with recent diagnosis of bilateral non-proliferative diabetic retinopathy, with loss of normal foveal contour per OCT of retina at macula, and decreased best-corrected VA in the right eye.  Long-standing history of amblyopia ex anopsia in the left eye.    Ms. Levy has been followed by Dr. Tran in the retina clinic, and she reports that she has received multiple intravitreal injections.    Prior diagnosis of bilateral nuclear sclerotic/cortical/posterior subcapsular cataract.  She reports that Dr. Tran has discussed possible consult with cataract surgeon for evaluation of need for cataract surgery.    Ms. Levy in today for follow-up refraction as requested.    Astigmatic refractive error in the right eye, with best-corrected distance visual acuity today of 20/25-1+2 in that eye.  No useable response to attempted refraction in the left eye, presumably secondary to previously noted amblyopia in that eye.    Discussed with Ms. Levy.  Deferred new spectacle lens Rx.  She reports that she has upcoming follow-up appointment with Dr. Tran, and I suggested to her that she discuss with Dr. Tran his prior suggestion for consult with cataract surgeon, in view of the best-corrected visual acuity achieved in the right eye with refraction today.

## 2024-06-21 ENCOUNTER — OFFICE VISIT (OUTPATIENT)
Dept: OPHTHALMOLOGY | Facility: CLINIC | Age: 64
End: 2024-06-21
Payer: COMMERCIAL

## 2024-06-21 DIAGNOSIS — H25.13 AGE-RELATED NUCLEAR CATARACT OF BOTH EYES: ICD-10-CM

## 2024-06-21 DIAGNOSIS — H35.033 HYPERTENSIVE RETINOPATHY OF BOTH EYES: ICD-10-CM

## 2024-06-21 DIAGNOSIS — E11.3313 MODERATE NONPROLIFERATIVE DIABETIC RETINOPATHY OF BOTH EYES WITH MACULAR EDEMA ASSOCIATED WITH TYPE 2 DIABETES MELLITUS: Primary | ICD-10-CM

## 2024-06-21 DIAGNOSIS — H43.813 VITREOUS DEGENERATION OF BOTH EYES: ICD-10-CM

## 2024-06-21 PROCEDURE — 99999 PR PBB SHADOW E&M-EST. PATIENT-LVL IV: CPT | Mod: PBBFAC,,, | Performed by: OPHTHALMOLOGY

## 2024-06-21 NOTE — PROGRESS NOTES
HPI    6 wk OCT/DFE OU/poss YVAN OU    DLS 05/17/2024 by Dr. ASH Tran MD    Pt states : my vision is about the same  .     No flashes   Floaters OU   No pain   +dry eye   +irritation   Gtts: Thera Tears BID-QID OU     POHx:  Moderate nonproliferative diabetic retinopathy of both eyes with macular   edema associated with type 2 diabetes mellitus  Vitreous degeneration of both eyes  Hypertensive retinopathy of both eyes  Age-related nuclear cataract of both eyes    Last edited by Marianna Carrero MA on 6/21/2024  1:06 PM.           A/P    ICD-10-CM ICD-9-CM   1. 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 Vitreous degeneration of both eyes  H43.813 379.21   3. Hypertensive retinopathy of both eyes  H35.033 362.11   4. Age-related nuclear cataract of both eyes  H25.13 366.16       1. Moderate nonproliferative diabetic retinopathy of both eyes with macular edema associated with type 2 diabetes mellitus  2. Diabetic macular edema of both eyes  PCP  Corey Hernandez MD - Recent notes reviewed   02/09/2024  5.5  A1C     OD: s/p NEFTALY x2 4/12/24  VA 20/30 (was 20/40), mod DR with DME involving fovea - better   Plan: improved foveal DME, refracts to 20/25 when she saw Dr Slade, monitor for now, recheck in 3-4 mo    OS: s/p NEFTALY x2 4/12/24  VA 20/60 (Was 20/200), hx of amblyopia, better IRF today   Plan: monitor for now, can recheck 3-4 mo if needs repeat Avastin     Visit today is associated with current or anticipated ongoing medical care related to this patients single serious condition/complex condition (diabetic macular edema)     Recommend good blood pressure control, tight blood glucose control, and good cholesterol control     3. Vitreous degeneration of both eyes  No RT/RD  Plan: Observation      4. Hypertensive retinopathy of both eyes  Mild HTN changes  Plan: Observation  Recommend good blood  pressure control, tight blood glucose control, and good cholesterol control     5. Age-related nuclear cataract of both eyes  VS NS  Amblyopia OS  Plan: can update Mrx and see if that helps vision, if not can send for CEIOL    RTC 3-4 mo DFE/OCTm OU, poss Avastin OU        I saw and examined the patient and reviewed in detail the findings documented. The final examination findings, image interpretations which have been independently interpreted, and plan as documented in the record represent my personal judgment and conclusions.    Amari Tran MD  Vitreoretinal Surgery   [facility]

## 2024-06-24 ENCOUNTER — TELEPHONE (OUTPATIENT)
Dept: OPHTHALMOLOGY | Facility: CLINIC | Age: 64
End: 2024-06-24
Payer: COMMERCIAL

## 2024-06-26 ENCOUNTER — HOSPITAL ENCOUNTER (OUTPATIENT)
Dept: WOUND CARE | Facility: HOSPITAL | Age: 64
Discharge: HOME | End: 2024-06-26
Attending: SURGERY
Payer: COMMERCIAL

## 2024-06-26 VITALS
TEMPERATURE: 99 F | BODY MASS INDEX: 34.3 KG/M2 | DIASTOLIC BLOOD PRESSURE: 62 MMHG | SYSTOLIC BLOOD PRESSURE: 124 MMHG | HEIGHT: 57 IN | HEART RATE: 77 BPM | WEIGHT: 159 LBS

## 2024-06-26 DIAGNOSIS — L97.312 ULCER OF RIGHT ANKLE, WITH FAT LAYER EXPOSED: Primary | ICD-10-CM

## 2024-06-26 DIAGNOSIS — E11.43 TYPE 2 DIABETES MELLITUS WITH DIABETIC AUTONOMIC NEUROPATHY, WITH LONG-TERM CURRENT USE OF INSULIN: ICD-10-CM

## 2024-06-26 DIAGNOSIS — Z79.4 TYPE 2 DIABETES MELLITUS WITH DIABETIC AUTONOMIC NEUROPATHY, WITH LONG-TERM CURRENT USE OF INSULIN: ICD-10-CM

## 2024-06-26 PROCEDURE — 99213 OFFICE O/P EST LOW 20 MIN: CPT

## 2024-06-26 NOTE — PROGRESS NOTES
[facility]    Subjective:       Patient ID: Shelley Levy is a 63 y.o. female.    Chief Complaint: Non-healing Wound Follow Up    Follow up right ankle wounds. Measurements improving. New plan of care initiated. Follow up 1 week.     Review of Systems   All other systems reviewed and are negative.        Objective:     Vitals:    06/26/24 1401   BP: 124/62   Pulse: 77   Temp: 98.7 °F (37.1 °C)         Physical Exam       Wound 04/17/24 1400 Venous Ulcer Right medial Ankle (Active)   04/17/24 1400   Present on Original Admission: Y   Primary Wound Type: Venous ulcer   Side: Right   Orientation: medial   Location: Ankle   Wound Approximate Age at First Assessment (Weeks):    Wound Number:    Is this injury device related?:    Incision Type:    Closure Method:    Wound Description (Comments):    Type:    Additional Comments:    Ankle-Brachial Index:    Pulses: doppler   Removal Indication and Assessment:    Wound Outcome:    Wound Image   06/26/24 1430   Dressing Appearance Intact;Moist drainage 06/26/24 1430   Drainage Amount Scant 06/26/24 1430   Drainage Characteristics/Odor Serous 06/26/24 1430   Appearance Intact;Moist 06/26/24 1430   Tissue loss description Partial thickness 06/26/24 1430   Red (%), Wound Tissue Color 100 % 06/26/24 1430   Periwound Area Intact;Dry;Scar tissue 06/26/24 1430   Wound Edges Defined 06/26/24 1430   Wound Length (cm) 0.1 cm 06/26/24 1430   Wound Width (cm) 0.1 cm 06/26/24 1430   Wound Depth (cm) 0.1 cm 06/26/24 1430   Wound Volume (cm^3) 0.001 cm^3 06/26/24 1430   Wound Surface Area (cm^2) 0.01 cm^2 06/26/24 1430   Care Cleansed with:;Sterile normal saline 06/26/24 1430   Dressing Applied;Silver;Island/border;Rolled gauze;Tubular bandage 06/26/24 1430   Periwound Care Absorptive dressing applied;Dry periwound area maintained 06/26/24 1430   Dressing Change Due 06/29/24 06/26/24 1430            Wound 04/17/24 1400 Venous Ulcer  Right lateral Ankle (Active)   04/17/24 1400   Present on Original Admission: Y   Primary Wound Type: Venous ulcer   Side: Right   Orientation: lateral   Location: Ankle   Wound Approximate Age at First Assessment (Weeks):    Wound Number:    Is this injury device related?:    Incision Type:    Closure Method:    Wound Description (Comments):    Type:    Additional Comments:    Ankle-Brachial Index:    Pulses: doppler   Removal Indication and Assessment:    Wound Outcome:    Wound Image   06/26/24 1430   Dressing Appearance Intact;Moist drainage 06/26/24 1430   Drainage Amount Scant 06/26/24 1430   Drainage Characteristics/Odor Serosanguineous 06/26/24 1430   Appearance Intact;Red;Moist 06/26/24 1430   Tissue loss description Full thickness 06/26/24 1430   Red (%), Wound Tissue Color 100 % 06/26/24 1430   Periwound Area Intact;Dry;Scar tissue 06/26/24 1430   Wound Edges Irregular 06/26/24 1430   Wound Length (cm) 0.6 cm 06/26/24 1430   Wound Width (cm) 0.5 cm 06/26/24 1430   Wound Depth (cm) 0.1 cm 06/26/24 1430   Wound Volume (cm^3) 0.03 cm^3 06/26/24 1430   Wound Surface Area (cm^2) 0.3 cm^2 06/26/24 1430   Care Cleansed with:;Sterile normal saline 06/26/24 1430   Dressing Applied;Silver;Island/border;Rolled gauze;Tubular bandage 06/26/24 1430   Periwound Care Absorptive dressing applied;Dry periwound area maintained 06/26/24 1430   Compression Tubular elasticized bandage 06/26/24 1430   Dressing Change Due 06/29/24 06/26/24 1430         Assessment/Plan:         ICD-10-CM ICD-9-CM   1. Ulcer of right ankle, with fat layer exposed  L97.312 707.13   2. Type 2 diabetes mellitus with diabetic autonomic neuropathy, with long-term current use of insulin  E11.43 250.60    Z79.4 337.1     V58.67       Wound is improving. No signs of infection or necrosis.      Tissue pathology and/or culture taken:  [] Yes [x] No   Sharp debridement performed:   [] Yes [x] No   Labs ordered this visit:   [] Yes [x] No   Imaging ordered  this visit:   [] Yes [x] No           Orders Placed This Encounter   Procedures    Change dressing     Right medial ankle, right lateral ankle wounds:   Cleanse wound with: normal saline   Lidocaine:prn   Silver nitrate:prn   Periwound care: moisturizer as needed   Primary dressing: Silver Border Dressing    Secondary dressing:  cast padding, Netting   Edema control: Tubi  F toe to knee. Limb elevation when possible   Frequency:Every 3 days and as needed   Follow-up:1 week         Follow up in about 1 week (around 7/3/2024) for .            This includes face to face time and non-face to face time preparing to see the patient (eg, review of tests), obtaining and/or reviewing separately obtained history, documenting clinical information in the electronic or other health record, independently interpreting results and communicating results to the patient/family/caregiver, or care coordinator.

## 2024-07-10 ENCOUNTER — HOSPITAL ENCOUNTER (OUTPATIENT)
Dept: WOUND CARE | Facility: HOSPITAL | Age: 64
Discharge: HOME | End: 2024-07-10
Attending: SURGERY
Payer: COMMERCIAL

## 2024-07-10 VITALS
WEIGHT: 158.94 LBS | HEART RATE: 82 BPM | SYSTOLIC BLOOD PRESSURE: 109 MMHG | BODY MASS INDEX: 34.29 KG/M2 | TEMPERATURE: 98 F | DIASTOLIC BLOOD PRESSURE: 58 MMHG | HEIGHT: 57 IN

## 2024-07-10 DIAGNOSIS — S81.801D WOUND OF RIGHT LOWER EXTREMITY, SUBSEQUENT ENCOUNTER: ICD-10-CM

## 2024-07-10 DIAGNOSIS — S81.801A WOUND OF RIGHT LEG: Primary | ICD-10-CM

## 2024-07-10 PROCEDURE — 99213 OFFICE O/P EST LOW 20 MIN: CPT

## 2024-07-10 NOTE — PROGRESS NOTES
Wound Care & [facility]    Subjective:       Patient ID: Shelley Levy is a 63 y.o. female.    Chief Complaint: Wound Care    Follow up for right ankle wound .Wound is healed.Will follow up if symptoms worsen or fail to improve.  Review of Systems   Constitutional: Negative.    HENT: Negative.     Eyes: Negative.    Respiratory: Negative.     Cardiovascular: Negative.    Gastrointestinal: Negative.    Genitourinary: Negative.    Musculoskeletal: Negative.    Skin: Negative.    Neurological: Negative.    Psychiatric/Behavioral: Negative.         Objective:     Vitals:    07/10/24 1321   BP: (!) 109/58   Pulse: 82   Temp: 98.4 °F (36.9 °C)         Physical Exam  Constitutional:       Appearance: She is well-developed.   HENT:      Head: Normocephalic.   Eyes:      Conjunctiva/sclera: Conjunctivae normal.      Pupils: Pupils are equal, round, and reactive to light.   Cardiovascular:      Rate and Rhythm: Normal rate and regular rhythm.      Heart sounds: Normal heart sounds.   Pulmonary:      Effort: Pulmonary effort is normal.      Breath sounds: Normal breath sounds.   Abdominal:      General: Bowel sounds are normal.      Palpations: Abdomen is soft.   Musculoskeletal:         General: Normal range of motion.      Cervical back: Normal range of motion and neck supple.   Skin:     General: Skin is warm and dry.   Neurological:      Mental Status: She is alert and oriented to person, place, and time.      Deep Tendon Reflexes: Reflexes are normal and symmetric.        Wound 04/17/24 1400 Venous Ulcer Right medial Ankle (Active)   04/17/24 1400   Present on Original Admission: Y   Primary Wound Type: Venous ulcer   Side: Right   Orientation: medial   Location: Ankle   Wound Approximate Age at First Assessment (Weeks):    Wound Number:    Is this injury device related?:    Incision Type:    Closure Method:    Wound Description (Comments):    Type:    Additional Comments:     Ankle-Brachial Index:    Pulses: doppler   Removal Indication and Assessment:    Wound Outcome:    Wound Image   07/10/24 1319   Dressing Appearance Intact;Dry 07/10/24 1319   Drainage Amount None 07/10/24 1319   Appearance Intact;Pink;Dry 07/10/24 1319   Tissue loss description Full thickness 07/10/24 1319   Red (%), Wound Tissue Color 100 % 07/10/24 1319   Periwound Area Intact;Dry 07/10/24 1319   Wound Edges Rolled/closed 07/10/24 1319   Wound Length (cm) 0 cm 07/10/24 1319   Wound Width (cm) 0 cm 07/10/24 1319   Wound Depth (cm) 0 cm 07/10/24 1319   Wound Volume (cm^3) 0 cm^3 07/10/24 1319   Wound Surface Area (cm^2) 0 cm^2 07/10/24 1319   Care Cleansed with:;Sterile normal saline 07/10/24 1319            Wound 04/17/24 1400 Venous Ulcer Right lateral Ankle (Active)   04/17/24 1400   Present on Original Admission: Y   Primary Wound Type: Venous ulcer   Side: Right   Orientation: lateral   Location: Ankle   Wound Approximate Age at First Assessment (Weeks):    Wound Number:    Is this injury device related?:    Incision Type:    Closure Method:    Wound Description (Comments):    Type:    Additional Comments:    Ankle-Brachial Index:    Pulses: doppler   Removal Indication and Assessment:    Wound Outcome:    Wound Image   07/10/24 1319   Dressing Appearance Intact;Dry 07/10/24 1319   Drainage Amount None 07/10/24 1319   Appearance Intact;Pink;Dry 07/10/24 1319   Tissue loss description Full thickness 07/10/24 1319   Red (%), Wound Tissue Color 100 % 07/10/24 1319   Periwound Area Intact;Dry 07/10/24 1319   Wound Edges Rolled/closed 07/10/24 1319   Wound Length (cm) 0 cm 07/10/24 1319   Wound Width (cm) 0 cm 07/10/24 1319   Wound Depth (cm) 0 cm 07/10/24 1319   Wound Volume (cm^3) 0 cm^3 07/10/24 1319   Wound Surface Area (cm^2) 0 cm^2 07/10/24 1319   Care Cleansed with:;Sterile normal saline 07/10/24 1319         Assessment/Plan:         ICD-10-CM ICD-9-CM   1. Wound of right leg  S81.801A 891.0   2. Wound of  right lower extremity, subsequent encounter  S81.801D V58.89     894.0           Tissue pathology and/or culture taken:  [] Yes [x] No   Sharp debridement performed:   [] Yes [x] No   Labs ordered this visit:   [] Yes [x] No   Imaging ordered this visit:   [] Yes [x] No           Orders Placed This Encounter   Procedures    Change dressing     Healed Wound Instructions:Your wound is healed, it is extremely fragile at this stage; protect it from friction, wash it gently and pat dry.  If the wound should re-open, please call [phone] for further instructions.        Follow up if symptoms worsen or fail to improve.            This includes face to face time and non-face to face time preparing to see the patient (eg, review of tests), obtaining and/or reviewing separately obtained history, documenting clinical information in the electronic or other health record, independently interpreting results and communicating results to the patient/family/caregiver, or care coordinator.

## 2024-07-16 DIAGNOSIS — B35.4 TINEA CORPORIS: ICD-10-CM

## 2024-07-16 DIAGNOSIS — Z79.899 ENCOUNTER FOR LONG-TERM CURRENT USE OF HIGH RISK MEDICATION: ICD-10-CM

## 2024-07-17 DIAGNOSIS — E11.9 TYPE 2 DIABETES MELLITUS WITHOUT COMPLICATION: ICD-10-CM

## 2024-07-17 DIAGNOSIS — E11.65 TYPE 2 DIABETES MELLITUS WITH HYPERGLYCEMIA, WITHOUT LONG-TERM CURRENT USE OF INSULIN: ICD-10-CM

## 2024-07-17 RX ORDER — METFORMIN HYDROCHLORIDE 500 MG/1
1000 TABLET, EXTENDED RELEASE ORAL 2 TIMES DAILY WITH MEALS
Qty: 360 TABLET | Refills: 0 | Status: SHIPPED | OUTPATIENT
Start: 2024-07-17

## 2024-07-17 NOTE — TELEPHONE ENCOUNTER
Provider Staff:  Action required for this patient    Requires labs      Please see care gap opportunities below in Care Due Message.    Thanks!  [facility]     Appointments      Date Provider   Last Visit   5/10/2024 Corey Hernandez MD   Next Visit   8/2/2024 Corey Hernandez MD     Refill Decision Note   Shelley Levy  is requesting a refill authorization.  Brief Assessment and Rationale for Refill:  Approve     Medication Therapy Plan:         Comments:     Note composed:1:02 AM 07/17/2024

## 2024-07-17 NOTE — TELEPHONE ENCOUNTER
Care Due:                  Date            Visit Type   Department     Provider  --------------------------------------------------------------------------------                                EP -                              PRIMARY      [facility] INTERNAL  Last Visit: 05-      CARE ([facility])   MEDICINE       Corey MCLAUGHLIN                              FOLLOWUP/OF  [facility] INTERNAL  Next Visit: 08-      FICE VISIT   MEDICINE       Corey Hernandez                                                            Last  Test          Frequency    Reason                     Performed    Due Date  --------------------------------------------------------------------------------    HBA1C.......  6 months...  insulin, metFORMIN.......  02-   08-    Lipid Panel.  12 months..  pravastatin..............  07- 07-    [employer] Embedded Care Due Messages. Reference number: [id number].   7/17/2024 12:10:29 AM CDT

## 2024-07-19 RX ORDER — KETOCONAZOLE 20 MG/G
CREAM TOPICAL 2 TIMES DAILY
Qty: 60 G | Refills: 11 | Status: SHIPPED | OUTPATIENT
Start: 2024-07-19

## 2024-07-26 ENCOUNTER — PATIENT MESSAGE (OUTPATIENT)
Dept: PODIATRY | Facility: CLINIC | Age: 64
End: 2024-07-26
Payer: COMMERCIAL

## 2024-07-29 ENCOUNTER — OFFICE VISIT (OUTPATIENT)
Dept: PODIATRY | Facility: CLINIC | Age: 64
End: 2024-07-29
Payer: COMMERCIAL

## 2024-07-29 VITALS
DIASTOLIC BLOOD PRESSURE: 59 MMHG | HEIGHT: 57 IN | WEIGHT: 163.13 LBS | BODY MASS INDEX: 35.19 KG/M2 | HEART RATE: 82 BPM | SYSTOLIC BLOOD PRESSURE: 124 MMHG

## 2024-07-29 DIAGNOSIS — B35.1 ONYCHOMYCOSIS: ICD-10-CM

## 2024-07-29 DIAGNOSIS — R60.0 BILATERAL LEG EDEMA: Primary | ICD-10-CM

## 2024-07-29 DIAGNOSIS — L84 CORN OR CALLUS: ICD-10-CM

## 2024-07-29 DIAGNOSIS — E11.65 TYPE 2 DIABETES MELLITUS WITH HYPERGLYCEMIA, WITHOUT LONG-TERM CURRENT USE OF INSULIN: ICD-10-CM

## 2024-07-29 PROCEDURE — 1159F MED LIST DOCD IN RCRD: CPT | Mod: CPTII,S$GLB,, | Performed by: PODIATRIST

## 2024-07-29 PROCEDURE — 11056 PARNG/CUTG B9 HYPRKR LES 2-4: CPT | Mod: S$GLB,,, | Performed by: PODIATRIST

## 2024-07-29 PROCEDURE — 3044F HG A1C LEVEL LT 7.0%: CPT | Mod: CPTII,S$GLB,, | Performed by: PODIATRIST

## 2024-07-29 PROCEDURE — 99999 PR PBB SHADOW E&M-EST. PATIENT-LVL V: CPT | Mod: PBBFAC,,, | Performed by: PODIATRIST

## 2024-07-29 PROCEDURE — 3074F SYST BP LT 130 MM HG: CPT | Mod: CPTII,S$GLB,, | Performed by: PODIATRIST

## 2024-07-29 PROCEDURE — 4010F ACE/ARB THERAPY RXD/TAKEN: CPT | Mod: CPTII,S$GLB,, | Performed by: PODIATRIST

## 2024-07-29 PROCEDURE — 11721 DEBRIDE NAIL 6 OR MORE: CPT | Mod: 59,S$GLB,, | Performed by: PODIATRIST

## 2024-07-29 PROCEDURE — 99213 OFFICE O/P EST LOW 20 MIN: CPT | Mod: 25,S$GLB,, | Performed by: PODIATRIST

## 2024-07-29 PROCEDURE — 3078F DIAST BP <80 MM HG: CPT | Mod: CPTII,S$GLB,, | Performed by: PODIATRIST

## 2024-07-29 PROCEDURE — 3008F BODY MASS INDEX DOCD: CPT | Mod: CPTII,S$GLB,, | Performed by: PODIATRIST

## 2024-07-31 DIAGNOSIS — E11.9 TYPE 2 DIABETES MELLITUS WITHOUT COMPLICATION: ICD-10-CM

## 2024-07-31 NOTE — PROGRESS NOTES
Subjective:      Patient ID: Shelley Levy is a 63 y.o. female.    Chief Complaint:   Wound Check (Right ankle ) and Diabetes Mellitus (PCP-Corey Hernandez MD-5/10/2024//)    Shelley is a 63 y.o. female who presents to the clinic complaining of thick and discolored toenails on both feet. Shelley is inquiring about treatment options.  Addition issues with neuropathy symptoms.  Here for routine diabetic foot evaluation and foot care as well as issues with bilateral lower extremity edema.    Review of Systems   Constitutional: Negative for chills, decreased appetite, fever and malaise/fatigue.   HENT:  Negative for congestion, hearing loss, nosebleeds and tinnitus.    Eyes:  Negative for double vision, pain, photophobia and visual disturbance.   Cardiovascular:  Negative for chest pain, claudication, cyanosis and leg swelling.   Respiratory:  Negative for cough, hemoptysis, shortness of breath and wheezing.    Endocrine: Negative for cold intolerance and heat intolerance.   Hematologic/Lymphatic: Negative for adenopathy and bleeding problem.   Skin:  Positive for color change and nail changes. Negative for dry skin, itching and suspicious lesions.   Musculoskeletal:  Positive for arthritis. Negative for joint pain, myalgias and stiffness.   Gastrointestinal:  Negative for abdominal pain, jaundice, nausea and vomiting.   Genitourinary:  Negative for dysuria, frequency and hematuria.   Neurological:  Negative for difficulty with concentration, loss of balance, numbness, paresthesias and sensory change.   Psychiatric/Behavioral:  Negative for altered mental status, hallucinations and suicidal ideas. The patient is not nervous/anxious.    Allergic/Immunologic: Negative for environmental allergies and persistent infections.           Objective:      Physical Exam  Vitals reviewed.   Constitutional:       Appearance: She is well-developed.   HENT:      Head: Normocephalic and atraumatic.   Cardiovascular:      Pulses:            Dorsalis pedis pulses are 2+ on the right side and 2+ on the left side.        Posterior tibial pulses are 2+ on the right side and 2+ on the left side.   Pulmonary:      Effort: Pulmonary effort is normal.   Musculoskeletal:         General: Normal range of motion.      Comments: Inspection and palpation of the muscles joints and bones of both lower extremities reveal that muscle strength for the anterior lateral and posterior muscle groups and intrinsic muscle groups of the foot are all 5 over 5 symmetrical.  Ankle subtalar midtarsal and digital joint range of motion are within normal limits, nonpainful, without crepitus or effusion.  Patient exhibits a normal angle and base of gait.  Palpation of the tendons reveal no defects.   Skin:     General: Skin is warm and dry.      Capillary Refill: Capillary refill takes 2 to 3 seconds.      Comments: Skin turgor is normal bilaterally.  Skin texture is well hydrated to both lower extremities.    Bilateral hyperkeratotic lesions noted sub 1st MPJ.    Thickened, discolored  toenails 1-5 with subungual debris  Partial-thickness wound/excoriation right medial ankle without deep infection/purulence noted.  No subcutaneous tissue involvement noted.   Neurological:      Mental Status: She is alert and oriented to person, place, and time.      Comments: Sharp dull light touch vibratory proprioceptive sensation are intact bilaterally.  Deep tendon reflexes to patellar and Achilles tendon are symmetrical 2 over 4 bilaterally.  No ankle clonus or Babinski reflexes noted bilaterally.  Coordination is normal to both feet and lower extremities.   Psychiatric:         Behavior: Behavior normal.             Assessment:       Encounter Diagnoses   Name Primary?    Bilateral leg edema Yes    Type 2 diabetes mellitus with hyperglycemia, without long-term current use of insulin     Onychomycosis     Corn or callus      Independent visualization of imaging was performed.  Results were  reviewed in detail with patient.       Plan:       Shelley was seen today for wound check and diabetes mellitus.    Diagnoses and all orders for this visit:    Bilateral leg edema  -     COMPRESSION STOCKINGS    Type 2 diabetes mellitus with hyperglycemia, without long-term current use of insulin  -     COMPRESSION STOCKINGS    Onychomycosis    Corn or callus      I counseled the patient on her conditions, their implications and medical management.    The nature of the condition, options for management, as well as potential risks and complications were discussed in detail with patient. Patient was amenable to my recommendations and left my office fully informed and will follow up as instructed or sooner if necessary.      Decision making:  Chronic illnesses discussed in detail, previous records/notes and imaging independently reviewed, prescription drug management performed in addition to lengthy discussion regarding both conservative and surgical treatment options.    Routine Foot Care    Performed by:  Yossi Prado. DPM  Authorized by:  Patient     Consent Done?:  Yes (Verbal)     Nail Care Type:  Debride  Location(s): All  (Left 1st Toe, Left 3rd Toe, Left 2nd Toe, Left 4th Toe, Left 5th Toe, Right 1st Toe, Right 2nd Toe, Right 3rd Toe, Right 4th Toe and Right 5th Toe)  Patient tolerance:  Patient tolerated the procedure well with no immediate complications     With patient's permission, the toenails mentioned above were aggressively reduced and debrided using a nail nipper, removing all offending nail and debris. The patient will continue to monitor the areas daily, inspect the feet, wear protective shoe gear when ambulatory, and moisturizer to maintain skin integrity.      Callus Care Type: Debride    With patient's permission, the calluses/hyperkeratotic lesions mentioned above were aggressively reduced and debrided using a number 15 blade. The patient will continue to monitor the areas daily, inspect the  feet, wear protective shoe gear when ambulatory, and moisturizer to maintain skin integrity.     Discussed etiology of swelling at length. Discussed treatment options including but not limited to :  low sodium diet, elevation of limbs, and compression stockings. Pt will elevate limbs daily.     The nature of the condition, options for management, as well as potential risks and complications were discussed in detail with patient. Patient was amenable to my recommendations and left my office fully informed and will follow up as instructed or sooner if necessary.      Shoe inspection. Diabetic Foot Education. Patient reminded of the importance of good nutrition and blood sugar control to help prevent podiatric complications of diabetes. Patient instructed on proper foot hygeine. We discussed wearing proper shoe gear, daily foot inspections and Diabetic foot education in detail.    Refer to wound clinic.

## 2024-08-01 ENCOUNTER — PATIENT MESSAGE (OUTPATIENT)
Dept: INTERNAL MEDICINE | Facility: CLINIC | Age: 64
End: 2024-08-01
Payer: COMMERCIAL

## 2024-08-01 DIAGNOSIS — R60.0 BILATERAL LOWER EXTREMITY EDEMA: ICD-10-CM

## 2024-08-02 RX ORDER — FUROSEMIDE 20 MG/1
20 TABLET ORAL DAILY
Qty: 20 TABLET | Refills: 0 | Status: SHIPPED | OUTPATIENT
Start: 2024-08-02 | End: 2024-08-22

## 2024-08-02 NOTE — TELEPHONE ENCOUNTER
Care Due:                  Date            Visit Type   Department     Provider  --------------------------------------------------------------------------------                                EP -                              PRIMARY      [facility] INTERNAL  Last Visit: 05-      CARE ([facility])   ARRON Hernandez                              EP -                              PRIMARY      [facility] INTERNAL  Next Visit: 08-      CARE ([facility])   ARRON Hernandez                                                            Last  Test          Frequency    Reason                     Performed    Due Date  --------------------------------------------------------------------------------    CMP.........  12 months..  pravastatin..............  10-   10-    [employer] Embedded Care Due Messages. Reference number: [id number].   8/02/2024 10:43:27 AM CDT

## 2024-08-02 NOTE — TELEPHONE ENCOUNTER
Refill Routing Note   Medication(s) are not appropriate for processing by [facility] for the following reason(s):        Due for refill >6 months ago  No active prescription written by provider    [facility] action(s):  Defer     Requires labs : Yes      Medication Therapy Plan: End Date: 06/21/24 after 20 doses      Appointments  past 12m or future 3m with PCP    Date Provider   Last Visit   5/10/2024 Corey Hernandez MD   Next Visit   8/23/2024 Corey Hernandez MD   ED visits in past 90 days: 0        Note composed:10:50 AM 08/02/2024

## 2024-08-06 ENCOUNTER — PATIENT MESSAGE (OUTPATIENT)
Dept: INTERNAL MEDICINE | Facility: CLINIC | Age: 64
End: 2024-08-06
Payer: COMMERCIAL

## 2024-08-06 DIAGNOSIS — E11.65 TYPE 2 DIABETES MELLITUS WITH HYPERGLYCEMIA, WITHOUT LONG-TERM CURRENT USE OF INSULIN: Primary | ICD-10-CM

## 2024-08-13 DIAGNOSIS — E11.65 TYPE 2 DIABETES MELLITUS WITH HYPERGLYCEMIA, WITHOUT LONG-TERM CURRENT USE OF INSULIN: ICD-10-CM

## 2024-08-13 RX ORDER — INSULIN GLARGINE 100 [IU]/ML
INJECTION, SOLUTION SUBCUTANEOUS
Refills: 0 | OUTPATIENT
Start: 2024-08-13 | End: 2025-08-13

## 2024-08-13 RX ORDER — INSULIN GLARGINE 100 [IU]/ML
10 INJECTION, SOLUTION SUBCUTANEOUS NIGHTLY
Qty: 15 ML | Refills: 3 | Status: SHIPPED | OUTPATIENT
Start: 2024-08-13 | End: 2026-04-05

## 2024-08-13 NOTE — TELEPHONE ENCOUNTER
No care due was identified.  [facility] Embedded Care Due Messages. Reference number: [id number].   8/13/2024 11:46:44 AM CDT

## 2024-08-13 NOTE — TELEPHONE ENCOUNTER
Refill Routing Note   Medication(s) are not appropriate for processing by [facility] for the following reason(s):     DDI not previously overridden by current provider--after initial override, the [facility]ill Center will be able to continue overrides      Med affordability    [facility] action(s):  Defer      Medication Therapy Plan: lantus not covered. please consider ins prefered alternatives        Appointments  past 12m or future 3m with PCP    Date Provider   Last Visit   5/10/2024 Corey Hernandez MD   Next Visit   8/23/2024 Corey Hernandez MD   ED visits in past 90 days: 0        Note composed:4:37 PM 08/13/2024

## 2024-08-14 NOTE — TELEPHONE ENCOUNTER
Refill Decision Note   Shelley Levy  is requesting a refill authorization.  Brief Assessment and Rationale for Refill:  Quick Discontinue     Medication Therapy Plan: Pharmacy requesting for alternative to Lantus but Lantus is listed as alternative.      Comments:     Note composed:7:45 PM 08/13/2024

## 2024-08-15 ENCOUNTER — TELEPHONE (OUTPATIENT)
Dept: INTERNAL MEDICINE | Facility: CLINIC | Age: 64
End: 2024-08-15
Payer: COMMERCIAL

## 2024-08-15 ENCOUNTER — PATIENT MESSAGE (OUTPATIENT)
Dept: INTERNAL MEDICINE | Facility: CLINIC | Age: 64
End: 2024-08-15
Payer: COMMERCIAL

## 2024-08-15 DIAGNOSIS — E11.65 TYPE 2 DIABETES MELLITUS WITH HYPERGLYCEMIA, WITHOUT LONG-TERM CURRENT USE OF INSULIN: ICD-10-CM

## 2024-08-15 DIAGNOSIS — E78.5 DYSLIPIDEMIA: ICD-10-CM

## 2024-08-15 NOTE — TELEPHONE ENCOUNTER
Called pt; pt answered    Scheduled pt with ILIANA Lira for tomorrow     Pt verbalized understanding

## 2024-08-15 NOTE — TELEPHONE ENCOUNTER
No care due was identified.  [facility] Embedded Care Due Messages. Reference number: [id number].   8/15/2024 6:41:37 PM CDT

## 2024-08-15 NOTE — TELEPHONE ENCOUNTER
----- Message from Didi Blunt sent at 8/15/2024  2:02 PM CDT -----  Contact: [phone] Patient  Caller is requesting an earlier appointment then we can schedule.  Caller is requesting a message be sent to the provider.    If this is for urgent care symptoms, did you offer other providers at this location, providers at other locations, or [facility]? (yes, no, n/a):  yes - pt denied    If this is for the patients physical, did you offer to schedule next available and put on wait list, or to see NP or PA for their physical?  (yes, no, n/a):  yes - pt denied    When is the next available appointment with their provider:  08/19/2024    Reason for the appointment:  ongoing cold and flu symptoms    Patient preference of timeframe to be scheduled:  soonest available    Would the patient like a call back, or a response through their [facility] portal?:   call back    Comments:

## 2024-08-15 NOTE — TELEPHONE ENCOUNTER
Called pt; pt did not answer     Left message asking pt to call back to discuss cold  Pt should schedule an appt to be seen by a provider to address symptoms

## 2024-08-16 ENCOUNTER — OFFICE VISIT (OUTPATIENT)
Dept: INTERNAL MEDICINE | Facility: CLINIC | Age: 64
End: 2024-08-16
Payer: COMMERCIAL

## 2024-08-16 DIAGNOSIS — J32.9 RHINOSINUSITIS: ICD-10-CM

## 2024-08-16 DIAGNOSIS — E11.42 TYPE 2 DIABETES MELLITUS WITH DIABETIC POLYNEUROPATHY, WITH LONG-TERM CURRENT USE OF INSULIN: ICD-10-CM

## 2024-08-16 DIAGNOSIS — U07.1 COVID-19: Primary | ICD-10-CM

## 2024-08-16 DIAGNOSIS — Z79.4 TYPE 2 DIABETES MELLITUS WITH DIABETIC POLYNEUROPATHY, WITH LONG-TERM CURRENT USE OF INSULIN: ICD-10-CM

## 2024-08-16 LAB
CTP QC/QA: YES
SARS-COV-2 RDRP RESP QL NAA+PROBE: POSITIVE

## 2024-08-16 PROCEDURE — 1159F MED LIST DOCD IN RCRD: CPT | Mod: CPTII,S$GLB,, | Performed by: PHYSICIAN ASSISTANT

## 2024-08-16 PROCEDURE — 4010F ACE/ARB THERAPY RXD/TAKEN: CPT | Mod: CPTII,S$GLB,, | Performed by: PHYSICIAN ASSISTANT

## 2024-08-16 PROCEDURE — 3044F HG A1C LEVEL LT 7.0%: CPT | Mod: CPTII,S$GLB,, | Performed by: PHYSICIAN ASSISTANT

## 2024-08-16 PROCEDURE — 1160F RVW MEDS BY RX/DR IN RCRD: CPT | Mod: CPTII,S$GLB,, | Performed by: PHYSICIAN ASSISTANT

## 2024-08-16 PROCEDURE — 3074F SYST BP LT 130 MM HG: CPT | Mod: CPTII,S$GLB,, | Performed by: PHYSICIAN ASSISTANT

## 2024-08-16 PROCEDURE — 87635 SARS-COV-2 COVID-19 AMP PRB: CPT | Mod: QW,S$GLB,, | Performed by: PHYSICIAN ASSISTANT

## 2024-08-16 PROCEDURE — 3008F BODY MASS INDEX DOCD: CPT | Mod: CPTII,S$GLB,, | Performed by: PHYSICIAN ASSISTANT

## 2024-08-16 PROCEDURE — 99999 PR PBB SHADOW E&M-EST. PATIENT-LVL V: CPT | Mod: PBBFAC,,, | Performed by: PHYSICIAN ASSISTANT

## 2024-08-16 PROCEDURE — 99213 OFFICE O/P EST LOW 20 MIN: CPT | Mod: S$GLB,,, | Performed by: PHYSICIAN ASSISTANT

## 2024-08-16 PROCEDURE — 3078F DIAST BP <80 MM HG: CPT | Mod: CPTII,S$GLB,, | Performed by: PHYSICIAN ASSISTANT

## 2024-08-16 RX ORDER — FLUTICASONE PROPIONATE 50 MCG
2 SPRAY, SUSPENSION (ML) NASAL DAILY
Qty: 16 G | Refills: 0 | Status: SHIPPED | OUTPATIENT
Start: 2024-08-16

## 2024-08-16 RX ORDER — AMOXICILLIN AND CLAVULANATE POTASSIUM 875; 125 MG/1; MG/1
1 TABLET, FILM COATED ORAL EVERY 12 HOURS
Qty: 20 TABLET | Refills: 0 | Status: SHIPPED | OUTPATIENT
Start: 2024-08-16 | End: 2024-08-26

## 2024-08-16 RX ORDER — LEVOCETIRIZINE DIHYDROCHLORIDE 5 MG/1
5 TABLET, FILM COATED ORAL NIGHTLY
Qty: 30 TABLET | Refills: 0 | Status: SHIPPED | OUTPATIENT
Start: 2024-08-16 | End: 2025-08-16

## 2024-08-16 NOTE — PROGRESS NOTES
"Subjective     Patient ID: Shelley Levy is a 63 y.o. female.    Chief Complaint: Nasal Congestion (4 days, getting worse, fever)    HPI    Established pt of Corey Hernandez MD (new to me)    Same day/urgent care appt.   Onset of symptoms 4 days ago,      Cough(productive, drains to her throat), nasal congestion (blood streaks), hoarse, fever 101.5F (TMAX 2 days ago). runny nose, ears full, facial/sinus pain  Taking tylenol, Sudafed and afrin      Past Medical History:   Diagnosis Date    Type 2 diabetes mellitus with neurologic complication 9/5/2023     Social History     Tobacco Use    Smoking status: Never    Smokeless tobacco: Never   Substance Use Topics    Alcohol use: No    Drug use: Never     Review of patient's allergies indicates:  No Known Allergies    Review of Systems   Constitutional:  Positive for fever. Negative for chills and unexpected weight change.   HENT:  Positive for nasal congestion.    Respiratory:  Positive for cough. Negative for shortness of breath and wheezing.    Cardiovascular:  Negative for chest pain and leg swelling.   Gastrointestinal:  Negative for abdominal pain, nausea and vomiting.   Integumentary:  Negative for rash.   Neurological:  Negative for weakness, light-headedness and headaches.          Objective  BP (!) 118/58 (BP Location: Left arm, Patient Position: Sitting, BP Method: Medium (Manual))   Pulse 75   Ht 4' 9" (1.448 m)   Wt 73.6 kg (162 lb 4.1 oz)   SpO2 96%   BMI 35.11 kg/m²       Physical Exam  Constitutional:       General: She is not in acute distress.     Appearance: She is not ill-appearing.   HENT:      Right Ear: Tympanic membrane, ear canal and external ear normal.      Left Ear: Tympanic membrane, ear canal and external ear normal.      Nose: Congestion present.      Right Sinus: Maxillary sinus tenderness present.      Left Sinus: Maxillary sinus tenderness present.      Mouth/Throat:      Mouth: Mucous membranes are moist.      Pharynx: Oropharynx " is clear.   Eyes:      Conjunctiva/sclera: Conjunctivae normal.   Cardiovascular:      Rate and Rhythm: Normal rate and regular rhythm.   Pulmonary:      Effort: Pulmonary effort is normal. No respiratory distress.   Musculoskeletal:      Right lower leg: No edema.      Left lower leg: No edema.   Lymphadenopathy:      Cervical: No cervical adenopathy.   Skin:     Findings: No rash.            Assessment and Plan     1. COVID-19  -     POCT COVID-19 Rapid Screening  -     fluticasone propionate (FLONASE) 50 mcg/actuation nasal spray; 2 sprays (100 mcg total) by Each Nostril route once daily.  Dispense: 16 g; Refill: 0  -     levocetirizine (XYZAL) 5 MG tablet; Take 1 tablet (5 mg total) by mouth every evening.  Dispense: 30 tablet; Refill: 0  -     amoxicillin-clavulanate 875-125mg (AUGMENTIN) 875-125 mg per tablet; Take 1 tablet by mouth every 12 (twelve) hours. for 10 days  Dispense: 20 tablet; Refill: 0    2. Rhinosinusitis  -     fluticasone propionate (FLONASE) 50 mcg/actuation nasal spray; 2 sprays (100 mcg total) by Each Nostril route once daily.  Dispense: 16 g; Refill: 0  -     levocetirizine (XYZAL) 5 MG tablet; Take 1 tablet (5 mg total) by mouth every evening.  Dispense: 30 tablet; Refill: 0  -     amoxicillin-clavulanate 875-125mg (AUGMENTIN) 875-125 mg per tablet; Take 1 tablet by mouth every 12 (twelve) hours. for 10 days  Dispense: 20 tablet; Refill: 0    3. Type 2 diabetes mellitus with diabetic polyneuropathy, with long-term current use of insulin  Lab Results   Component Value Date    HGBA1C 5.5 02/09/2024   At goal, continue lantus 10u      POC COVID positive  Symptomatic care discussed  Masking and CDC guidelines reviewed  Advised on d/c use of afrin  Notify clinic if symptoms worsen or fail to improve    Marianna Lira PA-C

## 2024-08-16 NOTE — TELEPHONE ENCOUNTER
Refill Routing Note   Medication(s) are not appropriate for processing by [facility] for the following reason(s):        Required labs outdated    [facility] action(s):  Defer               Appointments  past 12m or future 3m with PCP    Date Provider   Last Visit   5/10/2024 Corey Hernandez MD   Next Visit   8/23/2024 Corey Hernandez MD   ED visits in past 90 days: 0        Note composed:12:39 AM 08/16/2024

## 2024-08-16 NOTE — PATIENT INSTRUCTIONS
Hydrate hydrate     Warm salt gargles    Tylenol/ibuprofen as needed.     Sinus and allergy med to pharmacy    Along with Augmentin. (Antibitoic)    Monitor temp.     Must wear a mask for 10 days from the onset of symptoms.

## 2024-08-19 VITALS
HEART RATE: 75 BPM | HEIGHT: 57 IN | SYSTOLIC BLOOD PRESSURE: 118 MMHG | BODY MASS INDEX: 35.01 KG/M2 | TEMPERATURE: 100 F | WEIGHT: 162.25 LBS | DIASTOLIC BLOOD PRESSURE: 58 MMHG | OXYGEN SATURATION: 96 %

## 2024-08-19 RX ORDER — PRAVASTATIN SODIUM 20 MG/1
20 TABLET ORAL
Qty: 90 TABLET | Refills: 0 | Status: SHIPPED | OUTPATIENT
Start: 2024-08-19

## 2024-08-22 ENCOUNTER — LAB VISIT (OUTPATIENT)
Dept: LAB | Facility: HOSPITAL | Age: 64
End: 2024-08-22
Attending: INTERNAL MEDICINE
Payer: COMMERCIAL

## 2024-08-22 DIAGNOSIS — E11.9 TYPE 2 DIABETES MELLITUS WITHOUT COMPLICATION: ICD-10-CM

## 2024-08-22 LAB
ALBUMIN/CREAT UR: 20.4 UG/MG (ref 0–30)
CHOLEST SERPL-MCNC: 152 MG/DL (ref 120–199)
CHOLEST/HDLC SERPL: 4.8 {RATIO} (ref 2–5)
CREAT UR-MCNC: 201 MG/DL (ref 15–325)
HDLC SERPL-MCNC: 32 MG/DL (ref 40–75)
HDLC SERPL: 21.1 % (ref 20–50)
LDLC SERPL CALC-MCNC: 98 MG/DL (ref 63–159)
MICROALBUMIN UR DL<=1MG/L-MCNC: 41 UG/ML
NONHDLC SERPL-MCNC: 120 MG/DL
TRIGL SERPL-MCNC: 110 MG/DL (ref 30–150)

## 2024-08-22 PROCEDURE — 82570 ASSAY OF URINE CREATININE: CPT | Performed by: INTERNAL MEDICINE

## 2024-08-22 PROCEDURE — 36415 COLL VENOUS BLD VENIPUNCTURE: CPT | Mod: PO | Performed by: INTERNAL MEDICINE

## 2024-08-22 PROCEDURE — 80061 LIPID PANEL: CPT | Performed by: INTERNAL MEDICINE

## 2024-08-23 ENCOUNTER — OFFICE VISIT (OUTPATIENT)
Dept: INTERNAL MEDICINE | Facility: CLINIC | Age: 64
End: 2024-08-23
Payer: COMMERCIAL

## 2024-08-23 ENCOUNTER — HOSPITAL ENCOUNTER (OUTPATIENT)
Dept: RADIOLOGY | Facility: HOSPITAL | Age: 64
Discharge: HOME | End: 2024-08-23
Attending: INTERNAL MEDICINE
Payer: COMMERCIAL

## 2024-08-23 VITALS
DIASTOLIC BLOOD PRESSURE: 80 MMHG | HEIGHT: 57 IN | OXYGEN SATURATION: 96 % | BODY MASS INDEX: 34.63 KG/M2 | SYSTOLIC BLOOD PRESSURE: 134 MMHG | WEIGHT: 160.5 LBS | HEART RATE: 96 BPM

## 2024-08-23 DIAGNOSIS — M54.2 CHRONIC NECK PAIN: ICD-10-CM

## 2024-08-23 DIAGNOSIS — M62.89 MUSCLE TIGHTNESS: ICD-10-CM

## 2024-08-23 DIAGNOSIS — M25.562 BILATERAL CHRONIC KNEE PAIN: ICD-10-CM

## 2024-08-23 DIAGNOSIS — R22.42 MASS OF LEFT THIGH: ICD-10-CM

## 2024-08-23 DIAGNOSIS — G89.29 BILATERAL CHRONIC KNEE PAIN: ICD-10-CM

## 2024-08-23 DIAGNOSIS — Z12.11 SCREENING FOR COLON CANCER: ICD-10-CM

## 2024-08-23 DIAGNOSIS — G89.29 CHRONIC NECK PAIN: ICD-10-CM

## 2024-08-23 DIAGNOSIS — Z79.4 TYPE 2 DIABETES MELLITUS WITH DIABETIC POLYNEUROPATHY, WITH LONG-TERM CURRENT USE OF INSULIN: ICD-10-CM

## 2024-08-23 DIAGNOSIS — M25.452 SWELLING OF JOINT OF PELVIC REGION OR THIGH, LEFT: ICD-10-CM

## 2024-08-23 DIAGNOSIS — Z12.31 BREAST CANCER SCREENING BY MAMMOGRAM: ICD-10-CM

## 2024-08-23 DIAGNOSIS — Z09 FOLLOW-UP EXAM: Primary | ICD-10-CM

## 2024-08-23 DIAGNOSIS — M54.12 CERVICAL RADICULOPATHY: ICD-10-CM

## 2024-08-23 DIAGNOSIS — E11.42 TYPE 2 DIABETES MELLITUS WITH DIABETIC POLYNEUROPATHY, WITH LONG-TERM CURRENT USE OF INSULIN: ICD-10-CM

## 2024-08-23 DIAGNOSIS — M25.561 BILATERAL CHRONIC KNEE PAIN: ICD-10-CM

## 2024-08-23 PROCEDURE — 72040 X-RAY EXAM NECK SPINE 2-3 VW: CPT | Mod: TC

## 2024-08-23 PROCEDURE — 3044F HG A1C LEVEL LT 7.0%: CPT | Mod: CPTII,S$GLB,, | Performed by: INTERNAL MEDICINE

## 2024-08-23 PROCEDURE — 3061F NEG MICROALBUMINURIA REV: CPT | Mod: CPTII,S$GLB,, | Performed by: INTERNAL MEDICINE

## 2024-08-23 PROCEDURE — 3066F NEPHROPATHY DOC TX: CPT | Mod: CPTII,S$GLB,, | Performed by: INTERNAL MEDICINE

## 2024-08-23 PROCEDURE — 99999 PR PBB SHADOW E&M-EST. PATIENT-LVL V: CPT | Mod: PBBFAC,,, | Performed by: INTERNAL MEDICINE

## 2024-08-23 PROCEDURE — 4010F ACE/ARB THERAPY RXD/TAKEN: CPT | Mod: CPTII,S$GLB,, | Performed by: INTERNAL MEDICINE

## 2024-08-23 PROCEDURE — 3075F SYST BP GE 130 - 139MM HG: CPT | Mod: CPTII,S$GLB,, | Performed by: INTERNAL MEDICINE

## 2024-08-23 PROCEDURE — 99214 OFFICE O/P EST MOD 30 MIN: CPT | Mod: S$GLB,,, | Performed by: INTERNAL MEDICINE

## 2024-08-23 PROCEDURE — 72040 X-RAY EXAM NECK SPINE 2-3 VW: CPT | Mod: 26,,, | Performed by: RADIOLOGY

## 2024-08-23 PROCEDURE — 3008F BODY MASS INDEX DOCD: CPT | Mod: CPTII,S$GLB,, | Performed by: INTERNAL MEDICINE

## 2024-08-23 PROCEDURE — 3079F DIAST BP 80-89 MM HG: CPT | Mod: CPTII,S$GLB,, | Performed by: INTERNAL MEDICINE

## 2024-08-23 PROCEDURE — 1159F MED LIST DOCD IN RCRD: CPT | Mod: CPTII,S$GLB,, | Performed by: INTERNAL MEDICINE

## 2024-08-23 RX ORDER — TIZANIDINE 2 MG/1
4 TABLET ORAL EVERY 8 HOURS PRN
Qty: 30 TABLET | Refills: 3 | Status: SHIPPED | OUTPATIENT
Start: 2024-08-23 | End: 2024-09-12

## 2024-08-23 NOTE — PROGRESS NOTES
Subjective:       Patient ID: Shelley Levy is a 63 y.o. female.    Chief Complaint: Follow-up      HPI  Shelley Levy is a 63 y.o. year old female with t2DM, peripheral polyneuropathy, psoriasis presents for follow up. Complains of L thigh pain, tightness, on going for a few weeks. Has been having difficulty ambulating, with wide based gait. Complaints with bilateral knee pain as well, but R>L.     Review of Systems   Constitutional:  Negative for activity change, appetite change, fatigue, fever and unexpected weight change.   HENT:  Negative for congestion, hearing loss, postnasal drip, sneezing, sore throat, trouble swallowing and voice change.    Eyes:  Negative for pain and discharge.   Respiratory:  Negative for cough, choking, chest tightness, shortness of breath and wheezing.    Cardiovascular:  Negative for chest pain, palpitations and leg swelling.   Gastrointestinal:  Negative for abdominal distention, abdominal pain, blood in stool, constipation, diarrhea, nausea and vomiting.   Endocrine: Negative for polydipsia and polyuria.   Genitourinary:  Negative for difficulty urinating and flank pain.   Musculoskeletal:  Positive for arthralgias and myalgias. Negative for back pain, joint swelling and neck pain.   Skin:  Negative for rash.   Neurological:  Negative for dizziness, tremors, seizures, weakness, numbness and headaches.   Psychiatric/Behavioral:  Negative for agitation. The patient is not nervous/anxious.          Past Medical History:   Diagnosis Date    Type 2 diabetes mellitus with neurologic complication 9/5/2023        Prior to Admission medications    Medication Sig Start Date End Date Taking? Authorizing Provider   acetaminophen-codeine 300-30mg (TYLENOL #3) 300-30 mg Tab take 1 tablet by mouth every 4 hours 5/15/24  Yes    blood sugar diagnostic Strp To check BG 4 times daily, to use with insurance preferred meter 7/25/23  Yes Corey Hernandez MD   calcipotriene (DOVONOX) 0.005 % cream  Apply topically 2 (two) times daily. To psoriasis 11/1/23 10/31/24 Yes Ruth Moody MD   clobetasoL (TEMOVATE) 0.05 % cream Apply topically 2 (two) times daily. Use two weeks then take a one week break. Repeat in 2 week intervals. 9/5/23  Yes Joyce Adamson MD   clobetasoL (TEMOVATE) 0.05 % external solution Apply topically 2 (two) times daily. Use two weeks then take a one week break. Repeat in 2 week intervals. 9/5/23  Yes Joyce Adamson MD   COSENTYX PEN, 2 PENS, 150 mg/mL PnIj Inject 300 mg into the skin every 28 days. 6/10/24  Yes Ruth Moody MD   cyanocobalamin (VITAMIN B-12) 1000 MCG tablet Take 1 tablet (1,000 mcg total) by mouth once daily. 8/29/23  Yes Corey Hernandez MD   fluticasone propionate (FLONASE) 50 mcg/actuation nasal spray 2 sprays (100 mcg total) by Each Nostril route once daily. 8/16/24  Yes Marianna Lira PA-C   gabapentin (NEURONTIN) 300 MG capsule Take 2 capsules (600 mg total) by mouth 3 (three) times daily. 2/9/24 2/8/25 Yes Corey Hernandez MD   gentamicin (GARAMYCIN) 0.1 % ointment Apply topically every other day. 4/17/24  Yes    insulin glargine U-100, Lantus, (LANTUS SOLOSTAR U-100 INSULIN) 100 unit/mL (3 mL) InPn pen Inject 10 Units into the skin every evening. Keep same dose as levemir. 8/13/24 4/5/26 Yes Corey Hernandez MD   ketoconazole (NIZORAL) 2 % shampoo Apply topically once daily. Use in shower on rash, then rinse. 7/11/23  Yes Ruth Moody MD   lancets Misc To check BG 4 times daily, to use with insurance preferred meter 9/25/23  Yes Corey Hernandez MD   levocetirizine (XYZAL) 5 MG tablet Take 1 tablet (5 mg total) by mouth every evening. 8/16/24 8/16/25 Yes Marianna Lira, PA-C   LIDOcaine-prilocaine (EMLA) cream Apply topically as needed. 2/22/24  Yes Yossi Prado DPM   losartan (COZAAR) 25 MG tablet Take 0.5 tablets (12.5 mg total) by mouth once daily. 10/23/23 10/22/24 Yes Corey Hernandez MD   metFORMIN (GLUCOPHAGE-XR) 500 MG ER 24hr tablet TAKE 2 TABLETS BY MOUTH  "TWICE A DAY WITH MEALS 7/17/24  Yes Corey Hernandez MD   pen needle, diabetic 32 gauge x 5/32" Ndle 1 Stick by Misc.(Non-Drug; Combo Route) route once daily. 7/25/23  Yes Corey Hernandez MD   pravastatin (PRAVACHOL) 20 MG tablet TAKE 1 TABLET BY MOUTH EVERY DAY 8/19/24  Yes Corey Hernandez MD   terbinafine HCL (LAMISIL) 250 mg tablet TAKE 1 TABLET BY MOUTH EVERY DAY 11/23/23  Yes Yossi Prado DPM   TRUEPLUS LANCETS 33 gauge Misc Apply topically 4 (four) times daily. 7/25/23  Yes Provider, Historical   amitriptyline (ELAVIL) 10 MG tablet TAKE 1 TABLET BY MOUTH NIGHTLY AS NEEDED FOR INSOMNIA.  Patient not taking: Reported on 8/23/2024 5/20/24   Yossi Prado DPM   amoxicillin-clavulanate 875-125mg (AUGMENTIN) 875-125 mg per tablet Take 1 tablet by mouth every 12 (twelve) hours. for 10 days  Patient not taking: Reported on 8/23/2024 8/16/24 8/26/24  Marianna Lira PA-C   blood-glucose meter kit To check BG 4 times daily, to use with insurance preferred meter 7/25/23 7/24/24  Corey Hernandez MD   collagenase (SANTYL) ointment Apply topically every other day.  Patient not taking: Reported on 8/16/2024 4/17/24      collagenase (SANTYL) ointment Apply topically daily  Patient not taking: Reported on 8/16/2024 5/15/24      COSENTYX PEN, 2 PENS, 150 mg/mL PnIj Inject 300mg into the skin weekly x 5 weeks, then inject 300mg once monthly  Patient not taking: Reported on 8/23/2024 6/10/24   Ruth Moody MD   furosemide (LASIX) 20 MG tablet Take 1 tablet (20 mg total) by mouth once daily. for 20 days 8/2/24 8/22/24  Marianna Lira PA-C   gabapentin (NEURONTIN) 100 MG capsule Take 100 mg by mouth 3 (three) times daily.  Patient not taking: Reported on 8/23/2024 1/27/24   Provider, Historical   gentamicin (GARAMYCIN) 0.1 % ointment Apply topically daily as directed  Patient not taking: Reported on 8/23/2024 5/15/24      ipratropium (ATROVENT) 21 mcg (0.03 %) nasal spray INSTILL 2 SPRAYS INTO EACH NOSTRIL 3 TIMES A " "DAY  Patient not taking: Reported on 8/23/2024 4/4/24   Corey Hernandez MD   ketoconazole (NIZORAL) 2 % cream APPLY TOPICALLY 2 (TWO) TIMES DAILY. TO RASH UNDER BREASTS AND ON BACK  Patient not taking: Reported on 8/16/2024 7/19/24   Ruth Moody MD   mupirocin (BACTROBAN) 2 % ointment apply topically daily as directed  Patient not taking: Reported on 8/16/2024 5/15/24      tiZANidine (ZANAFLEX) 2 MG tablet Take 2 tablets (4 mg total) by mouth every 8 (eight) hours as needed (muscle spasms / muscle tightness). 8/23/24 9/12/24  Corey Hernandez MD        Past medical history, surgical history, and family medical history reviewed and updated as appropriate.    Medications and allergies reviewed.     Objective:          Vitals:    08/23/24 1526   BP: 134/80   BP Location: Right arm   Patient Position: Sitting   Pulse: 96   SpO2: 96%   Weight: 72.8 kg (160 lb 7.9 oz)   Height: 4' 9" (1.448 m)     Body mass index is 34.73 kg/m².  Physical Exam  Constitutional:       Appearance: She is well-developed.   HENT:      Head: Normocephalic and atraumatic.   Eyes:      Extraocular Movements: Extraocular movements intact.   Cardiovascular:      Rate and Rhythm: Normal rate and regular rhythm.      Heart sounds: Normal heart sounds.   Pulmonary:      Effort: Pulmonary effort is normal. No respiratory distress.      Breath sounds: Normal breath sounds. No wheezing.   Abdominal:      General: Bowel sounds are normal. There is no distension.      Palpations: Abdomen is soft.      Tenderness: There is no abdominal tenderness.   Musculoskeletal:         General: Swelling and tenderness present. Normal range of motion.      Cervical back: Normal range of motion.      Right lower leg: No edema.      Left lower leg: No edema.   Skin:     General: Skin is warm and dry.   Neurological:      Mental Status: She is alert and oriented to person, place, and time.      Cranial Nerves: No cranial nerve deficit.      Deep Tendon Reflexes: Reflexes are " normal and symmetric.         Lab Results   Component Value Date    WBC 7.37 10/23/2023    HGB 12.0 10/23/2023    HCT 35.7 (L) 10/23/2023     10/23/2023    CHOL 152 08/22/2024    TRIG 110 08/22/2024    HDL 32 (L) 08/22/2024    ALT 48 (H) 10/23/2023    AST 28 10/23/2023     02/09/2024    K 4.3 02/09/2024     02/09/2024    CREATININE 0.8 02/09/2024    BUN 30 (H) 02/09/2024    CO2 27 02/09/2024    HGBA1C 5.5 02/09/2024       Assessment:       1. Follow-up exam    2. Muscle tightness    3. Mass of left thigh    4. Swelling of joint of pelvic region or thigh, left    5. Bilateral chronic knee pain    6. Chronic neck pain    7. Cervical radiculopathy    8. Breast cancer screening by mammogram    9. Type 2 diabetes mellitus with diabetic polyneuropathy, with long-term current use of insulin    10. Screening for colon cancer          Plan:     Shelley was seen today for follow-up.    Diagnoses and all orders for this visit:    Follow-up exam    Muscle tightness  -     tiZANidine (ZANAFLEX) 2 MG tablet; Take 2 tablets (4 mg total) by mouth every 8 (eight) hours as needed (muscle spasms / muscle tightness).    Mass of left thigh  -     CT Thigh Without Contrast Left; Future    Swelling of joint of pelvic region or thigh, left  -     US Lower Extremity Veins Left; Future    Bilateral chronic knee pain  -     Ambulatory referral/consult to Orthopedics; Future    Chronic neck pain  -     X-Ray Cervical Spine AP And Lateral; Future  -     tiZANidine (ZANAFLEX) 2 MG tablet; Take 2 tablets (4 mg total) by mouth every 8 (eight) hours as needed (muscle spasms / muscle tightness).    Cervical radiculopathy    Breast cancer screening by mammogram  -     Mammo Digital Screening Bilat; Future    Type 2 diabetes mellitus with diabetic polyneuropathy, with long-term current use of insulin  -     HEMOGLOBIN A1C; Future  -     COMPREHENSIVE METABOLIC PANEL; Future    Screening for colon cancer  -     Ambulatory  referral/consult to Endo Procedure ; Future        Health maintenance reviewed with patient.     Follow up in about 6 months (around 2/23/2025).    Corey Hernandez MD  Internal Medicine / Primary Care  [facility]  8/23/2024

## 2024-08-23 NOTE — PATIENT INSTRUCTIONS
X-ray of cervical spine  Schedule ultrasound of left lower extremity  Schedule CT scan of your left thigh    Take tizanidine 2 mg up to three times a day as needed for muscle spasms.

## 2024-09-07 DIAGNOSIS — J32.9 RHINOSINUSITIS: ICD-10-CM

## 2024-09-07 DIAGNOSIS — U07.1 COVID-19: ICD-10-CM

## 2024-09-09 ENCOUNTER — HOSPITAL ENCOUNTER (OUTPATIENT)
Dept: RADIOLOGY | Facility: HOSPITAL | Age: 64
Discharge: HOME | End: 2024-09-09
Attending: INTERNAL MEDICINE
Payer: COMMERCIAL

## 2024-09-09 DIAGNOSIS — M25.452 SWELLING OF JOINT OF PELVIC REGION OR THIGH, LEFT: ICD-10-CM

## 2024-09-09 DIAGNOSIS — R22.42 MASS OF LEFT THIGH: ICD-10-CM

## 2024-09-09 PROCEDURE — 93971 EXTREMITY STUDY: CPT | Mod: TC,LT

## 2024-09-09 PROCEDURE — 93971 EXTREMITY STUDY: CPT | Mod: 26,LT,, | Performed by: RADIOLOGY

## 2024-09-09 PROCEDURE — 73700 CT LOWER EXTREMITY W/O DYE: CPT | Mod: TC,LT

## 2024-09-09 PROCEDURE — 73700 CT LOWER EXTREMITY W/O DYE: CPT | Mod: 26,LT,, | Performed by: RADIOLOGY

## 2024-09-09 RX ORDER — FLUTICASONE PROPIONATE 50 MCG
2 SPRAY, SUSPENSION (ML) NASAL
Qty: 48 ML | Refills: 1 | Status: SHIPPED | OUTPATIENT
Start: 2024-09-09

## 2024-09-09 NOTE — TELEPHONE ENCOUNTER
No care due was identified.  Health [facility] Embedded Care Due Messages. Reference number: [id number].   9/09/2024 7:33:16 AM CDT

## 2024-09-09 NOTE — TELEPHONE ENCOUNTER
Refill Routing Note   Medication(s) are not appropriate for processing by [facility] for the following reason(s):        New or recently adjusted medication    [facility] action(s):  Defer               Appointments  past 12m or future 3m with PCP    Date Provider   Last Visit   8/23/2024 Corey Hernandez MD   Next Visit   2/18/2025 Corey Hernandez MD   ED visits in past 90 days: 0        Note composed:10:41 AM 09/09/2024

## 2024-09-13 DIAGNOSIS — M25.561 PAIN IN BOTH KNEES, UNSPECIFIED CHRONICITY: Primary | ICD-10-CM

## 2024-09-13 DIAGNOSIS — M25.562 PAIN IN BOTH KNEES, UNSPECIFIED CHRONICITY: Primary | ICD-10-CM

## 2024-09-16 ENCOUNTER — OFFICE VISIT (OUTPATIENT)
Dept: ORTHOPEDICS | Facility: CLINIC | Age: 64
End: 2024-09-16
Payer: COMMERCIAL

## 2024-09-16 ENCOUNTER — HOSPITAL ENCOUNTER (OUTPATIENT)
Dept: RADIOLOGY | Facility: HOSPITAL | Age: 64
Discharge: HOME | End: 2024-09-16
Attending: PHYSICIAN ASSISTANT
Payer: COMMERCIAL

## 2024-09-16 VITALS — BODY MASS INDEX: 34.63 KG/M2 | WEIGHT: 160.5 LBS | HEIGHT: 57 IN

## 2024-09-16 DIAGNOSIS — R26.81 UNSTABLE GAIT: ICD-10-CM

## 2024-09-16 DIAGNOSIS — M17.12 PRIMARY OSTEOARTHRITIS OF LEFT KNEE: ICD-10-CM

## 2024-09-16 DIAGNOSIS — M25.561 BILATERAL CHRONIC KNEE PAIN: ICD-10-CM

## 2024-09-16 DIAGNOSIS — M25.562 BILATERAL CHRONIC KNEE PAIN: ICD-10-CM

## 2024-09-16 DIAGNOSIS — M17.11 PRIMARY OSTEOARTHRITIS OF RIGHT KNEE: Primary | ICD-10-CM

## 2024-09-16 DIAGNOSIS — M25.561 PAIN IN BOTH KNEES, UNSPECIFIED CHRONICITY: ICD-10-CM

## 2024-09-16 DIAGNOSIS — G89.29 BILATERAL CHRONIC KNEE PAIN: ICD-10-CM

## 2024-09-16 DIAGNOSIS — M25.562 PAIN IN BOTH KNEES, UNSPECIFIED CHRONICITY: ICD-10-CM

## 2024-09-16 PROCEDURE — 3008F BODY MASS INDEX DOCD: CPT | Mod: CPTII,S$GLB,, | Performed by: PHYSICIAN ASSISTANT

## 2024-09-16 PROCEDURE — 73564 X-RAY EXAM KNEE 4 OR MORE: CPT | Mod: TC,50,FY

## 2024-09-16 PROCEDURE — 20610 DRAIN/INJ JOINT/BURSA W/O US: CPT | Mod: 50,S$GLB,, | Performed by: PHYSICIAN ASSISTANT

## 2024-09-16 PROCEDURE — 3061F NEG MICROALBUMINURIA REV: CPT | Mod: CPTII,S$GLB,, | Performed by: PHYSICIAN ASSISTANT

## 2024-09-16 PROCEDURE — 3044F HG A1C LEVEL LT 7.0%: CPT | Mod: CPTII,S$GLB,, | Performed by: PHYSICIAN ASSISTANT

## 2024-09-16 PROCEDURE — 1159F MED LIST DOCD IN RCRD: CPT | Mod: CPTII,S$GLB,, | Performed by: PHYSICIAN ASSISTANT

## 2024-09-16 PROCEDURE — 1160F RVW MEDS BY RX/DR IN RCRD: CPT | Mod: CPTII,S$GLB,, | Performed by: PHYSICIAN ASSISTANT

## 2024-09-16 PROCEDURE — 99203 OFFICE O/P NEW LOW 30 MIN: CPT | Mod: 25,S$GLB,, | Performed by: PHYSICIAN ASSISTANT

## 2024-09-16 PROCEDURE — 99999 PR PBB SHADOW E&M-EST. PATIENT-LVL V: CPT | Mod: PBBFAC,,, | Performed by: PHYSICIAN ASSISTANT

## 2024-09-16 PROCEDURE — 73564 X-RAY EXAM KNEE 4 OR MORE: CPT | Mod: 26,,, | Performed by: RADIOLOGY

## 2024-09-16 PROCEDURE — 4010F ACE/ARB THERAPY RXD/TAKEN: CPT | Mod: CPTII,S$GLB,, | Performed by: PHYSICIAN ASSISTANT

## 2024-09-16 PROCEDURE — 3066F NEPHROPATHY DOC TX: CPT | Mod: CPTII,S$GLB,, | Performed by: PHYSICIAN ASSISTANT

## 2024-09-16 RX ORDER — TRIAMCINOLONE ACETONIDE 40 MG/ML
40 INJECTION, SUSPENSION INTRA-ARTICULAR; INTRAMUSCULAR
Status: DISCONTINUED | OUTPATIENT
Start: 2024-09-16 | End: 2024-09-16 | Stop reason: HOSPADM

## 2024-09-16 RX ADMIN — TRIAMCINOLONE ACETONIDE 40 MG: 40 INJECTION, SUSPENSION INTRA-ARTICULAR; INTRAMUSCULAR at 03:09

## 2024-09-16 NOTE — PROCEDURES
Large Joint Aspiration/Injection: bilateral knee    Date/Time: 9/16/2024 3:00 PM    Performed by: Ginger Sherman PA-C  Authorized by: Ginger Sherman PA-C    Consent Done?:  Yes (Verbal)  Indications:  Pain  Site marked: the procedure site was marked    Timeout: prior to procedure the correct patient, procedure, and site was verified    Prep: patient was prepped and draped in usual sterile fashion    Local anesthesia used?: No    Local anesthetic:  Topical anesthetic and lidocaine 1% without epinephrine    Details:  Needle Size:  22 G  Ultrasonic Guidance for needle placement?: No    Approach:  Anterolateral  Location:  Knee  Laterality:  Bilateral  Site:  Bilateral knee  Medications (Right):  40 mg triamcinolone acetonide 40 mg/mL  Medications (Left):  40 mg triamcinolone acetonide 40 mg/mL  Patient tolerance:  Patient tolerated the procedure well with no immediate complications

## 2024-09-16 NOTE — PROGRESS NOTES
Subjective:      Chief Complaint: Pain of the Left Knee and Pain of the Right Knee    Patient ID: Shelley Levy is a 63 y.o. female.  64yo F phmx DM2 presents with six-month history of bilateral knee pain.  Worsening after a slip and fall out of a car about a month or 2 ago.  Her pain is anterior lateral.  Worse with sitting to standing.  She notes instability in her knee.  She feels like she needs an assistive device to help with ambulation.  She has tried knee braces which do not help.  She is taking ibuprofen and gabapentin for her neuropathy.        Past Medical History:   Diagnosis Date    Type 2 diabetes mellitus with neurologic complication 9/5/2023        Past Surgical History:   Procedure Laterality Date    BREAST BIOPSY      BREAST LUMPECTOMY          Current Outpatient Medications   Medication Instructions    acetaminophen-codeine 300-30mg (TYLENOL #3) 300-30 mg Tab take 1 tablet by mouth every 4 hours    amitriptyline (ELAVIL) 10 mg, Oral, Nightly    blood sugar diagnostic Strp To check BG 4 times daily, to use with insurance preferred meter    blood-glucose meter kit To check BG 4 times daily, to use with insurance preferred meter    calcipotriene (DOVONOX) 0.005 % cream Topical (Top), 2 times daily, To psoriasis    clobetasoL (TEMOVATE) 0.05 % cream Topical (Top), 2 times daily, Use two weeks then take a one week break. Repeat in 2 week intervals.    clobetasoL (TEMOVATE) 0.05 % external solution Topical (Top), 2 times daily, Use two weeks then take a one week break. Repeat in 2 week intervals.    collagenase (SANTYL) ointment Topical (Top), Every other day    collagenase (SANTYL) ointment Apply topically daily    COSENTYX PEN (2 PENS) 300 mg, Subcutaneous, Every 28 days    COSENTYX PEN, 2 PENS, 150 mg/mL PnIj Inject 300mg into the skin weekly x 5 weeks, then inject 300mg once monthly    cyanocobalamin (VITAMIN B-12) 1,000 mcg, Oral, Daily    fluticasone propionate (FLONASE) 100 mcg, Each Nostril  "   furosemide (LASIX) 20 mg, Oral, Daily    gabapentin (NEURONTIN) 600 mg, Oral, 3 times daily    gabapentin (NEURONTIN) 100 mg, Oral, 3 times daily    gentamicin (GARAMYCIN) 0.1 % ointment Topical (Top), Every other day    gentamicin (GARAMYCIN) 0.1 % ointment Apply topically daily as directed    ipratropium (ATROVENT) 21 mcg (0.03 %) nasal spray INSTILL 2 SPRAYS INTO EACH NOSTRIL 3 TIMES A DAY    ketoconazole (NIZORAL) 2 % cream Topical (Top), 2 times daily, To rash under breasts and on back    ketoconazole (NIZORAL) 2 % shampoo Topical (Top), Daily, Use in shower on rash, then rinse.    lancets Misc To check BG 4 times daily, to use with insurance preferred meter    LANTUS SOLOSTAR U-100 INSULIN 10 Units, Subcutaneous, Nightly, Keep same dose as levemir.    levocetirizine (XYZAL) 5 mg, Oral, Nightly    LIDOcaine-prilocaine (EMLA) cream Topical (Top), As needed (PRN)    losartan (COZAAR) 12.5 mg, Oral, Daily    metFORMIN (GLUCOPHAGE-XR) 1,000 mg, Oral, 2 times daily with meals    mupirocin (BACTROBAN) 2 % ointment apply topically daily as directed    pen needle, diabetic 32 gauge x 5/32" Ndle 1 Stick, Misc.(Non-Drug; Combo Route), Daily    pravastatin (PRAVACHOL) 20 mg, Oral    terbinafine HCL (LAMISIL) 250 mg, Oral    TRUEPLUS LANCETS 33 gauge Misc Topical (Top), 4 times daily        Review of patient's allergies indicates:  No Known Allergies    Review of Systems   Constitutional: Negative for fever and malaise/fatigue.   Eyes:  Negative for blurred vision.   Cardiovascular:  Negative for chest pain.   Respiratory:  Negative for shortness of breath.    Skin:  Negative for poor wound healing.   Musculoskeletal:  Positive for arthritis, joint pain and myalgias.   Genitourinary:  Negative for bladder incontinence.   Neurological:  Negative for dizziness, numbness and paresthesias.   Psychiatric/Behavioral:  Negative for altered mental status.        The patient's relevant past medical, surgical, and social history " "was reviewed in Epic.       Objective:      VITAL SIGNS: Ht 4' 9" (1.448 m)   Wt 72.8 kg (160 lb 7.9 oz)   BMI 34.73 kg/m²     General    Nursing note and vitals reviewed.  Constitutional: She is oriented to person, place, and time. She appears well-developed and well-nourished.   Neurological: She is alert and oriented to person, place, and time.     General Musculoskeletal Exam   Gait: antalgic       Right Knee Exam     Inspection   Deformity: present    Tenderness   The patient is tender to palpation of the medial joint line.    Range of Motion   Extension:  5   Flexion:  100     Tests   Ligament Examination   Lachman: normal (-1 to 2mm)   MCL - Valgus: normal (0 to 2mm)  LCL - Varus: normal    Other   Sensation: decreased    Left Knee Exam     Inspection   Deformity: present    Tenderness   The patient tender to palpation of the medial joint line.    Range of Motion   Extension:  5   Flexion:  100     Tests   Stability   Lachman: normal (-1 to 2mm)   MCL - Valgus: normal (0 to 2mm)  LCL - Varus: normal (0 to 2mm)    Other   Sensation: decreased    Muscle Strength   Right Lower Extremity   Quadriceps:  4/5   Hamstrin/5   Left Lower Extremity   Quadriceps:  4/5   Hamstrin/5        Imaging  X-ray Knee Ortho Bilateral with Flexion  Narrative: EXAMINATION:  XR KNEE ORTHO BILAT WITH FLEXION    CLINICAL HISTORY:  Pain in right knee    TECHNIQUE:  AP standing of both knees, PA flexion standing views of both knees, and Merchant views of both knees were performed.  Lateral views of both knees were also performed.    COMPARISON:  2024 bilateral knee    FINDINGS:  Right knee: Severe medial knee compartment joint space narrowing.  Moderate-sized osteophyte at the medial and lateral knee margin.  No acute fracture seen, no osseous lesion seen.  Moderate femoral patellar joint space narrowing and osteophyte formation.  Moderate-sized joint effusion.  No acute fracture seen, no osseous lesion seen.  The soft " tissues appear normal.    Left knee: Severe medial knee compartment joint space narrowing.  Small osteophyte at the medial and lateral knee margin.  No acute fracture, no osseous lesions.  Mild femoral patellar joint space narrowing, moderate-sized osteophyte at the femoral patellar joint.  Moderate joint effusion.  Mild soft tissue edema prepatellar soft tissues.  Impression: Advanced degenerative changes of the bilateral medial knee joints.    Bilateral joint effusion.    Electronically signed by: Queenie Springer MD  Date:    09/16/2024  Time:    16:04    I have reviewed the above radiograph and agree with the findings stated by the radiologist.           Assessment:       Shelley Levy is a 63 y.o. female seen in the office today for   1. Primary osteoarthritis of right knee    2. Bilateral chronic knee pain    3. Primary osteoarthritis of left knee    4. Unstable gait     Bilateral knee bone-on-bone osteoarthritis. We discussed the natural history of arthritis and different treatment options including conservative vs surgical management. These include anti-inflammatories, acetaminophen, bracing, formal physical therapy, injections, Iovera cryotherapy procedure  and finally surgical intervention.    Given her insurance, have minimal options left.  Will try bilateral knee corticosteroid injections today.  I will also give her an prescription for her to get a walker.  Discuss knee replacement in the future also.  We will see how the injections go.  Follow-up will be as needed.      Plan:       Shelley was seen today for pain and pain.    Diagnoses and all orders for this visit:    Primary osteoarthritis of right knee  -     Large Joint Aspiration/Injection: bilateral knee    Bilateral chronic knee pain  -     Ambulatory referral/consult to Orthopedics    Primary osteoarthritis of left knee  -     Large Joint Aspiration/Injection: bilateral knee    Unstable gait  -     WALKER FOR HOME USE      Bilateral  Kenalog injections given today  Prescription for a cecilia walker also given.  Follow-up will be as needed.    Diagnoses and plan discussed with the patient, as well as the expected course and duration of his symptoms.  All questions and concerns were addressed prior to the end of the visit.   Instructed patient to call office if they have any future questions/concerns or to schedule apt. Patient will return to see me if symptoms worsen or fail to improve    Note dictated with voice recognition software, please excuse any grammatical errors.        Ginger Sherman PA-C      Department of Orthopedic Surgery  [facility]  Office: [phone]  09/16/2024

## 2024-09-18 ENCOUNTER — PROCEDURE VISIT (OUTPATIENT)
Dept: NEUROLOGY | Facility: CLINIC | Age: 64
End: 2024-09-18
Payer: COMMERCIAL

## 2024-09-18 DIAGNOSIS — G62.9 NEUROPATHY: ICD-10-CM

## 2024-09-18 PROCEDURE — 95886 MUSC TEST DONE W/N TEST COMP: CPT | Mod: S$GLB,,, | Performed by: PSYCHIATRY & NEUROLOGY

## 2024-09-18 PROCEDURE — 95910 NRV CNDJ TEST 7-8 STUDIES: CPT | Mod: S$GLB,,, | Performed by: PSYCHIATRY & NEUROLOGY

## 2024-09-18 PROCEDURE — 99213 OFFICE O/P EST LOW 20 MIN: CPT | Mod: S$GLB,,, | Performed by: PSYCHIATRY & NEUROLOGY

## 2024-09-18 NOTE — PROCEDURES
EMG W/ ULTRASOUND AND NERVE CONDUCTION TEST 2 Extremities    Date/Time: 9/18/2024 2:00 PM    Performed by: Hipolito Brown MD  Authorized by: Adan Richardson MD                                                                 [facility] [address]     Subjective:       Patient ID: Shelley Levy is a 63 y.o. female here for a EMG focused evaluation for leg pain. Previous visits and diagnostic evaluation has been reviewed.  Patient with a history of diabetes who complains of progressive severe bilateral feet pain and numbness.  She also describes back pain which radiates down bilateral legs.  Symptoms have been progressively worsening and severe at times.   HPI  Review of patient's allergies indicates:  No Known Allergies   There were no vitals filed for this visit.   Chief Complaint: No chief complaint on file.    Past Medical History:   Diagnosis Date    Type 2 diabetes mellitus with neurologic complication 9/5/2023      Social History     Socioeconomic History    Marital status:    Tobacco Use    Smoking status: Never    Smokeless tobacco: Never   Substance and Sexual Activity    Alcohol use: No    Drug use: Never    Sexual activity: Not Currently     Partners: Female     Social Determinants of Health     Financial Resource Strain: Low Risk  (3/7/2024)    Overall Financial Resource Strain (CARDIA)     Difficulty of Paying Living Expenses: Not hard at all   Food Insecurity: No Food Insecurity (3/7/2024)    Hunger Vital Sign     Worried About Running Out of Food in the Last Year: Never true     Ran Out of Food in the Last Year: Never true   Transportation Needs: No Transportation Needs (3/7/2024)    PRAPARE - Transportation     Lack of Transportation (Medical): No     Lack of Transportation (Non-Medical): No   Physical Activity: Unknown (3/7/2024)    Exercise Vital Sign     Days of Exercise per Week: 4 days   Stress: No Stress Concern Present (3/7/2024)    [employer]  - Occupational Stress Questionnaire     Feeling of Stress : Only a little   Housing Stability: Unknown (3/7/2024)    Housing Stability Vital Sign     Unable to Pay for Housing in the Last Year: No     Unstable Housing in the Last Year: No            Objective:      Physical Exam    Constitutional: Patient appears well-nourished.   Head: Normocephalic and atraumatic.   Mouth/Throat: Oropharynx is clear and moist.   Pulmonary/Chest: Effort normal.   Abdominal: Soft.   Skin: Skin is warm and dry.      General:  Patient is alert and cooperative.  Affect:  Patient is appropriate to surroundings and environment.  Language:  Speech is fluent.  HEENT:  There are no outward signs of trauma to head or face.  Cranial Nerves:  Pupils are equal round and reactive to light. Extra-ocular movements are intact. Face, tongue, and palate are  symmetrical.  Motor:  Patient exhibits normal strength testing in bilateral proximal and distal lower extremities.  Reflexes:  Absent in bilateral lower extremities.  Gait:  Ambulation is independent without use of cane or walker without signs of ataxia or circumduction.  Cerebellar:  Normal finger to nose testing without dysmetria.  Sensory:    Decreased distally bilaterally  Musculoskeletal:  There is no severe tenderness to palpation and manipulation of lumbar spine region.   Assessment:       We reviewed and discussed at length results of EMG of bilateral lower extremities performed today revealing evidence of an advanced axonal sensorimotor polyneuropathy most likely secondary to the patient's history of diabetes without significant evidence of lumbar radiculopathy. These findings are available via media section of chart review.   1. Neuropathy              Plan:       We discussed treatment options at length. Recommend patient keep appointment with referring provider.       Discussion at length with patient that increased glucose can be neurotoxic and that glucose control is required to  prevent further damage. Discussed at length the need for close follow up with primary care to make sure there is appropriate management of the patient's medications at this time.     I spent a total of 35 minutes on the day of the visit. This includes face to face time and non-face to face time preparing to see the patient (eg, review of tests), obtaining and/or reviewing separately obtained history, documenting clinical information in the electronic or other health record, independently interpreting results and communicating results to the patient/family/caregiver, or care coordinator.    Hipolito Brown MD, FAAN   09/18/2024   3:10 PM       A dictation device was used to produce this document. Use of such devices sometimes results in grammatical errors or replacement of words that sound similarly.

## 2024-09-26 NOTE — TELEPHONE ENCOUNTER
VASCULAR SURGERY   PROGRESS NOTE      Assessment/Plan   Shirin Slater is 57 y.o. female with history of severe PAD s/p R hip disarticulation s/p L CFA and EIA embolectomy 12/23 s/p multiple L toe amputations, T2DM complicated by diabetic neuropathy, HTN, HLD, renal and splenic thromboses on aspirin/plavix/coumadin, and current tobacco use who presents as a transfer from [facility] for management of L 2nd toe gangrene with concern of osteomyelitis.  Now s/p Left femoral endarterectomy, profundaplasty, common [id number] tibial bypass using 6 mm ringed PTFE, left lower extremity angiogram. Patient planned for TMA with podiatry 9/27.    9/25 : Pre-op exam: Left foot monophasic DP and PT  Post-op exam: Left foot palpable AT, multiphasic DP, monophasic PT    Plan:  Pain control: Oxy 5/10 PRN and scheduled acetaminophen  Maintain -160 mmHg   Sliding scale insulin  Omeprazole PO daily    ASA, increased to high intensity heparin gtt, holding plavix  Continue Q2 neurovascular checks  Home immodium PRN  Regular diet now, NPO @ MN 0001 9/27  Continue unasyn  OR 9/27 with podiatry for TMA  Communicated and verified with podiatry team ( Albert Taylor DPM)   NO TOURNIQUET to be used during this case  Heparin to be held when on call for pre-op  Resume heparin 2 hours post-op  Maintain left internal jugular CVC  Maintain vasques    Dispo: continue care in LT 7 SDU    D/w attending Dr. Dunphy.    Lexus Bryant MD  PGY1 Vascular Surgery  [id number]  Available via secure chat    -----------------------------------------------------------------------------------------  Subjective   No acute events overnight.  Still on heparin gtt.  Recovered well on RNF overnight.  Palpable AT on left foot since OR, mutli DP, mono PT  No chest pain or SOB. No other complaints      Objective   Vitals:  Heart Rate:  []   Temp:  [36 °C (96.8 °F)-36.8 °C (98.2 °F)]   Resp:  [10-25]   BP: ()/(50-76)   SpO2:  [92 %-96 %]  Please try again.     Exam:  Constitutional: No acute distress  Neuro: AOx3, grossly motor intact. Limited sensation of distal L foot.  ENMT: moist mucous membranes  Head/neck: atraumatic  CV: no tachycardia  Pulm: non-labored on room air  GI: soft, non-tender, non-distended  : vasques with clear yellow urine  Skin: warm and dry. Blanching erythema of L forefoot with tenderness to palpation. wet gangrene of left 2nd digit, extreme pain in LLE and tenderness to light touch. Dressing with wet to dry with betatine guaze over wound  Musculoskeletal: moving all extremities. S/p R hip disarticulation  Vascular: L foot - Palpable AT on left foot since OR, mutli DP, mono PT    Labs:  Results from last 7 days   Lab Units 09/26/24 0344 09/25/24 2211 09/24/24  0939   WBC AUTO x10*3/uL 15.7* 16.3* 8.2   HEMOGLOBIN g/dL 10.1* 9.7* 12.2   PLATELETS AUTO x10*3/uL 280 331 333      Results from last 7 days   Lab Units 09/26/24  0344 09/25/24 2211 09/24/24  0939   SODIUM mmol/L 141 142 141   POTASSIUM mmol/L 3.9 4.2 3.8   CHLORIDE mmol/L 110* 107 104   CO2 mmol/L 27 27 29   BUN mg/dL 8 6 5*   CREATININE mg/dL 0.55 0.51 0.57   GLUCOSE mg/dL 166* 159* 141*   MAGNESIUM mg/dL 1.40*  --  2.18   PHOSPHORUS mg/dL 4.4 4.9 2.2*      Results from last 7 days   Lab Units 09/25/24 2211 09/20/24  1551 09/20/24  0235   INR  1.2* 1.2* 2.3*   PROTIME seconds 13.2* 13.5* 26.3*   APTT seconds 37 33 60*      Results from last 7 days   Lab Units 09/26/24  0822 09/26/24 0347 09/24/24  0939   ANTI XA UNFRACTIONATED IU/mL 0.7 <0.1 0.3

## 2024-09-30 ENCOUNTER — OFFICE VISIT (OUTPATIENT)
Dept: DERMATOLOGY | Facility: CLINIC | Age: 64
End: 2024-09-30
Payer: COMMERCIAL

## 2024-09-30 DIAGNOSIS — Z79.899 ENCOUNTER FOR LONG-TERM CURRENT USE OF HIGH RISK MEDICATION: ICD-10-CM

## 2024-09-30 DIAGNOSIS — L29.9 ITCHING: ICD-10-CM

## 2024-09-30 DIAGNOSIS — L28.0 LICHEN SIMPLEX CHRONICUS: ICD-10-CM

## 2024-09-30 DIAGNOSIS — L40.9 PSORIASIS: Primary | ICD-10-CM

## 2024-09-30 DIAGNOSIS — L82.1 SEBORRHEIC KERATOSES: ICD-10-CM

## 2024-09-30 PROCEDURE — 3066F NEPHROPATHY DOC TX: CPT | Mod: CPTII,S$GLB,, | Performed by: DERMATOLOGY

## 2024-09-30 PROCEDURE — 99999 PR PBB SHADOW E&M-EST. PATIENT-LVL I: CPT | Mod: PBBFAC,,, | Performed by: DERMATOLOGY

## 2024-09-30 PROCEDURE — 3044F HG A1C LEVEL LT 7.0%: CPT | Mod: CPTII,S$GLB,, | Performed by: DERMATOLOGY

## 2024-09-30 PROCEDURE — 99214 OFFICE O/P EST MOD 30 MIN: CPT | Mod: S$GLB,,, | Performed by: DERMATOLOGY

## 2024-09-30 PROCEDURE — 3061F NEG MICROALBUMINURIA REV: CPT | Mod: CPTII,S$GLB,, | Performed by: DERMATOLOGY

## 2024-09-30 PROCEDURE — 4010F ACE/ARB THERAPY RXD/TAKEN: CPT | Mod: CPTII,S$GLB,, | Performed by: DERMATOLOGY

## 2024-09-30 PROCEDURE — G2211 COMPLEX E/M VISIT ADD ON: HCPCS | Mod: S$GLB,,, | Performed by: DERMATOLOGY

## 2024-09-30 RX ORDER — BETAMETHASONE DIPROPIONATE 0.5 MG/G
OINTMENT TOPICAL 2 TIMES DAILY
Qty: 45 G | Refills: 3 | Status: SHIPPED | OUTPATIENT
Start: 2024-09-30

## 2024-09-30 NOTE — PROGRESS NOTES
Subjective:      Patient ID:  Shelley Levy is a 63 y.o. female who presents for   Chief Complaint   Patient presents with    Follow-up   Patient with new area of concern:   Location: underneath breast  Duration: comes and goes  S/s; pt feels like sits fungal  Previous treatments: Ketoconazole cream    History of Present Illness: The patient presents for follow up of psoriasis     The patient was last seen on: 6-10-24 for rash which is unchanged pt is currently on cosentyx, dovenox and clobetasol. Pt feels like the cream is not helping. States the cream is more of a gel, that sticks to her clothes.    Other skin complaints: none      Here for first f/u visit since starting Cosentyx.  Now on q4 wks injections. Helping clear up legs, groin creases.   Still with persistent flaky area on left thigh.  C/oitching under breasts and on buttocks - no rash   Using baby powder and Gain detergent.    Review of Systems    Objective:   Physical Exam   Constitutional: She appears well-developed and well-nourished. No distress.   Neurological: She is alert and oriented to person, place, and time. She is not disoriented.   Psychiatric: She has a normal mood and affect.   Skin:   Areas Examined (abnormalities noted in diagram):   Head / Face Inspection Performed  Neck Inspection Performed  Chest / Axilla Inspection Performed  Abdomen Inspection Performed  Back Inspection Performed  RUE Inspected  LUE Inspection Performed  RLE Inspected  LLE Inspection Performed  Nails and Digits Inspection Performed            Diagram Legend     Erythematous scaling macule/papule c/w actinic keratosis       Vascular papule c/w angioma      Pigmented verrucoid papule/plaque c/w seborrheic keratosis      Yellow umbilicated papule c/w sebaceous hyperplasia      Irregularly shaped tan macule c/w lentigo     1-2 mm smooth white papules consistent with Milia      Movable subcutaneous cyst with punctum c/w epidermal inclusion cyst      Subcutaneous  movable cyst c/w pilar cyst      Firm pink to brown papule c/w dermatofibroma      Pedunculated fleshy papule(s) c/w skin tag(s)      Evenly pigmented macule c/w junctional nevus     Mildly variegated pigmented, slightly irregular-bordered macule c/w mildly atypical nevus      Flesh colored to evenly pigmented papule c/w intradermal nevus       Pink pearly papule/plaque c/w basal cell carcinoma      Erythematous hyperkeratotic cursted plaque c/w SCC      Surgical scar with no sign of skin cancer recurrence      Open and closed comedones      Inflammatory papules and pustules      Verrucoid papule consistent consistent with wart     Erythematous eczematous patches and plaques     Dystrophic onycholytic nail with subungual debris c/w onychomycosis     Umbilicated papule    Erythematous-base heme-crusted tan verrucoid plaque consistent with inflamed seborrheic keratosis     Erythematous Silvery Scaling Plaque c/w Psoriasis     See annotation      Assessment / Plan:        Psoriasis  -     betamethasone dipropionate (DIPROLENE) 0.05 % ointment; Apply topically 2 (two) times daily. To rash on left leg  Dispense: 45 g; Refill: 3    Lichen simplex chronicus    Itching    Seborrheic keratoses    Encounter for long-term current use of high risk medication         Continue Cosentyx  For itching:  I advised changing to a fragrance free bar soap or body wash such as Dove, and fragrance free detergent such as Tide Free & Clear, for sensitive skin.      For LSC plaque left thigh -   Rx for betamethasone ointment sent        No follow-ups on file.f/u 3 mo

## 2024-10-09 ENCOUNTER — OFFICE VISIT (OUTPATIENT)
Dept: INTERNAL MEDICINE | Facility: CLINIC | Age: 64
End: 2024-10-09
Payer: COMMERCIAL

## 2024-10-09 VITALS
HEIGHT: 57 IN | SYSTOLIC BLOOD PRESSURE: 138 MMHG | WEIGHT: 167.13 LBS | HEART RATE: 73 BPM | DIASTOLIC BLOOD PRESSURE: 70 MMHG | BODY MASS INDEX: 36.06 KG/M2 | OXYGEN SATURATION: 99 %

## 2024-10-09 DIAGNOSIS — L97.312 ULCER OF RIGHT ANKLE, WITH FAT LAYER EXPOSED: ICD-10-CM

## 2024-10-09 DIAGNOSIS — E11.3313 MODERATE NONPROLIFERATIVE DIABETIC RETINOPATHY OF BOTH EYES WITH MACULAR EDEMA ASSOCIATED WITH TYPE 2 DIABETES MELLITUS: ICD-10-CM

## 2024-10-09 DIAGNOSIS — L40.9 PSORIASIS: ICD-10-CM

## 2024-10-09 DIAGNOSIS — E78.5 DYSLIPIDEMIA: ICD-10-CM

## 2024-10-09 DIAGNOSIS — Z79.4 TYPE 2 DIABETES MELLITUS WITH DIABETIC POLYNEUROPATHY, WITH LONG-TERM CURRENT USE OF INSULIN: ICD-10-CM

## 2024-10-09 DIAGNOSIS — E11.42 TYPE 2 DIABETES MELLITUS WITH DIABETIC POLYNEUROPATHY, WITH LONG-TERM CURRENT USE OF INSULIN: ICD-10-CM

## 2024-10-09 DIAGNOSIS — E11.42 DIABETIC POLYNEUROPATHY ASSOCIATED WITH TYPE 2 DIABETES MELLITUS: ICD-10-CM

## 2024-10-09 DIAGNOSIS — E11.311 DIABETIC MACULAR EDEMA OF BOTH EYES: ICD-10-CM

## 2024-10-09 DIAGNOSIS — H25.13 AGE-RELATED NUCLEAR CATARACT OF BOTH EYES: ICD-10-CM

## 2024-10-09 DIAGNOSIS — Z09 FOLLOW-UP EXAM: Primary | ICD-10-CM

## 2024-10-09 PROCEDURE — 3078F DIAST BP <80 MM HG: CPT | Mod: CPTII,S$GLB,, | Performed by: INTERNAL MEDICINE

## 2024-10-09 PROCEDURE — 1160F RVW MEDS BY RX/DR IN RCRD: CPT | Mod: CPTII,S$GLB,, | Performed by: INTERNAL MEDICINE

## 2024-10-09 PROCEDURE — 3008F BODY MASS INDEX DOCD: CPT | Mod: CPTII,S$GLB,, | Performed by: INTERNAL MEDICINE

## 2024-10-09 PROCEDURE — 99214 OFFICE O/P EST MOD 30 MIN: CPT | Mod: S$GLB,,, | Performed by: INTERNAL MEDICINE

## 2024-10-09 PROCEDURE — 3066F NEPHROPATHY DOC TX: CPT | Mod: CPTII,S$GLB,, | Performed by: INTERNAL MEDICINE

## 2024-10-09 PROCEDURE — 1159F MED LIST DOCD IN RCRD: CPT | Mod: CPTII,S$GLB,, | Performed by: INTERNAL MEDICINE

## 2024-10-09 PROCEDURE — 99999 PR PBB SHADOW E&M-EST. PATIENT-LVL V: CPT | Mod: PBBFAC,,, | Performed by: INTERNAL MEDICINE

## 2024-10-09 PROCEDURE — 3061F NEG MICROALBUMINURIA REV: CPT | Mod: CPTII,S$GLB,, | Performed by: INTERNAL MEDICINE

## 2024-10-09 PROCEDURE — 3075F SYST BP GE 130 - 139MM HG: CPT | Mod: CPTII,S$GLB,, | Performed by: INTERNAL MEDICINE

## 2024-10-09 PROCEDURE — 4010F ACE/ARB THERAPY RXD/TAKEN: CPT | Mod: CPTII,S$GLB,, | Performed by: INTERNAL MEDICINE

## 2024-10-09 PROCEDURE — 3044F HG A1C LEVEL LT 7.0%: CPT | Mod: CPTII,S$GLB,, | Performed by: INTERNAL MEDICINE

## 2024-10-09 RX ORDER — IBUPROFEN 800 MG/1
800 TABLET ORAL EVERY 8 HOURS PRN
COMMUNITY
Start: 2024-10-03

## 2024-10-09 RX ORDER — CHLORHEXIDINE GLUCONATE ORAL RINSE 1.2 MG/ML
SOLUTION DENTAL
COMMUNITY
Start: 2024-10-03

## 2024-10-09 NOTE — PROGRESS NOTES
Subjective:       Patient ID: Shelley Levy is a 63 y.o. female.    Chief Complaint: Follow-up      HPI  Shelley Levy is a 63 y.o. year old female established patient presents for follow up. Since last visit, had cortisone injections to bilateral knees. Some days better, some days the same. Has been having swelling of b/l lower extremities. Not much improvement with lasix. Patient v/u to watch sodium intake.     Review of Systems   Constitutional:  Negative for activity change and unexpected weight change.   HENT:  Negative for hearing loss, rhinorrhea and trouble swallowing.    Eyes:  Positive for visual disturbance. Negative for discharge.   Respiratory:  Negative for chest tightness and wheezing.    Cardiovascular:  Positive for leg swelling. Negative for chest pain and palpitations.   Gastrointestinal:  Negative for blood in stool, constipation, diarrhea and vomiting.   Endocrine: Negative for polydipsia and polyuria.   Genitourinary:  Negative for difficulty urinating, dysuria, hematuria and menstrual problem.   Musculoskeletal:  Negative for arthralgias, joint swelling and neck pain.   Neurological:  Positive for numbness (hands and feet). Negative for weakness and headaches.   Psychiatric/Behavioral:  Negative for confusion and dysphoric mood.          Past Medical History:   Diagnosis Date    Type 2 diabetes mellitus with neurologic complication 9/5/2023        Prior to Admission medications    Medication Sig Start Date End Date Taking? Authorizing Provider   amitriptyline (ELAVIL) 10 MG tablet TAKE 1 TABLET BY MOUTH NIGHTLY AS NEEDED FOR INSOMNIA. 5/20/24  Yes Yossi Prado DPM   betamethasone dipropionate (DIPROLENE) 0.05 % ointment Apply topically 2 (two) times daily. To rash on left leg 9/30/24  Yes Ruth Moody MD   blood sugar diagnostic Strp To check BG 4 times daily, to use with insurance preferred meter 7/25/23  Yes Corey Hernandez MD   calcipotriene (DOVONOX) 0.005 % cream Apply  topically 2 (two) times daily. To psoriasis 11/1/23 10/31/24 Yes Ruth Moody MD   chlorhexidine (PERIDEX) 0.12 % solution SMARTSI.5 Ounce(s) By Mouth Twice Daily 10/3/24  Yes Provider, Historical   clobetasoL (TEMOVATE) 0.05 % cream Apply topically 2 (two) times daily. Use two weeks then take a one week break. Repeat in 2 week intervals. 23  Yes Joyce Adamson MD   clobetasoL (TEMOVATE) 0.05 % external solution Apply topically 2 (two) times daily. Use two weeks then take a one week break. Repeat in 2 week intervals. 23  Yes Joyce Adamson MD   collagenase (SANTYL) ointment Apply topically every other day. 24  Yes    collagenase (SANTYL) ointment Apply topically daily 5/15/24  Yes    COSENTYX PEN, 2 PENS, 150 mg/mL PnIj Inject 300mg into the skin weekly x 5 weeks, then inject 300mg once monthly 6/10/24  Yes Ruth Moody MD   COSENTYX PEN, 2 PENS, 150 mg/mL PnIj Inject 300 mg into the skin every 28 days. 6/10/24  Yes Ruth Moody MD   cyanocobalamin (VITAMIN B-12) 1000 MCG tablet Take 1 tablet (1,000 mcg total) by mouth once daily. 23  Yes Corey Hernandez MD   fluticasone propionate (FLONASE) 50 mcg/actuation nasal spray SPRAY 2 SPRAYS BY EACH NOSTRIL ROUTE ONCE DAILY. 24  Yes Corey Hernandez MD   gabapentin (NEURONTIN) 100 MG capsule Take 100 mg by mouth 3 (three) times daily. 24  Yes Provider, Historical   gabapentin (NEURONTIN) 300 MG capsule Take 2 capsules (600 mg total) by mouth 3 (three) times daily. 24 Yes Corey Hernandez MD   gentamicin (GARAMYCIN) 0.1 % ointment Apply topically every other day. 24  Yes    gentamicin (GARAMYCIN) 0.1 % ointment Apply topically daily as directed 5/15/24  Yes    ibuprofen (ADVIL,MOTRIN) 800 MG tablet Take 800 mg by mouth every 8 (eight) hours as needed. 10/3/24  Yes Provider, Historical   insulin glargine U-100, Lantus, (LANTUS SOLOSTAR U-100 INSULIN) 100 unit/mL (3 mL) InPn pen Inject 10 Units into the skin every evening. Keep same  "dose as levemir. 8/13/24 4/5/26 Yes Corey Hernandez MD   ipratropium (ATROVENT) 21 mcg (0.03 %) nasal spray INSTILL 2 SPRAYS INTO EACH NOSTRIL 3 TIMES A DAY 4/4/24  Yes Corey Hernandez MD   ketoconazole (NIZORAL) 2 % cream APPLY TOPICALLY 2 (TWO) TIMES DAILY. TO RASH UNDER BREASTS AND ON BACK 7/19/24  Yes Ruth Moody MD   ketoconazole (NIZORAL) 2 % shampoo Apply topically once daily. Use in shower on rash, then rinse. 7/11/23  Yes Ruth Moody MD   lancets Mis To check BG 4 times daily, to use with insurance preferred meter 9/25/23  Yes Corey Hernandez MD   levocetirizine (XYZAL) 5 MG tablet Take 1 tablet (5 mg total) by mouth every evening. 8/16/24 8/16/25 Yes Marianna Lira, SAMIRA   LIDOcaine-prilocaine (EMLA) cream Apply topically as needed. 2/22/24  Yes Yossi Prado DPM   losartan (COZAAR) 25 MG tablet Take 0.5 tablets (12.5 mg total) by mouth once daily. 10/23/23 10/22/24 Yes Corey Hernandez MD   metFORMIN (GLUCOPHAGE-XR) 500 MG ER 24hr tablet TAKE 2 TABLETS BY MOUTH TWICE A DAY WITH MEALS 7/17/24  Yes Corey Hernandez MD   mupirocin (BACTROBAN) 2 % ointment apply topically daily as directed 5/15/24  Yes    pen needle, diabetic 32 gauge x 5/32" Ndle 1 Stick by Misc.(Non-Drug; Combo Route) route once daily. 7/25/23  Yes Corey Hernandez MD   pravastatin (PRAVACHOL) 20 MG tablet TAKE 1 TABLET BY MOUTH EVERY DAY 8/19/24  Yes Corey Hernandez MD   terbinafine HCL (LAMISIL) 250 mg tablet TAKE 1 TABLET BY MOUTH EVERY DAY 11/23/23  Yes Yossi Prado DPM   TRUEPLUS LANCETS 33 gauge Misc Apply topically 4 (four) times daily. 7/25/23  Yes Provider, Historical   acetaminophen-codeine 300-30mg (TYLENOL #3) 300-30 mg Tab take 1 tablet by mouth every 4 hours  Patient not taking: Reported on 10/9/2024 5/15/24      blood-glucose meter kit To check BG 4 times daily, to use with insurance preferred meter 7/25/23 7/24/24  Corey Hernandez MD   furosemide (LASIX) 20 MG tablet Take 1 tablet (20 mg total) by mouth once daily. for 20 days " "8/2/24 8/22/24  Marianna Lira PA-C        Past medical history, surgical history, and family medical history reviewed and updated as appropriate.    Medications and allergies reviewed.     Objective:          Vitals:    10/09/24 1425   BP: 138/70   BP Location: Right arm   Patient Position: Sitting   Pulse: 73   SpO2: 99%   Weight: 75.8 kg (167 lb 1.7 oz)   Height: 4' 9" (1.448 m)     Body mass index is 36.16 kg/m².  Physical Exam  Constitutional:       Appearance: She is well-developed.      Comments: Using walker   HENT:      Head: Normocephalic and atraumatic.   Eyes:      Extraocular Movements: Extraocular movements intact.   Cardiovascular:      Rate and Rhythm: Normal rate and regular rhythm.      Heart sounds: Normal heart sounds.   Pulmonary:      Effort: Pulmonary effort is normal. No respiratory distress.      Breath sounds: Normal breath sounds. No wheezing.   Abdominal:      General: Bowel sounds are normal. There is no distension.      Palpations: Abdomen is soft.      Tenderness: There is no abdominal tenderness.   Musculoskeletal:         General: No tenderness. Normal range of motion.      Cervical back: Normal range of motion.      Right lower leg: Edema present.      Left lower leg: Edema present.   Skin:     General: Skin is warm and dry.   Neurological:      Mental Status: She is alert and oriented to person, place, and time.      Cranial Nerves: No cranial nerve deficit.      Deep Tendon Reflexes: Reflexes are normal and symmetric.         Lab Results   Component Value Date    WBC 7.37 10/23/2023    HGB 12.0 10/23/2023    HCT 35.7 (L) 10/23/2023     10/23/2023    CHOL 152 08/22/2024    TRIG 110 08/22/2024    HDL 32 (L) 08/22/2024    ALT 25 08/23/2024    AST 26 08/23/2024     08/23/2024    K 4.8 08/23/2024     08/23/2024    CREATININE 0.8 08/23/2024    BUN 20 08/23/2024    CO2 25 08/23/2024    HGBA1C 5.5 08/23/2024       Assessment:       1. Follow-up exam    2. Type 2 " diabetes mellitus with diabetic polyneuropathy, with long-term current use of insulin    3. Diabetic polyneuropathy associated with type 2 diabetes mellitus    4. Dyslipidemia    5. Ulcer of right ankle, with fat layer exposed    6. Moderate nonproliferative diabetic retinopathy of both eyes with macular edema associated with type 2 diabetes mellitus    7. Diabetic macular edema of both eyes    8. Age-related nuclear cataract of both eyes    9. Psoriasis          Plan:     Shelley was seen today for follow-up.    Diagnoses and all orders for this visit:    Follow-up exam    Type 2 diabetes mellitus with diabetic polyneuropathy, with long-term current use of insulin  Comments:  last a1c well controlled. on lantus 10, metformin  Orders:  -     CBC Auto Differential; Future  -     Comprehensive Metabolic Panel; Future  -     Hemoglobin A1C; Future    Diabetic polyneuropathy associated with type 2 diabetes mellitus  Comments:  emg/ncs confirmed diabetic neuropathy. prescription for diabetic shoes completed for patient.    Dyslipidemia  Comments:  on pravastatin, recheck lipid panel next visit  Orders:  -     TSH; Future  -     Lipid Panel; Future    Ulcer of right ankle, with fat layer exposed    Moderate nonproliferative diabetic retinopathy of both eyes with macular edema associated with type 2 diabetes mellitus  Comments:  pt scheduled to f/u retina    Diabetic macular edema of both eyes    Age-related nuclear cataract of both eyes    Psoriasis  Comments:  follows with derm, no changes to current management        Health maintenance reviewed with patient. Patient is due for vaccinations. Declines today.    Follow up in about 4 months (around 2/9/2025).    Croey Hernandez MD  Internal Medicine / Primary Care  [facility]  10/9/2024

## 2024-10-09 NOTE — PATIENT INSTRUCTIONS
Labwork in 4 months.     Schedule mammogram    Colonoscopy - a colonoscopy has been ordered for you. In order to schedule this appointment, please call [phone].     Return to clinic as scheduled.

## 2024-10-10 DIAGNOSIS — E11.65 TYPE 2 DIABETES MELLITUS WITH HYPERGLYCEMIA, WITHOUT LONG-TERM CURRENT USE OF INSULIN: ICD-10-CM

## 2024-10-10 RX ORDER — LOSARTAN POTASSIUM 25 MG/1
12.5 TABLET ORAL
Qty: 45 TABLET | Refills: 3 | Status: SHIPPED | OUTPATIENT
Start: 2024-10-10

## 2024-10-10 NOTE — TELEPHONE ENCOUNTER
No care due was identified.  [facility] Embedded Care Due Messages. Reference number: [id number].   10/10/2024 1:55:14 AM CDT

## 2024-10-10 NOTE — TELEPHONE ENCOUNTER
Refill Decision Note   Shelley Levy  is requesting a refill authorization.  Brief Assessment and Rationale for Refill:  Approve     Medication Therapy Plan:         Comments:     Note composed:12:28 PM 10/10/2024

## 2024-10-11 DIAGNOSIS — E11.65 TYPE 2 DIABETES MELLITUS WITH HYPERGLYCEMIA, WITHOUT LONG-TERM CURRENT USE OF INSULIN: ICD-10-CM

## 2024-10-11 RX ORDER — METFORMIN HYDROCHLORIDE 500 MG/1
1000 TABLET, EXTENDED RELEASE ORAL 2 TIMES DAILY WITH MEALS
Qty: 360 TABLET | Refills: 1 | Status: SHIPPED | OUTPATIENT
Start: 2024-10-11

## 2024-10-11 NOTE — TELEPHONE ENCOUNTER
Refill Decision Note   Shelley Levy  is requesting a refill authorization.  Brief Assessment and Rationale for Refill:  Approve     Medication Therapy Plan:         Comments:     Note composed:6:17 AM 10/11/2024

## 2024-10-11 NOTE — TELEPHONE ENCOUNTER
No care due was identified.  [facility] Embedded Care Due Messages. Reference number: [id number].   10/11/2024 12:30:55 AM CDT

## 2024-10-18 DIAGNOSIS — E11.65 TYPE 2 DIABETES MELLITUS WITH HYPERGLYCEMIA, WITHOUT LONG-TERM CURRENT USE OF INSULIN: ICD-10-CM

## 2024-10-18 RX ORDER — PEN NEEDLE, DIABETIC 30 GX3/16"
NEEDLE, DISPOSABLE MISCELLANEOUS
Qty: 100 EACH | Refills: 3 | Status: SHIPPED | OUTPATIENT
Start: 2024-10-18

## 2024-10-18 NOTE — TELEPHONE ENCOUNTER
No care due was identified.  [facility] Embedded Care Due Messages. Reference number: [id number].   10/18/2024 12:33:09 AM CDT

## 2024-10-18 NOTE — TELEPHONE ENCOUNTER
Refill Decision Note   Shelley Levy  is requesting a refill authorization.  Brief Assessment and Rationale for Refill:  Approve     Medication Therapy Plan:         Comments:     Note composed:3:38 PM 10/18/2024

## 2024-10-24 ENCOUNTER — OFFICE VISIT (OUTPATIENT)
Dept: NEUROLOGY | Facility: CLINIC | Age: 64
End: 2024-10-24
Payer: COMMERCIAL

## 2024-10-24 VITALS — HEART RATE: 73 BPM | DIASTOLIC BLOOD PRESSURE: 78 MMHG | SYSTOLIC BLOOD PRESSURE: 125 MMHG

## 2024-10-24 DIAGNOSIS — G62.9 NEUROPATHY: Primary | ICD-10-CM

## 2024-10-24 PROCEDURE — 4010F ACE/ARB THERAPY RXD/TAKEN: CPT | Mod: CPTII,S$GLB,, | Performed by: PSYCHIATRY & NEUROLOGY

## 2024-10-24 PROCEDURE — 3066F NEPHROPATHY DOC TX: CPT | Mod: CPTII,S$GLB,, | Performed by: PSYCHIATRY & NEUROLOGY

## 2024-10-24 PROCEDURE — 99999 PR PBB SHADOW E&M-EST. PATIENT-LVL IV: CPT | Mod: PBBFAC,,, | Performed by: PSYCHIATRY & NEUROLOGY

## 2024-10-24 PROCEDURE — 3074F SYST BP LT 130 MM HG: CPT | Mod: CPTII,S$GLB,, | Performed by: PSYCHIATRY & NEUROLOGY

## 2024-10-24 PROCEDURE — 1159F MED LIST DOCD IN RCRD: CPT | Mod: CPTII,S$GLB,, | Performed by: PSYCHIATRY & NEUROLOGY

## 2024-10-24 PROCEDURE — 3044F HG A1C LEVEL LT 7.0%: CPT | Mod: CPTII,S$GLB,, | Performed by: PSYCHIATRY & NEUROLOGY

## 2024-10-24 PROCEDURE — 3078F DIAST BP <80 MM HG: CPT | Mod: CPTII,S$GLB,, | Performed by: PSYCHIATRY & NEUROLOGY

## 2024-10-24 PROCEDURE — 3061F NEG MICROALBUMINURIA REV: CPT | Mod: CPTII,S$GLB,, | Performed by: PSYCHIATRY & NEUROLOGY

## 2024-10-24 PROCEDURE — 99213 OFFICE O/P EST LOW 20 MIN: CPT | Mod: S$GLB,,, | Performed by: PSYCHIATRY & NEUROLOGY

## 2024-10-24 RX ORDER — PREGABALIN 100 MG/1
100 CAPSULE ORAL 3 TIMES DAILY
Qty: 270 CAPSULE | Refills: 2 | Status: SHIPPED | OUTPATIENT
Start: 2024-10-24 | End: 2025-07-21

## 2024-10-31 ENCOUNTER — OFFICE VISIT (OUTPATIENT)
Dept: OPHTHALMOLOGY | Facility: CLINIC | Age: 64
End: 2024-10-31
Payer: COMMERCIAL

## 2024-10-31 ENCOUNTER — CLINICAL SUPPORT (OUTPATIENT)
Dept: OPHTHALMOLOGY | Facility: CLINIC | Age: 64
End: 2024-10-31
Payer: COMMERCIAL

## 2024-10-31 DIAGNOSIS — H35.033 HYPERTENSIVE RETINOPATHY OF BOTH EYES: ICD-10-CM

## 2024-10-31 DIAGNOSIS — H25.13 AGE-RELATED NUCLEAR CATARACT OF BOTH EYES: ICD-10-CM

## 2024-10-31 DIAGNOSIS — E11.3313 MODERATE NONPROLIFERATIVE DIABETIC RETINOPATHY OF BOTH EYES WITH MACULAR EDEMA ASSOCIATED WITH TYPE 2 DIABETES MELLITUS: Primary | ICD-10-CM

## 2024-10-31 DIAGNOSIS — H43.813 VITREOUS DEGENERATION OF BOTH EYES: ICD-10-CM

## 2024-10-31 PROCEDURE — 99999 PR PBB SHADOW E&M-EST. PATIENT-LVL IV: CPT | Mod: PBBFAC,,, | Performed by: OPHTHALMOLOGY

## 2024-10-31 PROCEDURE — 99999 PR PBB SHADOW E&M-EST. PATIENT-LVL I: CPT | Mod: PBBFAC,,,

## 2024-10-31 RX ADMIN — Medication 1.25 MG: at 02:10

## 2024-11-04 ENCOUNTER — TELEPHONE (OUTPATIENT)
Dept: PHARMACY | Facility: CLINIC | Age: 64
End: 2024-11-04
Payer: COMMERCIAL

## 2024-11-04 NOTE — TELEPHONE ENCOUNTER
[facility] Chart Review & Patient Outreach Details For Medication Adherence Project    Reason for Outreach Encounter: 3rd Party payor non-compliance report ([employer], [employer], [employer], etc)  2.  Patient Outreach Method: Reviewed patient chart   3.   Medication in question: pravastatin     pravastatin  last filled  8/14/24 for 90 day supply    4.  Reviewed and or Updates Made To: Patient Chart  5. Outreach Outcomes and/or actions taken: Patient filled medication and is on track to be adherent  Additional Notes:

## 2024-11-11 ENCOUNTER — PATIENT MESSAGE (OUTPATIENT)
Dept: OPHTHALMOLOGY | Facility: CLINIC | Age: 64
End: 2024-11-11
Payer: COMMERCIAL

## 2024-11-11 ENCOUNTER — TELEPHONE (OUTPATIENT)
Dept: OPHTHALMOLOGY | Facility: CLINIC | Age: 64
End: 2024-11-11
Payer: COMMERCIAL

## 2024-11-11 NOTE — TELEPHONE ENCOUNTER
Raya Villarreal [facility] Staff  Good afternoon,,    Patient Plan care continuum is advising that member : 1960.is invalid in there system and patient needs to call member services  [phone] to get  updated they are displaying something different. Please advise member services once updated please send electronic update to care continuum so that we can process authorization at this time.    Kind Regards,  Raya ROSS  Sent message to pt Via portal.

## 2024-11-19 ENCOUNTER — PATIENT MESSAGE (OUTPATIENT)
Dept: NEUROLOGY | Facility: CLINIC | Age: 64
End: 2024-11-19
Payer: COMMERCIAL

## 2024-11-20 ENCOUNTER — HOSPITAL ENCOUNTER (OUTPATIENT)
Dept: RADIOLOGY | Facility: HOSPITAL | Age: 64
Discharge: HOME | End: 2024-11-20
Attending: INTERNAL MEDICINE
Payer: COMMERCIAL

## 2024-11-20 DIAGNOSIS — Z12.31 BREAST CANCER SCREENING BY MAMMOGRAM: ICD-10-CM

## 2024-11-20 PROCEDURE — 77067 SCR MAMMO BI INCL CAD: CPT | Mod: TC

## 2024-12-02 ENCOUNTER — TELEPHONE (OUTPATIENT)
Dept: OPHTHALMOLOGY | Facility: CLINIC | Age: 64
End: 2024-12-02
Payer: COMMERCIAL

## 2024-12-02 ENCOUNTER — PATIENT MESSAGE (OUTPATIENT)
Dept: OPHTHALMOLOGY | Facility: CLINIC | Age: 64
End: 2024-12-02
Payer: COMMERCIAL

## 2024-12-02 NOTE — TELEPHONE ENCOUNTER
Per rep Mel MILLER/care continuum is advising that member : 1960.is invalid in there system and patient needs to call member services  [phone] to get  updated they are displaying something different. Please advise member services once updated please send electronic update to care continuum so that we can process authorization at this time.     Called pt left message and sent my chart message.

## 2024-12-02 NOTE — TELEPHONE ENCOUNTER
Spoke with pt she has spoke with the insurance company. She states her  is correct per her insurance company.   I advised her I can not change her  with her insurance company.

## 2024-12-02 NOTE — TELEPHONE ENCOUNTER
----- Message from Aye sent at 2024  1:36 PM CST -----  Regarding: Returning a Missed Call        Name Of Caller:   Herb (Pt's )      Contact Preference:   [phone]      Nature of call:   Requesting to speak with Josefina about pt's  issues with insurance. He contacted them and was advised to reach to Dr. Tran's office. He has some concerns.  Called  left message.

## 2024-12-04 NOTE — PROGRESS NOTES
HPI    1 month OCT/DFE OU/poss YVAN OU    DLS: 10/31/2024 by Dr. ASH Tran MD    Pt states no new changes since last visit.      - flashes   +Floaters OU   - pain   +dry eye   +irritation     Gtts: Thera Tears BID-QID OU     POHx:  1. Moderate nonproliferative diabetic retinopathy of both eyes with   macular edema associated with type 2 diabetes mellitus    2. Vitreous degeneration of both eyes    3. Hypertensive retinopathy of both eyes    4. Age-related nuclear cataract of both eyes    Last edited by Carol Bonds on 12/5/2024  2:32 PM.            A/P    ICD-10-CM ICD-9-CM   1. 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 Vitreous degeneration of both eyes  H43.813 379.21   3. Hypertensive retinopathy of both eyes  H35.033 362.11   4. Age-related nuclear cataract of both eyes  H25.13 366.16         1. Moderate nonproliferative diabetic retinopathy of both eyes with macular edema associated with type 2 diabetes mellitus  2. Diabetic macular edema of both eyes  PCP  Corey Hernandez MD - Recent notes reviewed   08/23/2024  5.5  A1C     Pt's  currently in hospital for sepsis    OD: s/p NEFTALY x3 10/31/24  [facility] 20/40 (was 20/30), mod DR with better DME I   Plan: improved foveal DME, will send for updated Mrx, monitor for now no injection    OS: s/p NEFTALY x3 10/31/24  [place] 20/125 (Was 20/60), hx of amblyopia, better IRF today   Plan: monitor for now, can recheck 2 mo if needs Avastin     Visit today is associated with current or anticipated ongoing medical care related to this patients single serious condition/complex condition (diabetic macular edema)     Recommend good blood pressure control, tight blood glucose control, and good cholesterol control     3. Vitreous degeneration of both eyes  No RT/RD  Plan: Observation      4. Hypertensive retinopathy of both eyes  Mild HTN changes  Plan:  Observation  Recommend good blood pressure control, tight blood glucose control, and good cholesterol control     5. Age-related nuclear cataract of both eyes  VS NS  Amblyopia OS  Plan: can update Mrx      RTC 2 mo DFE/OCTm OU, poss Avastin OU        I saw and examined the patient and reviewed in detail the findings documented. The final examination findings, image interpretations which have been independently interpreted, and plan as documented in the record represent my personal judgment and conclusions.    Amari Tran MD  Vitreoretinal Surgery   [facility]

## 2024-12-05 ENCOUNTER — PROCEDURE VISIT (OUTPATIENT)
Dept: OPHTHALMOLOGY | Facility: CLINIC | Age: 64
End: 2024-12-05
Payer: COMMERCIAL

## 2024-12-05 DIAGNOSIS — E11.3313 MODERATE NONPROLIFERATIVE DIABETIC RETINOPATHY OF BOTH EYES WITH MACULAR EDEMA ASSOCIATED WITH TYPE 2 DIABETES MELLITUS: Primary | ICD-10-CM

## 2024-12-05 DIAGNOSIS — H35.033 HYPERTENSIVE RETINOPATHY OF BOTH EYES: ICD-10-CM

## 2024-12-05 DIAGNOSIS — H25.13 AGE-RELATED NUCLEAR CATARACT OF BOTH EYES: ICD-10-CM

## 2024-12-05 DIAGNOSIS — H43.813 VITREOUS DEGENERATION OF BOTH EYES: ICD-10-CM

## 2024-12-05 PROCEDURE — 92134 CPTRZ OPH DX IMG PST SGM RTA: CPT | Mod: S$GLB,,, | Performed by: OPHTHALMOLOGY

## 2024-12-05 PROCEDURE — G2211 COMPLEX E/M VISIT ADD ON: HCPCS | Mod: S$GLB,,, | Performed by: OPHTHALMOLOGY

## 2024-12-05 PROCEDURE — 99213 OFFICE O/P EST LOW 20 MIN: CPT | Mod: S$GLB,,, | Performed by: OPHTHALMOLOGY

## 2024-12-05 PROCEDURE — 92202 OPSCPY EXTND ON/MAC DRAW: CPT | Mod: 59,S$GLB,, | Performed by: OPHTHALMOLOGY

## 2024-12-12 ENCOUNTER — TELEPHONE (OUTPATIENT)
Dept: PHARMACY | Facility: CLINIC | Age: 64
End: 2024-12-12
Payer: COMMERCIAL

## 2024-12-12 NOTE — TELEPHONE ENCOUNTER
[facility] Chart Review & Patient Outreach Details For Medication Adherence Project    Reason for Outreach Encounter: 3rd Party payor non-compliance report ([employer], [employer], C, etc)  2.  Patient Outreach Method: Reviewed Patient Chart  3.   Medication in question: pravastatin    LAST FILLED: 11/7/24 for 90 day supply  Hyperlipidemia Medications               pravastatin (PRAVACHOL) 20 MG tablet TAKE 1 TABLET BY MOUTH EVERY DAY               4.  Reviewed and or Updates Made To: Patient Chart  5. Outreach Outcomes and/or actions taken: Patient filled medication and is on track to be adherent

## 2024-12-17 ENCOUNTER — LAB VISIT (OUTPATIENT)
Dept: LAB | Facility: HOSPITAL | Age: 64
End: 2024-12-17
Attending: INTERNAL MEDICINE
Payer: COMMERCIAL

## 2024-12-17 DIAGNOSIS — E11.42 TYPE 2 DIABETES MELLITUS WITH DIABETIC POLYNEUROPATHY, WITH LONG-TERM CURRENT USE OF INSULIN: ICD-10-CM

## 2024-12-17 DIAGNOSIS — E78.5 DYSLIPIDEMIA: ICD-10-CM

## 2024-12-17 DIAGNOSIS — Z79.4 TYPE 2 DIABETES MELLITUS WITH DIABETIC POLYNEUROPATHY, WITH LONG-TERM CURRENT USE OF INSULIN: ICD-10-CM

## 2024-12-17 LAB
ALBUMIN SERPL BCP-MCNC: 3.6 G/DL (ref 3.5–5.2)
ALP SERPL-CCNC: 88 U/L (ref 40–150)
ALT SERPL W/O P-5'-P-CCNC: 21 U/L (ref 10–44)
ANION GAP SERPL CALC-SCNC: 9 MMOL/L (ref 8–16)
AST SERPL-CCNC: 15 U/L (ref 10–40)
BASOPHILS # BLD AUTO: 0.04 K/UL (ref 0–0.2)
BASOPHILS NFR BLD: 0.5 % (ref 0–1.9)
BILIRUB SERPL-MCNC: 0.4 MG/DL (ref 0.1–1)
BUN SERPL-MCNC: 17 MG/DL (ref 8–23)
CALCIUM SERPL-MCNC: 8.9 MG/DL (ref 8.7–10.5)
CHLORIDE SERPL-SCNC: 107 MMOL/L (ref 95–110)
CHOLEST SERPL-MCNC: 160 MG/DL (ref 120–199)
CHOLEST/HDLC SERPL: 4.3 {RATIO} (ref 2–5)
CO2 SERPL-SCNC: 24 MMOL/L (ref 23–29)
CREAT SERPL-MCNC: 0.8 MG/DL (ref 0.5–1.4)
DIFFERENTIAL METHOD BLD: ABNORMAL
EOSINOPHIL # BLD AUTO: 0.2 K/UL (ref 0–0.5)
EOSINOPHIL NFR BLD: 1.9 % (ref 0–8)
ERYTHROCYTE [DISTWIDTH] IN BLOOD BY AUTOMATED COUNT: 13.5 % (ref 11.5–14.5)
EST. GFR  (NO RACE VARIABLE): >60 ML/MIN/1.73 M^2
ESTIMATED AVG GLUCOSE: 128 MG/DL (ref 68–131)
GLUCOSE SERPL-MCNC: 151 MG/DL (ref 70–110)
HBA1C MFR BLD: 6.1 % (ref 4–5.6)
HCT VFR BLD AUTO: 35.9 % (ref 37–48.5)
HDLC SERPL-MCNC: 37 MG/DL (ref 40–75)
HDLC SERPL: 23.1 % (ref 20–50)
HGB BLD-MCNC: 11 G/DL (ref 12–16)
IMM GRANULOCYTES # BLD AUTO: 0.03 K/UL (ref 0–0.04)
IMM GRANULOCYTES NFR BLD AUTO: 0.4 % (ref 0–0.5)
LDLC SERPL CALC-MCNC: 91.8 MG/DL (ref 63–159)
LYMPHOCYTES # BLD AUTO: 1.8 K/UL (ref 1–4.8)
LYMPHOCYTES NFR BLD: 22.7 % (ref 18–48)
MCH RBC QN AUTO: 28.4 PG (ref 27–31)
MCHC RBC AUTO-ENTMCNC: 30.6 G/DL (ref 32–36)
MCV RBC AUTO: 93 FL (ref 82–98)
MONOCYTES # BLD AUTO: 0.5 K/UL (ref 0.3–1)
MONOCYTES NFR BLD: 6.6 % (ref 4–15)
NEUTROPHILS # BLD AUTO: 5.4 K/UL (ref 1.8–7.7)
NEUTROPHILS NFR BLD: 67.9 % (ref 38–73)
NONHDLC SERPL-MCNC: 123 MG/DL
NRBC BLD-RTO: 0 /100 WBC
PLATELET # BLD AUTO: 199 K/UL (ref 150–450)
PMV BLD AUTO: 10.2 FL (ref 9.2–12.9)
POTASSIUM SERPL-SCNC: 4.7 MMOL/L (ref 3.5–5.1)
PROT SERPL-MCNC: 6.8 G/DL (ref 6–8.4)
RBC # BLD AUTO: 3.88 M/UL (ref 4–5.4)
SODIUM SERPL-SCNC: 140 MMOL/L (ref 136–145)
TRIGL SERPL-MCNC: 156 MG/DL (ref 30–150)
TSH SERPL DL<=0.005 MIU/L-ACNC: 2.11 UIU/ML (ref 0.4–4)
WBC # BLD AUTO: 7.99 K/UL (ref 3.9–12.7)

## 2024-12-17 PROCEDURE — 80061 LIPID PANEL: CPT | Performed by: INTERNAL MEDICINE

## 2024-12-17 PROCEDURE — 83036 HEMOGLOBIN GLYCOSYLATED A1C: CPT | Performed by: INTERNAL MEDICINE

## 2024-12-17 PROCEDURE — 85025 COMPLETE CBC W/AUTO DIFF WBC: CPT | Performed by: INTERNAL MEDICINE

## 2024-12-17 PROCEDURE — 84443 ASSAY THYROID STIM HORMONE: CPT | Performed by: INTERNAL MEDICINE

## 2024-12-17 PROCEDURE — 80053 COMPREHEN METABOLIC PANEL: CPT | Performed by: INTERNAL MEDICINE

## 2024-12-18 ENCOUNTER — PATIENT MESSAGE (OUTPATIENT)
Dept: ENDOSCOPY | Facility: HOSPITAL | Age: 64
End: 2024-12-18

## 2024-12-18 ENCOUNTER — TELEPHONE (OUTPATIENT)
Dept: ENDOSCOPY | Facility: HOSPITAL | Age: 64
End: 2024-12-18

## 2024-12-18 ENCOUNTER — CLINICAL SUPPORT (OUTPATIENT)
Dept: ENDOSCOPY | Facility: HOSPITAL | Age: 64
End: 2024-12-18
Attending: INTERNAL MEDICINE
Payer: COMMERCIAL

## 2024-12-18 DIAGNOSIS — Z12.11 SCREENING FOR COLON CANCER: ICD-10-CM

## 2024-12-18 RX ORDER — SODIUM, POTASSIUM,MAG SULFATES 17.5-3.13G
1 SOLUTION, RECONSTITUTED, ORAL ORAL DAILY
Qty: 1 KIT | Refills: 0 | Status: SHIPPED | OUTPATIENT
Start: 2024-12-18 | End: 2024-12-20

## 2024-12-18 NOTE — TELEPHONE ENCOUNTER
Referral for procedure from PAT appointment      Spoke to patient to schedule procedure(s) Colonoscopy       Physician to perform procedure(s) Dr. ERICK Kulkarni  Date of Procedure (s) 2/20/25  Arrival Time 11:30 AM  Time of Procedure(s) 12:30 PM   Location of Procedure(s) [place] [address] Floor  Type of Rx Prep sent to patient: Suprep  Instructions provided to [facility] via [facility]    Patient was informed on the following information and verbalized understanding. Screening questionnaire reviewed with patient and complete. If procedure requires anesthesia, a responsible adult needs to be present to accompany the patient home, patient cannot drive after receiving anesthesia. Appointment details are tentative, especially check-in time. Patient will receive a prep-op call 7 days prior to confirm check-in time for procedure. If applicable the patient should contact their pharmacy to verify Rx for procedure prep is ready for pick-up. Patient was advised to call the scheduling department at [phone] if pharmacy states no Rx is available. Patient was advised to call the endoscopy scheduling department if any questions or concerns arise.      [facility] Scheduling Department

## 2024-12-19 ENCOUNTER — OFFICE VISIT (OUTPATIENT)
Dept: INTERNAL MEDICINE | Facility: CLINIC | Age: 64
End: 2024-12-19
Payer: COMMERCIAL

## 2024-12-19 VITALS
BODY MASS INDEX: 37.39 KG/M2 | OXYGEN SATURATION: 95 % | HEIGHT: 57 IN | WEIGHT: 173.31 LBS | HEART RATE: 91 BPM | SYSTOLIC BLOOD PRESSURE: 126 MMHG | DIASTOLIC BLOOD PRESSURE: 78 MMHG

## 2024-12-19 DIAGNOSIS — Z09 FOLLOW-UP EXAM: Primary | ICD-10-CM

## 2024-12-19 DIAGNOSIS — H91.92 HEARING LOSS OF LEFT EAR, UNSPECIFIED HEARING LOSS TYPE: ICD-10-CM

## 2024-12-19 DIAGNOSIS — R60.0 BILATERAL LOWER EXTREMITY EDEMA: ICD-10-CM

## 2024-12-19 DIAGNOSIS — I87.2 VENOUS INSUFFICIENCY: ICD-10-CM

## 2024-12-19 DIAGNOSIS — H91.93 BILATERAL HEARING LOSS, UNSPECIFIED HEARING LOSS TYPE: ICD-10-CM

## 2024-12-19 PROCEDURE — 4010F ACE/ARB THERAPY RXD/TAKEN: CPT | Mod: CPTII,S$GLB,, | Performed by: INTERNAL MEDICINE

## 2024-12-19 PROCEDURE — 1159F MED LIST DOCD IN RCRD: CPT | Mod: CPTII,S$GLB,, | Performed by: INTERNAL MEDICINE

## 2024-12-19 PROCEDURE — 99214 OFFICE O/P EST MOD 30 MIN: CPT | Mod: S$GLB,,, | Performed by: INTERNAL MEDICINE

## 2024-12-19 PROCEDURE — 3066F NEPHROPATHY DOC TX: CPT | Mod: CPTII,S$GLB,, | Performed by: INTERNAL MEDICINE

## 2024-12-19 PROCEDURE — 3044F HG A1C LEVEL LT 7.0%: CPT | Mod: CPTII,S$GLB,, | Performed by: INTERNAL MEDICINE

## 2024-12-19 PROCEDURE — 3078F DIAST BP <80 MM HG: CPT | Mod: CPTII,S$GLB,, | Performed by: INTERNAL MEDICINE

## 2024-12-19 PROCEDURE — 99999 PR PBB SHADOW E&M-EST. PATIENT-LVL V: CPT | Mod: PBBFAC,,, | Performed by: INTERNAL MEDICINE

## 2024-12-19 PROCEDURE — 1160F RVW MEDS BY RX/DR IN RCRD: CPT | Mod: CPTII,S$GLB,, | Performed by: INTERNAL MEDICINE

## 2024-12-19 PROCEDURE — 3074F SYST BP LT 130 MM HG: CPT | Mod: CPTII,S$GLB,, | Performed by: INTERNAL MEDICINE

## 2024-12-19 PROCEDURE — 3008F BODY MASS INDEX DOCD: CPT | Mod: CPTII,S$GLB,, | Performed by: INTERNAL MEDICINE

## 2024-12-19 PROCEDURE — 3061F NEG MICROALBUMINURIA REV: CPT | Mod: CPTII,S$GLB,, | Performed by: INTERNAL MEDICINE

## 2024-12-19 RX ORDER — FUROSEMIDE 20 MG/1
20 TABLET ORAL DAILY
Qty: 30 TABLET | Refills: 0 | Status: SHIPPED | OUTPATIENT
Start: 2024-12-19 | End: 2025-01-18

## 2024-12-19 NOTE — PATIENT INSTRUCTIONS
"Schedule ENT appointment  Schedule audiology appointment    Start lasix 20 mg daily for 10 days, OK to extend if needed. 30 day prescription sent to pharmacy.     Nereyda Elastic Tubular Bandage - large knee medium thigh "Size F" ordered to [facility] - will see if your insurance approves this.     Take your old compression stockings back to [facility] on [address] [facility] and see if they are able to change them out for a petite size. If they are not able to, please ask them if you need a new order or something else. Let my office know if you need a new order.     Return to clinic in 3 months or sooner if needed.   "

## 2024-12-19 NOTE — PROGRESS NOTES
Subjective:       Patient ID: Shelley Levy is a 64 y.o. female.    Chief Complaint: Leg Swelling and Ear Fullness      HPI  Shelley Levy is a 64 y.o. year old female with t2DM, diabetic retinopathy, diabetic neuropathy, HTN, HLD, chronic venous insufficiency presents for follow up. Complaints of leg swelling and ear fullness since URI. States hearing is diminished bilaterally.     Review of Systems   Constitutional:  Negative for activity change, appetite change, fatigue, fever and unexpected weight change.   HENT:  Negative for congestion, hearing loss, postnasal drip, sneezing, sore throat, trouble swallowing and voice change.    Eyes:  Negative for pain and discharge.   Respiratory:  Negative for cough, choking, chest tightness, shortness of breath and wheezing.    Cardiovascular:  Positive for leg swelling. Negative for chest pain and palpitations.   Gastrointestinal:  Negative for abdominal distention, abdominal pain, blood in stool, constipation, diarrhea, nausea and vomiting.   Endocrine: Negative for polydipsia and polyuria.   Genitourinary:  Negative for difficulty urinating and flank pain.   Musculoskeletal:  Negative for arthralgias, back pain, joint swelling, myalgias and neck pain.   Skin:  Negative for rash.   Neurological:  Negative for dizziness, tremors, seizures, weakness, numbness and headaches.   Psychiatric/Behavioral:  Negative for agitation. The patient is not nervous/anxious.          Past Medical History:   Diagnosis Date    Type 2 diabetes mellitus with neurologic complication 9/5/2023        Prior to Admission medications    Medication Sig Start Date End Date Taking? Authorizing Provider   acetaminophen-codeine 300-30mg (TYLENOL #3) 300-30 mg Tab take 1 tablet by mouth every 4 hours 5/15/24  Yes    amitriptyline (ELAVIL) 10 MG tablet TAKE 1 TABLET BY MOUTH NIGHTLY AS NEEDED FOR INSOMNIA. 5/20/24  Yes Yossi Prado DPM   betamethasone dipropionate (DIPROLENE) 0.05 %  ointment Apply topically 2 (two) times daily. To rash on left leg 24  Yes Ruth Moody MD   blood sugar diagnostic Strp To check BG 4 times daily, to use with insurance preferred meter 23  Yes Corey Hernandez MD   chlorhexidine (PERIDEX) 0.12 % solution SMARTSI.5 Ounce(s) By Mouth Twice Daily 10/3/24  Yes Provider, Historical   clobetasoL (TEMOVATE) 0.05 % cream Apply topically 2 (two) times daily. Use two weeks then take a one week break. Repeat in 2 week intervals. 23  Yes Joyce Adamson MD   clobetasoL (TEMOVATE) 0.05 % external solution Apply topically 2 (two) times daily. Use two weeks then take a one week break. Repeat in 2 week intervals. 23  Yes Joyce Adamson MD   collagenase (SANTYL) ointment Apply topically every other day. 24  Yes    collagenase (SANTYL) ointment Apply topically daily 5/15/24  Yes    COSENTYX PEN, 2 PENS, 150 mg/mL PnIj Inject 300mg into the skin weekly x 5 weeks, then inject 300mg once monthly 6/10/24  Yes Ruth Moody MD   COSENTYX PEN, 2 PENS, 150 mg/mL PnIj Inject 300 mg into the skin every 28 days. 6/10/24  Yes Ruth Moody MD   cyanocobalamin (VITAMIN B-12) 1000 MCG tablet Take 1 tablet (1,000 mcg total) by mouth once daily. 23  Yes Corey Hernandez MD   fluticasone propionate (FLONASE) 50 mcg/actuation nasal spray SPRAY 2 SPRAYS BY EACH NOSTRIL ROUTE ONCE DAILY. 24  Yes Corey Hernandez MD   gabapentin (NEURONTIN) 100 MG capsule Take 100 mg by mouth 3 (three) times daily. 24  Yes Provider, Historical   gabapentin (NEURONTIN) 300 MG capsule Take 2 capsules (600 mg total) by mouth 3 (three) times daily. 24 Yes Corey Hernandez MD   gentamicin (GARAMYCIN) 0.1 % ointment Apply topically every other day. 24  Yes    gentamicin (GARAMYCIN) 0.1 % ointment Apply topically daily as directed 5/15/24  Yes    ibuprofen (ADVIL,MOTRIN) 800 MG tablet Take 800 mg by mouth every 8 (eight) hours as needed. 10/3/24  Yes Provider, Historical   insulin  "glargine U-100, Lantus, (LANTUS SOLOSTAR U-100 INSULIN) 100 unit/mL (3 mL) InPn pen Inject 10 Units into the skin every evening. Keep same dose as levemir. 8/13/24 4/5/26 Yes Corey Hernandez MD   ipratropium (ATROVENT) 21 mcg (0.03 %) nasal spray INSTILL 2 SPRAYS INTO EACH NOSTRIL 3 TIMES A DAY 4/4/24  Yes Corey Hernandez MD   ketoconazole (NIZORAL) 2 % cream APPLY TOPICALLY 2 (TWO) TIMES DAILY. TO RASH UNDER BREASTS AND ON BACK 7/19/24  Yes Ruth Moody MD   ketoconazole (NIZORAL) 2 % shampoo Apply topically once daily. Use in shower on rash, then rinse. 7/11/23  Yes Ruth Moody MD   lancets Misc To check BG 4 times daily, to use with insurance preferred meter 9/25/23  Yes Corey Hernandez MD   levocetirizine (XYZAL) 5 MG tablet Take 1 tablet (5 mg total) by mouth every evening. 8/16/24 8/16/25 Yes Marianna Lira PA-C   LIDOcaine-prilocaine (EMLA) cream Apply topically as needed. 2/22/24  Yes Yossi Prado DPM   losartan (COZAAR) 25 MG tablet TAKE 1/2 TABLET BY MOUTH ONCE DAILY 10/10/24  Yes Corey Hernandez MD   metFORMIN (GLUCOPHAGE-XR) 500 MG ER 24hr tablet TAKE 2 TABLETS BY MOUTH TWICE A DAY WITH MEALS 10/11/24  Yes Corey Hernandez MD   mupirocin (BACTROBAN) 2 % ointment apply topically daily as directed 5/15/24  Yes    pen needle, diabetic (BD PRECIOUS 2ND GEN PEN NEEDLE) 32 gauge x 5/32" Ndle Use to administer insulin under the skin one (1) time a day; discard pen needle after each use. 10/18/24  Yes Corey Hernandez MD   pravastatin (PRAVACHOL) 20 MG tablet TAKE 1 TABLET BY MOUTH EVERY DAY 8/19/24  Yes Corey Hernandez MD   pregabalin (LYRICA) 100 MG capsule Take 1 capsule (100 mg total) by mouth 3 (three) times daily. 10/24/24 7/21/25 Yes Adan Richardson MD   sodium,potassium,mag sulfates (SUPREP BOWEL PREP KIT) 17.5-3.13-1.6 gram SolR Take 177 mLs by mouth once daily. for 2 days 12/18/24 12/20/24 Yes Iraj Kulkarni MD   terbinafine HCL (LAMISIL) 250 mg tablet TAKE 1 TABLET BY MOUTH EVERY DAY 11/23/23  Yes Yossi Prado " "VIN HAINES   TRUEPLUS LANCETS 33 gauge Misc Apply topically 4 (four) times daily. 7/25/23  Yes Provider, Historical   blood-glucose meter kit To check BG 4 times daily, to use with insurance preferred meter 7/25/23 7/24/24  Corey Hernandez MD   calcipotriene (DOVONOX) 0.005 % cream Apply topically 2 (two) times daily. To psoriasis 11/1/23 10/31/24  Ruth Moody MD   furosemide (LASIX) 20 MG tablet Take 1 tablet (20 mg total) by mouth once daily. for 20 days 8/2/24 8/22/24  Marianna Lira, SAMIRA        Past medical history, surgical history, and family medical history reviewed and updated as appropriate.    Medications and allergies reviewed.     Objective:          Vitals:    12/19/24 1406   BP: 126/78   BP Location: Right arm   Patient Position: Sitting   Pulse: 91   SpO2: 95%   Weight: 78.6 kg (173 lb 4.5 oz)   Height: 4' 9" (1.448 m)     Body mass index is 37.5 kg/m².  Physical Exam  Constitutional:       Appearance: Normal appearance. She is well-developed. She is obese.   HENT:      Head: Normocephalic and atraumatic.      Comments: Cerumen adhering to left tympanic membrane at base. Sclerosis of TM noted  Eyes:      Extraocular Movements: Extraocular movements intact.      Pupils: Pupils are equal, round, and reactive to light.   Cardiovascular:      Rate and Rhythm: Normal rate and regular rhythm.      Heart sounds: Normal heart sounds.   Pulmonary:      Effort: Pulmonary effort is normal. No respiratory distress.      Breath sounds: Normal breath sounds. No wheezing.   Abdominal:      General: Bowel sounds are normal. There is no distension.      Palpations: Abdomen is soft.      Tenderness: There is no abdominal tenderness.   Musculoskeletal:         General: No tenderness. Normal range of motion.      Cervical back: Normal range of motion.      Right lower leg: Edema present.      Left lower leg: Edema present.   Skin:     General: Skin is warm and dry.   Neurological:      Mental Status: She is alert and " oriented to person, place, and time.      Cranial Nerves: No cranial nerve deficit.      Deep Tendon Reflexes: Reflexes are normal and symmetric.       Lab Results   Component Value Date    WBC 7.99 12/17/2024    HGB 11.0 (L) 12/17/2024    HCT 35.9 (L) 12/17/2024     12/17/2024    CHOL 160 12/17/2024    TRIG 156 (H) 12/17/2024    HDL 37 (L) 12/17/2024    ALT 21 12/17/2024    AST 15 12/17/2024     12/17/2024    K 4.7 12/17/2024     12/17/2024    CREATININE 0.8 12/17/2024    BUN 17 12/17/2024    CO2 24 12/17/2024    TSH 2.111 12/17/2024    HGBA1C 6.1 (H) 12/17/2024       Assessment:       1. Follow-up exam    2. Venous insufficiency    3. Bilateral lower extremity edema    4. Bilateral hearing loss, unspecified hearing loss type    5. Hearing loss of left ear, unspecified hearing loss type          Plan:     Shelley was seen today for leg swelling and ear fullness.    Diagnoses and all orders for this visit:    Follow-up exam    Venous insufficiency  -     HME - OTHER    Bilateral lower extremity edema  -     furosemide (LASIX) 20 MG tablet; Take 1 tablet (20 mg total) by mouth once daily.    Bilateral hearing loss, unspecified hearing loss type  -     Ambulatory referral/consult to Audiology; Future    Hearing loss of left ear, unspecified hearing loss type  -     Ambulatory referral/consult to ENT; Future    Lasix for swelling, patient to monitor edema while taking. Renal function normal.  Referral to ENT for evaluation of cerumen adherent to TM  Referral to audiology for hearing examination.     Health maintenance reviewed with patient.     Follow up in about 3 months (around 3/19/2025).    Corey Hernandez MD  Internal Medicine / Primary Care  [facility]  12/19/2024

## 2024-12-30 ENCOUNTER — LAB VISIT (OUTPATIENT)
Dept: LAB | Facility: HOSPITAL | Age: 64
End: 2024-12-30
Attending: DERMATOLOGY
Payer: COMMERCIAL

## 2024-12-30 ENCOUNTER — OFFICE VISIT (OUTPATIENT)
Dept: DERMATOLOGY | Facility: CLINIC | Age: 64
End: 2024-12-30
Payer: COMMERCIAL

## 2024-12-30 DIAGNOSIS — Z79.899 ENCOUNTER FOR LONG-TERM CURRENT USE OF HIGH RISK MEDICATION: ICD-10-CM

## 2024-12-30 DIAGNOSIS — L29.9 ITCHING: ICD-10-CM

## 2024-12-30 DIAGNOSIS — L28.0 LICHEN SIMPLEX CHRONICUS: ICD-10-CM

## 2024-12-30 DIAGNOSIS — Z79.899 ENCOUNTER FOR LONG-TERM CURRENT USE OF HIGH RISK MEDICATION: Primary | ICD-10-CM

## 2024-12-30 DIAGNOSIS — L40.9 PSORIASIS: ICD-10-CM

## 2024-12-30 PROCEDURE — 1159F MED LIST DOCD IN RCRD: CPT | Mod: CPTII,S$GLB,, | Performed by: DERMATOLOGY

## 2024-12-30 PROCEDURE — 99214 OFFICE O/P EST MOD 30 MIN: CPT | Mod: 25,S$GLB,, | Performed by: DERMATOLOGY

## 2024-12-30 PROCEDURE — 99999 PR PBB SHADOW E&M-EST. PATIENT-LVL III: CPT | Mod: PBBFAC,,, | Performed by: DERMATOLOGY

## 2024-12-30 PROCEDURE — 3044F HG A1C LEVEL LT 7.0%: CPT | Mod: CPTII,S$GLB,, | Performed by: DERMATOLOGY

## 2024-12-30 PROCEDURE — 4010F ACE/ARB THERAPY RXD/TAKEN: CPT | Mod: CPTII,S$GLB,, | Performed by: DERMATOLOGY

## 2024-12-30 PROCEDURE — 11901 INJECT SKIN LESIONS >7: CPT | Mod: S$GLB,,, | Performed by: DERMATOLOGY

## 2024-12-30 PROCEDURE — 3061F NEG MICROALBUMINURIA REV: CPT | Mod: CPTII,S$GLB,, | Performed by: DERMATOLOGY

## 2024-12-30 PROCEDURE — 86480 TB TEST CELL IMMUN MEASURE: CPT | Performed by: DERMATOLOGY

## 2024-12-30 PROCEDURE — 3066F NEPHROPATHY DOC TX: CPT | Mod: CPTII,S$GLB,, | Performed by: DERMATOLOGY

## 2024-12-30 RX ORDER — SECUKINUMAB 300 MG/2ML
INJECTION SUBCUTANEOUS
Qty: 2 ML | Refills: 6 | Status: ACTIVE | OUTPATIENT
Start: 2024-12-30

## 2024-12-30 NOTE — PROGRESS NOTES
Subjective:      Patient ID:  Shelley Levy is a 64 y.o. female who presents for   Chief Complaint   Patient presents with    Follow-up    Psoriasis     Psoriasis - Follow-up  Symptom course: unchanged  Affected locations: diffuse  Signs / symptoms: irritated and itching  Severity: worse at night      Here for Cosentyx f/u; taking 2 150 mg injections every month. Her PSO is much improved. Still has persistent plaque on left thigh and right post thigh. Scratches at frequently.  Tolerating medication well otherwise.  Has slight dry skin on arms as well.    Review of Systems   Constitutional:  Negative for fever.   HENT:  Negative for sore throat.    Gastrointestinal:  Negative for diarrhea.   Musculoskeletal:  Negative for arthralgias.   Skin:  Positive for itching and rash.       Objective:   Physical Exam   Constitutional: She appears well-developed and well-nourished. No distress.   Neurological: She is alert and oriented to person, place, and time. She is not disoriented.   Psychiatric: She has a normal mood and affect.   Skin:   Areas Examined (abnormalities noted in diagram):   Scalp / Hair Palpated and Inspected  Head / Face Inspection Performed  Neck Inspection Performed  Chest / Axilla Inspection Performed  Abdomen Inspection Performed  Genitals / Buttocks / Groin Inspection Performed  Back Inspection Performed  RUE Inspected  LUE Inspection Performed  RLE Inspected  LLE Inspection Performed  Nails and Digits Inspection Performed            Diagram Legend     Erythematous scaling macule/papule c/w actinic keratosis       Vascular papule c/w angioma      Pigmented verrucoid papule/plaque c/w seborrheic keratosis      Yellow umbilicated papule c/w sebaceous hyperplasia      Irregularly shaped tan macule c/w lentigo     1-2 mm smooth white papules consistent with Milia      Movable subcutaneous cyst with punctum c/w epidermal inclusion cyst      Subcutaneous movable cyst c/w pilar cyst      Firm pink to  brown papule c/w dermatofibroma      Pedunculated fleshy papule(s) c/w skin tag(s)      Evenly pigmented macule c/w junctional nevus     Mildly variegated pigmented, slightly irregular-bordered macule c/w mildly atypical nevus      Flesh colored to evenly pigmented papule c/w intradermal nevus       Pink pearly papule/plaque c/w basal cell carcinoma      Erythematous hyperkeratotic cursted plaque c/w SCC      Surgical scar with no sign of skin cancer recurrence      Open and closed comedones      Inflammatory papules and pustules      Verrucoid papule consistent consistent with wart     Erythematous eczematous patches and plaques     Dystrophic onycholytic nail with subungual debris c/w onychomycosis     Umbilicated papule    Erythematous-base heme-crusted tan verrucoid plaque consistent with inflamed seborrheic keratosis     Erythematous Silvery Scaling Plaque c/w Psoriasis     See annotation    Lab Results   Component Value Date    WBC 7.99 12/17/2024    HGB 11.0 (L) 12/17/2024    HCT 35.9 (L) 12/17/2024    MCV 93 12/17/2024     12/17/2024       CMP  Sodium   Date Value Ref Range Status   12/17/2024 140 136 - 145 mmol/L Final     Potassium   Date Value Ref Range Status   12/17/2024 4.7 3.5 - 5.1 mmol/L Final     Chloride   Date Value Ref Range Status   12/17/2024 107 95 - 110 mmol/L Final     CO2   Date Value Ref Range Status   12/17/2024 24 23 - 29 mmol/L Final     Glucose   Date Value Ref Range Status   12/17/2024 151 (H) 70 - 110 mg/dL Final     BUN   Date Value Ref Range Status   12/17/2024 17 8 - 23 mg/dL Final     Creatinine   Date Value Ref Range Status   12/17/2024 0.8 0.5 - 1.4 mg/dL Final     Calcium   Date Value Ref Range Status   12/17/2024 8.9 8.7 - 10.5 mg/dL Final     Total Protein   Date Value Ref Range Status   12/17/2024 6.8 6.0 - 8.4 g/dL Final     Albumin   Date Value Ref Range Status   12/17/2024 3.6 3.5 - 5.2 g/dL Final     Total Bilirubin   Date Value Ref Range Status   12/17/2024 0.4  0.1 - 1.0 mg/dL Final     Comment:     For infants and newborns, interpretation of results should be based  on gestational age, weight and in agreement with clinical  observations.    Premature Infant recommended reference ranges:  Up to 24 hours.............<8.0 mg/dL  Up to 48 hours............<12.0 mg/dL  3-5 days..................<15.0 mg/dL  6-29 days.................<15.0 mg/dL       Alkaline Phosphatase   Date Value Ref Range Status   12/17/2024 88 40 - 150 U/L Final     AST   Date Value Ref Range Status   12/17/2024 15 10 - 40 U/L Final     ALT   Date Value Ref Range Status   12/17/2024 21 10 - 44 U/L Final     Anion Gap   Date Value Ref Range Status   12/17/2024 9 8 - 16 mmol/L Final     eGFR   Date Value Ref Range Status   12/17/2024 >60.0 >60 mL/min/1.73 m^2 Final     Quant gold DRAWN AND PENDING   Assessment / Plan:        Encounter for long-term current use of high risk medication  -     QUANTIFERON GOLD TB; Future; Expected date: 12/30/2024    Lichen simplex chronicus    Psoriasis  -     QUANTIFERON GOLD TB; Future; Expected date: 12/30/2024  -     secukinumab (COSENTYX UNOREADY PEN) 300 mg/2 mL (150 mg/mL) PnIj; Inject 1 pen (300mg) SQ q month  Dispense: 2 mL; Refill: 6    Itching    Will continue Cosentyx - will change next dose to the 300 mg single injection monthly  Rx sent for this today  Quant gold pending other labs WNL    Pt with persistent plaques on left thigh and right post thigh -   LSC  Intralesional Kenalog 10mg/cc (2 cc total) injected into 10 lesions on the left thigh and right post thigh today after obtaining verbal consent including risk of surrounding hypopigmentation. Patient tolerated procedure well.    Units: 2  NDC for Kenalog 10mg/cc:  1428-5783-41           No follow-ups on file.3 mo

## 2024-12-31 LAB
GAMMA INTERFERON BACKGROUND BLD IA-ACNC: 0.01 IU/ML
M TB IFN-G CD4+ BCKGRND COR BLD-ACNC: 0.01 IU/ML
M TB IFN-G CD4+ BCKGRND COR BLD-ACNC: 0.03 IU/ML
MITOGEN IGNF BCKGRD COR BLD-ACNC: 9.99 IU/ML
TB GOLD PLUS: NEGATIVE

## 2025-01-10 DIAGNOSIS — R60.0 BILATERAL LOWER EXTREMITY EDEMA: ICD-10-CM

## 2025-01-10 NOTE — TELEPHONE ENCOUNTER
No care due was identified.  [facility] Embedded Care Due Messages. Reference number: [id number].   1/10/2025 10:33:15 AM CST

## 2025-01-10 NOTE — TELEPHONE ENCOUNTER
Refill Routing Note   Medication(s) are not appropriate for processing by [facility] for the following reason(s):        New or recently adjusted medication    [facility] action(s):  Defer               Appointments  past 12m or future 3m with PCP    Date Provider   Last Visit   12/19/2024 Corey Hernandez MD   Next Visit   2/18/2025 Corey Hrenandez MD   ED visits in past 90 days: 0        Note composed:4:54 PM 01/10/2025           [___] : [unfilled]

## 2025-01-14 RX ORDER — FUROSEMIDE 20 MG/1
20 TABLET ORAL
Qty: 90 TABLET | Refills: 3 | Status: SHIPPED | OUTPATIENT
Start: 2025-01-14

## 2025-02-05 DIAGNOSIS — E78.5 DYSLIPIDEMIA: ICD-10-CM

## 2025-02-05 DIAGNOSIS — E11.65 TYPE 2 DIABETES MELLITUS WITH HYPERGLYCEMIA, WITHOUT LONG-TERM CURRENT USE OF INSULIN: ICD-10-CM

## 2025-02-05 RX ORDER — PRAVASTATIN SODIUM 20 MG/1
20 TABLET ORAL
Qty: 90 TABLET | Refills: 3 | Status: SHIPPED | OUTPATIENT
Start: 2025-02-05

## 2025-02-05 NOTE — TELEPHONE ENCOUNTER
Refill Decision Note   Shelley Levy  is requesting a refill authorization.  Brief Assessment and Rationale for Refill:  Approve     Medication Therapy Plan:         Comments:     Note composed:2:40 AM 02/05/2025

## 2025-02-05 NOTE — TELEPHONE ENCOUNTER
No care due was identified.  [facility] Embedded Care Due Messages. Reference number: [id number].   2/05/2025 12:16:09 AM CST

## 2025-02-10 ENCOUNTER — TELEPHONE (OUTPATIENT)
Dept: ENDOSCOPY | Facility: HOSPITAL | Age: 65
End: 2025-02-10
Payer: COMMERCIAL

## 2025-02-10 NOTE — TELEPHONE ENCOUNTER
Contacted the patient in regards to below message. Spoke with patient. Patient states she is having issues with her leg swelling and her knees giving out on her. Patient states she wants to talk to her PCP about other colon screening options. Patient states if her PCP wants her to move forward with a colonoscopy she will call back to reschedule.

## 2025-02-14 ENCOUNTER — OFFICE VISIT (OUTPATIENT)
Dept: OPHTHALMOLOGY | Facility: CLINIC | Age: 65
End: 2025-02-14
Payer: COMMERCIAL

## 2025-02-14 DIAGNOSIS — H25.13 AGE-RELATED NUCLEAR CATARACT OF BOTH EYES: ICD-10-CM

## 2025-02-14 DIAGNOSIS — H35.033 HYPERTENSIVE RETINOPATHY OF BOTH EYES: ICD-10-CM

## 2025-02-14 DIAGNOSIS — E11.3313 MODERATE NONPROLIFERATIVE DIABETIC RETINOPATHY OF BOTH EYES WITH MACULAR EDEMA ASSOCIATED WITH TYPE 2 DIABETES MELLITUS: Primary | ICD-10-CM

## 2025-02-14 DIAGNOSIS — H43.813 VITREOUS DEGENERATION OF BOTH EYES: ICD-10-CM

## 2025-02-14 PROCEDURE — 99999 PR PBB SHADOW E&M-EST. PATIENT-LVL IV: CPT | Mod: PBBFAC,,, | Performed by: OPHTHALMOLOGY

## 2025-02-14 RX ORDER — PREDNISOLONE ACETATE 10 MG/ML
1 SUSPENSION/ DROPS OPHTHALMIC 4 TIMES DAILY
Qty: 5 ML | Refills: 0 | Status: SHIPPED | OUTPATIENT
Start: 2025-02-14 | End: 2026-02-14

## 2025-02-14 NOTE — PROGRESS NOTES
HPI    2 mo DFE/OCTm OU / Poss. Avastin OU    DLS: 12/05/2024 by Dr. Amari Tran MD    CC: pt reports: I am not seeing good at this time and vision is foggy.     ++blurred Va OS>OD  --diplopia  --eye pain   ++floaters OU/--flashes  --headaches  --curtain/shadow/veils    Eye Gtts: Thera Tears BID-QID OU       POHx:   1. Moderate nonproliferative di abetic retinopathy of both eyes with   macular edema associated with type 2 diabetes mellitus     2. Vitreous degeneration of both eyes     3. Hypertensive retinopathy of both eyes     4. Age-related nuclear cataract of both eyes     Last edited by Marianna Carrero MA on 2/14/2025  2:49 PM.             A/P    ICD-10-CM ICD-9-CM   1. 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 Vitreous degeneration of both eyes  H43.813 379.21   3. Hypertensive retinopathy of both eyes  H35.033 362.11   4. Age-related nuclear cataract of both eyes  H25.13 366.16         1. Moderate nonproliferative diabetic retinopathy of both eyes with macular edema associated with type 2 diabetes mellitus  2. Diabetic macular edema of both eyes  PCP  Corey Hernandez MD - Recent notes reviewed   12/17/2024  6.1  A1C     OD: s/p NEFTALY x3 10/31/24  VA 20/30 (was 20/40), mod DR with incr IRF today  Plan: given worse IRF, recommend Avastin OD today. Will start patient on PF to also steroid challenge, would benefit from long term steroid like Iluvien in future for less injections/treatment burden     OS: s/p NEFTALY x3 10/31/24  VA 20/500 (Was 20/125), hx of amblyopia, incr IRF today   Plan: given worse IRF, recommend Avastin OS today. Will start patient on PF to also steroid challenge, would benefit from long term steroid like Iluvien in future for less injections/treatment burden     Based on todays exam, diagnostic studies, and review of records, the determination was made for treatment  today.  Schedule Avastin Injection today Both Eye Patient chooses to proceed with injection R/B/A discussed include infection retinal detachment and stroke    Patient identified.  Timeout performed.    Risks, benefits, and alternatives to treatment were discussed in detail with the patient, including bleeding/infection (endophthalmitis)/etc.  The patient voiced understanding and wished to proceed with the procedure.  See separate consent form.    Injection Procedure Note:  Diagnosis: DME BOTH Eye    First we did the right eye, the same was performed for the left eye     Topical Proparacaine drop placed then topical 5% Betadine and then subconjunctival lidocaine 2% injection  Sterile gloves used, and sterile lid speculum placed.  5% Betadine placed at injection site again prior to injection.  Avastin 1.25mg in 0.05cc Injected inferotemporally 3.5-4mm posterior to the limbus.  Complications: None  Va at least CF at 5 feet post injection.  Retina, ONH, IOP normal after injection.    Followup as below.  Patient should return immediately PRN.  Retinal Detachment and Endophthalmitis precautions given.       Visit today is associated with current or anticipated ongoing medical care related to this patients single serious condition/complex condition (diabetic macular edema)     Recommend good blood pressure control, tight blood glucose control, and good cholesterol control     3. Vitreous degeneration of both eyes  No RT/RD, stable floaters   Plan: Observation      4. Hypertensive retinopathy of both eyes  Mild HTN changes  Plan: Observation  Recommend good blood pressure control, tight blood glucose control, and good cholesterol control     5. Age-related nuclear cataract of both eyes  VS NS  Amblyopia OS  Plan: can update Mrx      RTC 3 weeks VA/IOP OU        I saw and examined the patient and reviewed in detail the findings documented. The final examination findings, image interpretations which have been independently  interpreted, and plan as documented in the record represent my personal judgment and conclusions.    Amari Tran MD  Vitreoretinal Surgery   [facility]

## 2025-02-18 ENCOUNTER — IMMUNIZATION (OUTPATIENT)
Dept: INTERNAL MEDICINE | Facility: CLINIC | Age: 65
End: 2025-02-18
Payer: COMMERCIAL

## 2025-02-18 ENCOUNTER — LAB VISIT (OUTPATIENT)
Dept: LAB | Facility: HOSPITAL | Age: 65
End: 2025-02-18
Attending: INTERNAL MEDICINE
Payer: COMMERCIAL

## 2025-02-18 ENCOUNTER — OFFICE VISIT (OUTPATIENT)
Dept: INTERNAL MEDICINE | Facility: CLINIC | Age: 65
End: 2025-02-18
Payer: COMMERCIAL

## 2025-02-18 VITALS
SYSTOLIC BLOOD PRESSURE: 142 MMHG | OXYGEN SATURATION: 97 % | BODY MASS INDEX: 39.05 KG/M2 | RESPIRATION RATE: 19 BRPM | WEIGHT: 181 LBS | HEART RATE: 75 BPM | HEIGHT: 57 IN | DIASTOLIC BLOOD PRESSURE: 72 MMHG

## 2025-02-18 DIAGNOSIS — R53.81 PHYSICAL DECONDITIONING: ICD-10-CM

## 2025-02-18 DIAGNOSIS — E66.812 CLASS 2 SEVERE OBESITY DUE TO EXCESS CALORIES WITH SERIOUS COMORBIDITY AND BODY MASS INDEX (BMI) OF 39.0 TO 39.9 IN ADULT: ICD-10-CM

## 2025-02-18 DIAGNOSIS — E66.01 CLASS 2 SEVERE OBESITY DUE TO EXCESS CALORIES WITH SERIOUS COMORBIDITY AND BODY MASS INDEX (BMI) OF 39.0 TO 39.9 IN ADULT: ICD-10-CM

## 2025-02-18 DIAGNOSIS — L40.9 PSORIASIS: ICD-10-CM

## 2025-02-18 DIAGNOSIS — Z23 NEED FOR VACCINATION: Primary | ICD-10-CM

## 2025-02-18 DIAGNOSIS — Z00.00 ENCOUNTER FOR ANNUAL PHYSICAL EXAM: Primary | ICD-10-CM

## 2025-02-18 DIAGNOSIS — E78.5 DYSLIPIDEMIA: ICD-10-CM

## 2025-02-18 DIAGNOSIS — E11.42 TYPE 2 DIABETES MELLITUS WITH DIABETIC POLYNEUROPATHY, WITH LONG-TERM CURRENT USE OF INSULIN: ICD-10-CM

## 2025-02-18 DIAGNOSIS — Z79.4 TYPE 2 DIABETES MELLITUS WITH DIABETIC POLYNEUROPATHY, WITH LONG-TERM CURRENT USE OF INSULIN: ICD-10-CM

## 2025-02-18 DIAGNOSIS — Z91.89 SEDENTARY LIFESTYLE: ICD-10-CM

## 2025-02-18 DIAGNOSIS — R60.9 FLUID RETENTION: ICD-10-CM

## 2025-02-18 LAB
ESTIMATED AVG GLUCOSE: 131 MG/DL (ref 68–131)
HBA1C MFR BLD: 6.2 % (ref 4–5.6)

## 2025-02-18 PROCEDURE — 36415 COLL VENOUS BLD VENIPUNCTURE: CPT | Performed by: INTERNAL MEDICINE

## 2025-02-18 PROCEDURE — 99999 PR PBB SHADOW E&M-EST. PATIENT-LVL V: CPT | Mod: PBBFAC,,, | Performed by: INTERNAL MEDICINE

## 2025-02-18 PROCEDURE — 83036 HEMOGLOBIN GLYCOSYLATED A1C: CPT | Performed by: INTERNAL MEDICINE

## 2025-02-18 RX ORDER — SEMAGLUTIDE 0.68 MG/ML
0.5 INJECTION, SOLUTION SUBCUTANEOUS
Qty: 3 ML | Refills: 11 | Status: SHIPPED | OUTPATIENT
Start: 2025-02-18

## 2025-02-18 NOTE — PROGRESS NOTES
Subjective:       Patient ID: Shelley Levy is a 64 y.o. female.    Chief Complaint: Follow-up      HPI  Shelley Levy is a 64 y.o. year old female with established patient with t2DM (last A1c 6.1), severe diabetic neuropathy, diabetic retinopathy, HLD, hx of diabetic ankle wound presents for follow up. Retaining fluid, slight improvement when on lasix, discontinued after 10 days and never restarted taking. Blood pressure controlled in clinic today. Saw neurology and has been started on pregabalin 300 mg daily which is helping a little bit.     Review of Systems   Constitutional:  Negative for activity change, appetite change, fatigue, fever and unexpected weight change.   HENT:  Negative for congestion, hearing loss, postnasal drip, sneezing, sore throat, trouble swallowing and voice change.    Eyes:  Negative for pain and discharge.   Respiratory:  Negative for cough, choking, chest tightness, shortness of breath and wheezing.    Cardiovascular:  Positive for leg swelling. Negative for chest pain and palpitations.   Gastrointestinal:  Negative for abdominal distention, abdominal pain, blood in stool, constipation, diarrhea, nausea and vomiting.   Endocrine: Negative for polydipsia and polyuria.   Genitourinary:  Negative for difficulty urinating and flank pain.   Musculoskeletal:  Positive for arthralgias and back pain. Negative for joint swelling, myalgias and neck pain.   Skin:  Negative for rash.   Neurological:  Positive for numbness. Negative for dizziness, tremors, seizures, weakness and headaches.   Psychiatric/Behavioral:  Negative for agitation. The patient is not nervous/anxious.          Past Medical History:   Diagnosis Date    Type 2 diabetes mellitus with neurologic complication 9/5/2023        Prior to Admission medications    Medication Sig Start Date End Date Taking? Authorizing Provider   acetaminophen-codeine 300-30mg (TYLENOL #3) 300-30 mg Tab take 1 tablet by mouth every 4 hours  5/15/24      amitriptyline (ELAVIL) 10 MG tablet TAKE 1 TABLET BY MOUTH NIGHTLY AS NEEDED FOR INSOMNIA. 24   Yossi Prado DPM   betamethasone dipropionate (DIPROLENE) 0.05 % ointment Apply topically 2 (two) times daily. To rash on left leg 24   Ruth Moody MD   blood sugar diagnostic Strp To check BG 4 times daily, to use with insurance preferred meter 23   Corey Hernandez MD   blood-glucose meter kit To check BG 4 times daily, to use with insurance preferred meter 23  Corey Hernandez MD   calcipotriene (DOVONOX) 0.005 % cream Apply topically 2 (two) times daily. To psoriasis 23  Ruth Moody MD   chlorhexidine (PERIDEX) 0.12 % solution SMARTSI.5 Ounce(s) By Mouth Twice Daily 10/3/24   José Antonio Dee   clobetasoL (TEMOVATE) 0.05 % cream Apply topically 2 (two) times daily. Use two weeks then take a one week break. Repeat in 2 week intervals. 23   Joyce Adamson MD   clobetasoL (TEMOVATE) 0.05 % external solution Apply topically 2 (two) times daily. Use two weeks then take a one week break. Repeat in 2 week intervals. 23   Joyce Adamson MD   collagenase (SANTYL) ointment Apply topically every other day. 24      collagenase (SANTYL) ointment Apply topically daily 5/15/24      COSENTYX PEN, 2 PENS, 150 mg/mL PnIj Inject 300mg into the skin weekly x 5 weeks, then inject 300mg once monthly 6/10/24   Ruth Moody MD   cyanocobalamin (VITAMIN B-12) 1000 MCG tablet Take 1 tablet (1,000 mcg total) by mouth once daily. 23   Corey Hernandez MD   fluticasone propionate (FLONASE) 50 mcg/actuation nasal spray SPRAY 2 SPRAYS BY EACH NOSTRIL ROUTE ONCE DAILY. 24   Corey Hernandez MD   furosemide (LASIX) 20 MG tablet TAKE 1 TABLET BY MOUTH EVERY DAY 25   Corey Hernandez MD   gabapentin (NEURONTIN) 100 MG capsule Take 100 mg by mouth 3 (three) times daily. 24   Provider, Historical   gentamicin (GARAMYCIN) 0.1 % ointment Apply topically every other  "day. 4/17/24      gentamicin (GARAMYCIN) 0.1 % ointment Apply topically daily as directed 5/15/24      ibuprofen (ADVIL,MOTRIN) 800 MG tablet Take 800 mg by mouth every 8 (eight) hours as needed. 10/3/24   Provider, Historical   insulin glargine U-100, Lantus, (LANTUS SOLOSTAR U-100 INSULIN) 100 unit/mL (3 mL) InPn pen Inject 10 Units into the skin every evening. Keep same dose as levemir. 8/13/24 4/5/26  Corey Hernandez MD   ipratropium (ATROVENT) 21 mcg (0.03 %) nasal spray INSTILL 2 SPRAYS INTO EACH NOSTRIL 3 TIMES A DAY 4/4/24   Corey Hernandez MD   ketoconazole (NIZORAL) 2 % cream APPLY TOPICALLY 2 (TWO) TIMES DAILY. TO RASH UNDER BREASTS AND ON BACK 7/19/24   Ruth Moody MD   ketoconazole (NIZORAL) 2 % shampoo Apply topically once daily. Use in shower on rash, then rinse. 7/11/23   Ruth Moody MD   lancets Misc To check BG 4 times daily, to use with insurance preferred meter 9/25/23   Corey Hernandez MD   levocetirizine (XYZAL) 5 MG tablet Take 1 tablet (5 mg total) by mouth every evening. 8/16/24 8/16/25  Marianna Lira, PAJaylonC   LIDOcaine-prilocaine (EMLA) cream Apply topically as needed. 2/22/24   Yossi Prado DPM   losartan (COZAAR) 25 MG tablet TAKE 1/2 TABLET BY MOUTH ONCE DAILY 10/10/24   Corey Hernandez MD   metFORMIN (GLUCOPHAGE-XR) 500 MG ER 24hr tablet TAKE 2 TABLETS BY MOUTH TWICE A DAY WITH MEALS 10/11/24   Corey Hernandez MD   mupirocin (BACTROBAN) 2 % ointment apply topically daily as directed 5/15/24      pen needle, diabetic (BD PRECIOUS 2ND GEN PEN NEEDLE) 32 gauge x 5/32" Ndle Use to administer insulin under the skin one (1) time a day; discard pen needle after each use. 10/18/24   Corey Hernandez MD   pravastatin (PRAVACHOL) 20 MG tablet TAKE 1 TABLET BY MOUTH EVERY DAY 2/5/25   Corey Hernandez MD   prednisoLONE acetate (PRED FORTE) 1 % DrpS Place 1 drop into both eyes 4 (four) times daily. 2/14/25 2/14/26  Amari Tran MD   pregabalin (LYRICA) 100 MG capsule Take 1 capsule (100 mg total) by mouth 3 " "(three) times daily. 10/24/24 7/21/25  Adan Richardson MD   secukinumab (COSENTYX UNOREADY PEN) 300 mg/2 mL (150 mg/mL) PnIj Inject 1 pen (300mg) into the skin once every 28 days 12/30/24   Ruth Moody MD   terbinafine HCL (LAMISIL) 250 mg tablet TAKE 1 TABLET BY MOUTH EVERY DAY 11/23/23   Yossi Prado DPM   TRUEPLUS LANCETS 33 gauge Misc Apply topically 4 (four) times daily. 7/25/23   Provider, Historical        Past medical history, surgical history, and family medical history reviewed and updated as appropriate.    Medications and allergies reviewed.     Objective:          Vitals:    02/18/25 1409   BP: (!) 142/72   BP Location: Right arm   Patient Position: Sitting   Pulse: 75   Resp: 19   SpO2: 97%   Weight: 82.1 kg (181 lb 0.3 oz)   Height: 4' 9" (1.448 m)     Body mass index is 39.17 kg/m².  Physical Exam  Constitutional:       Appearance: She is well-developed.   HENT:      Head: Normocephalic and atraumatic.   Eyes:      Extraocular Movements: Extraocular movements intact.   Cardiovascular:      Rate and Rhythm: Normal rate and regular rhythm.      Heart sounds: Normal heart sounds.   Pulmonary:      Effort: Pulmonary effort is normal. No respiratory distress.      Breath sounds: Normal breath sounds. No wheezing.   Abdominal:      General: Bowel sounds are normal. There is no distension.      Palpations: Abdomen is soft.      Tenderness: There is no abdominal tenderness.   Musculoskeletal:         General: No tenderness. Normal range of motion.      Cervical back: Normal range of motion.      Right lower leg: Edema present.      Left lower leg: Edema present.   Skin:     General: Skin is warm and dry.   Neurological:      Mental Status: She is alert and oriented to person, place, and time.      Cranial Nerves: No cranial nerve deficit.      Deep Tendon Reflexes: Reflexes are normal and symmetric.         Lab Results   Component Value Date    WBC 7.99 12/17/2024    HGB 11.0 (L) 12/17/2024    " HCT 35.9 (L) 12/17/2024     12/17/2024    CHOL 160 12/17/2024    TRIG 156 (H) 12/17/2024    HDL 37 (L) 12/17/2024    ALT 21 12/17/2024    AST 15 12/17/2024     12/17/2024    K 4.7 12/17/2024     12/17/2024    CREATININE 0.8 12/17/2024    BUN 17 12/17/2024    CO2 24 12/17/2024    TSH 2.111 12/17/2024    HGBA1C 6.1 (H) 12/17/2024       Assessment:       1. Encounter for annual physical exam    2. Type 2 diabetes mellitus with diabetic polyneuropathy, with long-term current use of insulin    3. Dyslipidemia    4. Psoriasis    5. Fluid retention    6. Physical deconditioning    7. Sedentary lifestyle          Plan:     Shelley was seen today for follow-up.    Diagnoses and all orders for this visit:    Encounter for annual physical exam    Type 2 diabetes mellitus with diabetic polyneuropathy, with long-term current use of insulin  Comments:  last a1c 6.1, controlled. on metformin 1000 bid, lantus 10; on lyrica 100 TID and gabapentin 100  Orders:  -     Hemoglobin A1C; Future  -     semaglutide (OZEMPIC) 0.25 mg or 0.5 mg (2 mg/3 mL) pen injector; Inject 0.25 mg into the skin once weekly for 4 weeks, then increase to 0.5 mg once weekly thereafter    Dyslipidemia  Comments:  on pravastatin 20, recheck lipid panel    Psoriasis  Comments:  on cosentyx, follows with derm, no changes to current management    Fluid retention  Comments:  weight gain, restart lasix 20 mg daily, monitor renal function    Physical deconditioning  -     Ambulatory Referral/Consult to Physical/Occupational Therapy; Future    Sedentary lifestyle  Comments:  encouraged increasing activity as tolerated  Orders:  -     Ambulatory Referral/Consult to Physical/Occupational Therapy; Future    Class 2 severe obesity due to excess calories with serious comorbidity and body mass index (BMI) of 39.0 to 39.9 in adult  Comments:  start ozempic 0.25 mg weekly, titrate to 0.5        Health maintenance reviewed with patient.     Follow up in about  3 months (around 5/18/2025).    Corey Hernandez MD  Internal Medicine / Primary Care  [facility]  2/18/2025

## 2025-02-18 NOTE — PATIENT INSTRUCTIONS
Flu and covid-19 vaccinations today  Take lasix 20 mg daily  Schedule orthopedics appointment for evaluation of both knees.     Take your old compression stockings back to [facility] on [address] and see if they are able to change them out for a petite size. If they are not able to, please ask them if you need a new order or something else. Let my office know if you need a new order.     Start ozempic 0.25 mg weekly for four weeks, then increase to 0.5 mg weekly.     Return to clinic in 3 months or sooner if needed.

## 2025-02-20 ENCOUNTER — CLINICAL SUPPORT (OUTPATIENT)
Dept: AUDIOLOGY | Facility: CLINIC | Age: 65
End: 2025-02-20
Payer: COMMERCIAL

## 2025-02-20 ENCOUNTER — OFFICE VISIT (OUTPATIENT)
Dept: OTOLARYNGOLOGY | Facility: CLINIC | Age: 65
End: 2025-02-20
Payer: COMMERCIAL

## 2025-02-20 DIAGNOSIS — H93.13 TINNITUS, BILATERAL: ICD-10-CM

## 2025-02-20 DIAGNOSIS — H91.93 BILATERAL HEARING LOSS, UNSPECIFIED HEARING LOSS TYPE: ICD-10-CM

## 2025-02-20 DIAGNOSIS — H91.92 HEARING LOSS OF LEFT EAR, UNSPECIFIED HEARING LOSS TYPE: ICD-10-CM

## 2025-02-20 DIAGNOSIS — H90.3 SENSORINEURAL HEARING LOSS, BILATERAL: Primary | ICD-10-CM

## 2025-02-20 PROBLEM — E66.812 CLASS 2 SEVERE OBESITY DUE TO EXCESS CALORIES WITH SERIOUS COMORBIDITY AND BODY MASS INDEX (BMI) OF 36.0 TO 36.9 IN ADULT: Status: ACTIVE | Noted: 2025-02-20

## 2025-02-20 PROBLEM — E66.01 CLASS 2 SEVERE OBESITY DUE TO EXCESS CALORIES WITH SERIOUS COMORBIDITY AND BODY MASS INDEX (BMI) OF 36.0 TO 36.9 IN ADULT: Status: ACTIVE | Noted: 2025-02-20

## 2025-02-20 PROBLEM — L97.312 ULCER OF RIGHT ANKLE, WITH FAT LAYER EXPOSED: Status: RESOLVED | Noted: 2024-04-18 | Resolved: 2025-02-20

## 2025-02-20 PROCEDURE — 99999 PR PBB SHADOW E&M-EST. PATIENT-LVL IV: CPT | Mod: PBBFAC,,, | Performed by: OTOLARYNGOLOGY

## 2025-02-20 NOTE — PROGRESS NOTES
Shelley Levy, a 64 y.o. female, was seen today in the clinic for an audiologic evaluation.  The patient's main complaint was hearing loss and aural fullness.  Mrs. Levy reported chronic aural fullness that is worse in her left ear.  Onset 8 months ago.  Pt feels she has bilateral hearing loss.  Pt reported intermittent tinnitus. Pt denied vertigo.    Tympanometry revealed Type A in the right ear and Type B in the left ear. Audiogram results revealed mild to moderate sensorineural hearing loss (SNHL) in the right ear and mild sloping to severe SNHL in the left ear.  Speech reception thresholds were noted at 35 dB in the right ear and 40 dB in the left ear.  Speech discrimination scores were 96% in the right ear and 96% in the left ear.    Recommendations:  Otologic evaluation  Annual audiogram  Hearing protection when in noise  Hearing aid consultation with medical clearance

## 2025-02-24 NOTE — PROGRESS NOTES
Ear, Nose, & Throat  Otolaryngology - Head & Neck Surgery    Summary of Visit:  Shelley Levy is a kind patient who was referred to me by Dr. Corey Hernandez in consultation for hearing loss      Subjective:     Chief Complaint: No chief complaint on file.      Shelley Levy is a 64 y.o. female who was referred to me by Dr. Corey Hernandez in consultation for hearing loss.  She reports a several year history of progressive difficulty with communication.  Hearing loss is noted bilaterally.  She denies any vertigo, otalgia, otorrhea.  She does note some occasional tinnitus.  She has no history of noise exposure, ototoxic exposure, family history of hearing loss.    Past Medical History  Active Ambulatory Problems     Diagnosis Date Noted    Type 2 diabetes mellitus with neurologic complication 09/05/2023    Psoriasis 09/05/2023    Moderate nonproliferative diabetic retinopathy of both eyes with macular edema associated with type 2 diabetes mellitus 03/08/2024    Diabetic macular edema of both eyes 03/08/2024    Vitreous degeneration of both eyes 03/08/2024    Hypertensive retinopathy of both eyes 03/08/2024    Age-related nuclear cataract of both eyes 03/08/2024    Wound of right leg 05/09/2024    Class 2 severe obesity due to excess calories with serious comorbidity and body mass index (BMI) of 36.0 to 36.9 in adult 02/20/2025     Resolved Ambulatory Problems     Diagnosis Date Noted    Ulcer of right ankle, with fat layer exposed 04/18/2024     No Additional Past Medical History       Past Surgical History  She has a past surgical history that includes Breast lumpectomy and Breast biopsy.    Past Surgical History:   Procedure Laterality Date    BREAST BIOPSY      BREAST LUMPECTOMY          Family History  Her family history includes Breast cancer in her mother; Macular degeneration in her maternal grandmother; No Known Problems in her brother, father, maternal aunt, maternal grandfather, maternal uncle, paternal  aunt, paternal grandfather, paternal grandmother, paternal uncle, and sister; Ovarian cancer in her mother.    Social History  She reports that she has never smoked. She has never used smokeless tobacco. She reports that she does not drink alcohol and does not use drugs.    Allergies  She has no known allergies.    Medications  She has a current medication list which includes the following prescription(s): acetaminophen-codeine 300-30mg, amitriptyline, betamethasone dipropionate, blood sugar diagnostic, blood-glucose meter, calcipotriene, chlorhexidine, clobetasol, clobetasol, collagenase, collagenase, cosentyx pen (2 pens), cyanocobalamin, fluticasone propionate, furosemide, gabapentin, gentamicin, gentamicin, ibuprofen, lantus solostar u-100 insulin, ipratropium, ketoconazole, ketoconazole, lancets, levocetirizine, lidocaine-prilocaine, losartan, metformin, mupirocin, pen needle, diabetic, pravastatin, prednisolone acetate, pregabalin, cosentyx unoready pen, ozempic, terbinafine hcl, and trueplus lancets.    ROS:  Pertinent positive and negative review of systems as noted in HPI.     Objective:     There were no vitals taken for this visit.     Data Review:     AUDIO        Procedures:       Assessment:     1. Hearing loss of left ear, unspecified hearing loss type        Plan:     I had a long discussion with the patient regarding her condition and the further workup and management options.  She has a relatively flat moderate to moderately severe sensorineural hearing loss bilaterally with well-preserved speech discrimination.  She would benefit from hearing aids at this point.  She was provided contact information for the hearing aid Center.  Recommend repeat audiogram in 1 year.    No orders of the defined types were placed in this encounter.         Problem List Items Addressed This Visit    None  Visit Diagnoses         Hearing loss of left ear, unspecified hearing loss type

## 2025-03-07 ENCOUNTER — OFFICE VISIT (OUTPATIENT)
Dept: OPHTHALMOLOGY | Facility: CLINIC | Age: 65
End: 2025-03-07
Payer: COMMERCIAL

## 2025-03-07 DIAGNOSIS — E11.3313 MODERATE NONPROLIFERATIVE DIABETIC RETINOPATHY OF BOTH EYES WITH MACULAR EDEMA ASSOCIATED WITH TYPE 2 DIABETES MELLITUS: Primary | ICD-10-CM

## 2025-03-07 DIAGNOSIS — H43.813 VITREOUS DEGENERATION OF BOTH EYES: ICD-10-CM

## 2025-03-07 PROCEDURE — 99999 PR PBB SHADOW E&M-EST. PATIENT-LVL III: CPT | Mod: PBBFAC,,, | Performed by: OPHTHALMOLOGY

## 2025-03-07 NOTE — PROGRESS NOTES
HPI    3 week F/u     Pt states her vision is blurry. She has optom appt in April.     -Flashes   -Floaters   -Pain       Last edited by Carol Arreola on 3/7/2025  1:20 PM.         A/P    ICD-10-CM ICD-9-CM   1. 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 Vitreous degeneration of both eyes  H43.813 379.21         1. Moderate nonproliferative diabetic retinopathy of both eyes with macular edema associated with type 2 diabetes mellitus  2. Diabetic macular edema of both eyes  PCP  Corey Hernandez MD - Recent notes reviewed   02/18/2025  6.2  A1C     OD: s/p NEFTALY x4 2/14/25  [facility] 20/40 (was 20/30), mod DR with IRF still, IOP 20 on steroid challenge    Plan: recommend to get auth for Iluvien for better and longer DME control with fewer injections/costs to healthcare system as pt not responding to repeat Avastin injections     OS: s/p NEFTALY x4 2/14/25  VA 20/150 (Was 20/500), hx of amblyopia, incr IRF , IOP good  Plan: recommend to get auth for Iluvien for better and longer DME control with fewer injections/costs to healthcare system as pt not responding to repeat Avastin injections       Visit today is associated with current or anticipated ongoing medical care related to this patients single serious condition/complex condition (diabetic macular edema)     Recommend good blood pressure control, tight blood glucose control, and good cholesterol control     3. Vitreous degeneration of both eyes  No RT/RD, stable floaters   Plan: Observation      4. Hypertensive retinopathy of both eyes  Mild HTN changes  Plan: Observation  Recommend good blood pressure control, tight blood glucose control, and good cholesterol control     5. Age-related nuclear cataract of both eyes  VS NS  Amblyopia OS  Plan: can update Mrx      RTC 4 weeks DFE/OCTm OU, get auth Iluvien OU      I saw and examined the patient and reviewed  in detail the findings documented. The final examination findings, image interpretations which have been independently interpreted, and plan as documented in the record represent my personal judgment and conclusions.    Amari Tran MD  Vitreoretinal Surgery   [facility]

## 2025-03-10 ENCOUNTER — CLINICAL SUPPORT (OUTPATIENT)
Dept: REHABILITATION | Facility: HOSPITAL | Age: 65
End: 2025-03-10
Payer: COMMERCIAL

## 2025-03-10 DIAGNOSIS — R53.1 DECREASED STRENGTH, ENDURANCE, AND MOBILITY: Primary | ICD-10-CM

## 2025-03-10 DIAGNOSIS — M25.662 DECREASED RANGE OF MOTION (ROM) OF BOTH KNEES: ICD-10-CM

## 2025-03-10 DIAGNOSIS — M25.661 DECREASED RANGE OF MOTION (ROM) OF BOTH KNEES: ICD-10-CM

## 2025-03-10 DIAGNOSIS — R68.89 DECREASED STRENGTH, ENDURANCE, AND MOBILITY: Primary | ICD-10-CM

## 2025-03-10 DIAGNOSIS — Z91.89 SEDENTARY LIFESTYLE: ICD-10-CM

## 2025-03-10 DIAGNOSIS — R53.81 PHYSICAL DECONDITIONING: ICD-10-CM

## 2025-03-10 DIAGNOSIS — Z74.09 DECREASED STRENGTH, ENDURANCE, AND MOBILITY: Primary | ICD-10-CM

## 2025-03-10 PROCEDURE — 97163 PT EVAL HIGH COMPLEX 45 MIN: CPT | Mod: PN

## 2025-03-10 PROCEDURE — 97110 THERAPEUTIC EXERCISES: CPT | Mod: PN

## 2025-03-10 NOTE — PROGRESS NOTES
Outpatient Rehab    Physical Therapy Evaluation    Patient Name: Shelley Levy  MRN: [id number]  YOB: 1960  Encounter Date: 3/10/2025    Therapy Diagnosis:   Encounter Diagnoses   Name Primary?    Physical deconditioning     Sedentary lifestyle     Decreased strength, endurance, and mobility Yes    Decreased range of motion (ROM) of both knees      Physician: Corey Hernandez MD    Physician Orders: Eval and Treat  Medical Diagnosis: R53.81 (ICD-10-CM) - Physical deconditioning  Z91.89 (ICD-10-CM) - Sedentary lifestyle.       Visit # / Visits Authorized:  1 / 1   Date of Evaluation:  3/10/2025   Insurance Authorization Period: 2/18/25 to 2/18/26  Plan of Care Certification:  3/10/2025 to 5/9/25      Time In: 1425   Time Out: 1545  Total Time: 80   Total Billable Time: 80    Intake Outcome Measure for FOTO Survey    Therapist reviewed FOTO scores for Shelley Levy on 3/10/2025.   FOTO report - see Media section or FOTO account episode details.     Intake Score: 45%         Subjective   History of Present Illness  Shelley is a 64 y.o. female who reports to physical therapy with a chief concern of B knee pain, B LE pain.     The patient reports a medical diagnosis of R53.81 (ICD-10-CM) - Physical deconditioning  Z91.89 (ICD-10-CM) - Sedentary lifestyle.            History of Present Condition/Illness: She has type 2 DM with neuropathy, edema, bilateral knee osteoarthritis, venous insufficiency and other conditions. Decreased walking tolerance due to tightness and burning in legs. She gets hot pain, and tingling that radiates up legs. With standing, gets radiating pain down lower extremity like her calf will explode with tightness, burning, numbness. She reports clammy sensation when this occurs sometimes. She also gets numbness, burning and redness in hands in fingers. She has poor vision, follows with optometry/opthalmology. She has used a walker for the last 5 months- uses pediatric walker due to  petite stature. Unable to get a rollator due to height. She has had 2 falls. She tripped over rug in a restaurant when the back wheels caught the rug 2-3 months ago. She also reports a fall in yard when she turned around without walker and tumbled 7-8 months ago. She did not seek medical attention in either fall but was sore.  is handicap and she needs to assist with dressing and toileting. She gets neck pain when doing this. Had neck imaging without significant findings. She takes diabetes medication and monitors diet. States she is back on track again with diet after falling off a while. Walking tolerance limited to about 50 ft with rolling walker. She was fitted for but had issues getting diabetic shoes due to husbands hospitalization. She tried compression socks but they are too long and was not able to get an appropriate fit.     Activities of Daily Living  Social history was obtained from Patient.    General Prior Level of Function Comments: chronic pain in knees  General Current Level of Function Comments: pain with ambulation, ADLs, requires assist  Patient Roles: Caregiver for adult  Patient Responsibilities: Health management, Community mobility, Driving, Home management, Meal prep, Personal ADL, Shopping    Previously independent with activities of daily living? Yes     Currently independent with activities of daily living? No  Activities currently needing assistance include Bathing.        Previously independent with instrumental activities of daily living? Yes     Currently independent with instrumental activities of daily living? No  Activities currently needing assistance include: Grocery/shopping, Meal prep, and Home establishment and management.            Pain     Patient reports a current pain level of 6/10. Pain at best is reported as 4/10. Pain at worst is reported as 9/10.   Location: B knees, LEs  Clinical Progression (since onset): Worsening  Pain Qualities: Sharp, Tightness,  Squeezing, Radiating, Cramping, Burning  Pain-Relieving Factors: Sitting, Rest, Medications - prescription  Pain-Aggravating Factors: Walking, Standing, Squatting         Review of Systems  Patient reports: Diabetes, Osteoarthritis, Changes in Nailbeds, and Pitting Edema  Patient denies: Bladder Incontinence, Bowel Incontinence, Saddle Numbness, and Open Wound        Treatment History  Treatments  Previously Received Treatments: No  Currently Receiving Treatments: No    Living Arrangements  Living Situation  Housing: Home independently  Living Arrangements: Spouse/significant other  Support Systems: Spouse/significant other    Home Setup  Type of Structure: House  Home Access: Stairs without rails  Entrance Stairs - Number of Steps: 1  Number of Levels in Home: One level  Bathroom Equipment: Grab bars in shower    Patient cares for near dependent .       Employment  Patient does not report that: Does the patient's condition impact their ability to work?  Employment Status: Not employed          Past Medical History/Physical Systems Review:   Shelley Levy  has a past medical history of Type 2 diabetes mellitus with neurologic complication.    Shelley Levy  has a past surgical history that includes Breast lumpectomy and Breast biopsy.    Shelley has a current medication list which includes the following prescription(s): acetaminophen-codeine 300-30mg, amitriptyline, betamethasone dipropionate, blood sugar diagnostic, blood-glucose meter, calcipotriene, chlorhexidine, clobetasol, clobetasol, collagenase, collagenase, cosentyx pen (2 pens), cyanocobalamin, fluticasone propionate, furosemide, gabapentin, gentamicin, gentamicin, ibuprofen, lantus solostar u-100 insulin, ipratropium, ketoconazole, ketoconazole, lancets, levocetirizine, lidocaine-prilocaine, losartan, metformin, mupirocin, pen needle, diabetic, pravastatin, prednisolone acetate, pregabalin, cosentyx unoready pen, ozempic, terbinafine hcl,  and trueplus lancets, and the following Facility-Administered Medications: fluocinolone and fluocinolone.    Review of patient's allergies indicates:  No Known Allergies     Objective   Posture                 Bilaterally, knee position is: Flexed and Genu Varum  Petite stature, high bmi     Knee Observations  Right Knee Observations  Present: Edema and Straight Leg Raise Extensor Lag  Left Knee Observations  Present: Edema and Straight Leg Raise Extensor Lag            Lower Extremity Sensation  General Lumbar/Lower Extremity Sensation  Impaired: Right and Left  Right Lumbar/Lower Extremity Sensation  Hypersensitive: Light Touch  Right Lumbar/Lower Extremity Sensation Stocking Glove Pattern: Yes    Left Lumbar/Lower Extremity Sensation  Intact: Light Touch  Left Lumbar/Lower Extremity Sensation Stocking Glove Pattern: Yes              Knee Swelling  Location of Measurement Right  (cm) Left  (cm)   20 cm Above Joint Line       10 cm Vastus Medialis Oblique       At Joint Line       15 cm Below Joint Line       +1 pitting edema        Knee Palpation  Right Knee Palpation  Abnormal: Bony Prominence/Bursa  Right Knee Bony Prominence/Bursa Palpation Observations: tenderness medial femoral condyle, bony enlargement       Left Knee Palpation  Abnormal: Bony Prominence/Bursa  Left Knee Bony Prominence/Bursa Palpation Observations: tenderness medial femoral condyle, bony enlargement            Hip Range of Motion   Right Hip   Active (deg) Passive (deg) Pain   Flexion 90   Yes   Extension         ABduction         ADduction         External Rotation 90/90         External Rotation Prone         Internal Rotation 90/90         Internal Rotation Prone             Left Hip   Active (deg) Passive (deg) Pain   Flexion 90   Yes   Extension         ABduction         ADduction         External Rotation 90/90         External Rotation Prone         Internal Rotation 90/90         Internal Rotation Prone                  Knee Range  of Motion   Right Knee   Active (deg) Passive (deg) Pain   Flexion 110   Yes   Extension 9   Yes       Left Knee   Active (deg) Passive (deg) Pain   Flexion 90 92 Yes   Extension 10   Yes                      Hip Strength - Planes of Motion   Right Strength Right Pain Left Strength Left  Pain   Flexion (L2) 4+ Yes 4+ Yes   Extension           ABduction           ADduction           Internal Rotation 5 Yes 4 Yes   External Rotation 4+ Yes (knee pain) 4 Yes (knee pain)       Knee Strength   Right Strength Right Pain Left Strength Left  Pain   Flexion (S2) 4   4- Yes   Prone Flexion           Extension (L3) 4+   4 Yes     Knee Extensor Lag  Lag Present: Right and Left       Ankle/Foot Strength - Planes of Motion   Right Strength Right Pain Left Strength Left  Pain   Dorsiflexion (L4) 4-   4-     Plantar Flexion (S1) 5   4     Inversion 4   4     Eversion 4-   4-     Great Toe Flexion           Great Toe Extension (L5)           Lesser Toes Flexion           Lesser Toes Extension                       Cervical/Thoracic Special Tests  Thoracic Tests  Positive: Slump         Lumbar/Pelvic Girdle Special Tests       Lumbar Tests - SLR and Tension  Positive: Right Passive Straight Leg Raise and Left Passive Straight Leg Raise                       Knee Patellar Screening       Right Patellar Mobility  Hypomobile: Medial, Lateral, Superior, and Inferior  Left Patellar Mobility  Hypomobile: Medial, Lateral, Superior, and Inferior  Right Patellar Position Tests  Positive: Right Patella-Femoral Grind  Left Patellar Position Tests  Positive: Left Patella-Femoral Grind              Fall Risk  Functional mobility test results suggest the patient is: At Risk for Falls  Timed Up & Go (TUG)  Time: 26 seconds  Observations: Short strides and Slow tentative pace  An older adult who takes >=12 seconds to complete the TUG is at risk for falling.       Sit to Stand Testing  The patient completed 5 sit to stand transfers in 21.73 sec. use  of RW, UE support             Ambulation Details  Ambulation distance was 171 meters.      Gait Analysis  Gait Pattern: Antalgic  Walking Speed: Decreased    Right Side Walking Observations  Decreased: Step Length       Left Side Walking Observations  Increased: Stance Time  Decreased: Step Length     Trunk Observations During Gait: Left lateral lean over stance limb    Knee Observations During Gait  Bilateral: Decreased Knee Extension in Initial Contact, Knee Decreased Extension in Midstance, and Knee Varus Thrust         Treatment:  Therapeutic Exercise  Therapeutic Exercise Activity 1: seated heel slides 10x  Therapeutic Exercise Activity 2: seated marches 10x  Therapeutic Exercise Activity 3: seated hip abduction 10x  Therapeutic Exercise Activity 4: ankle pumps elevated 10x  Therapeutic Exercise Activity 5: walking intervals 1'    Assessment & Plan   Assessment  Shelley presents with a condition of High complexity.   Presentation of Symptoms: Evolving  Will Comorbidities Impact Care: Yes  See pmHx     Functional Limitations: Activity tolerance, Ambulating on uneven surfaces, Pain with ADLs/IADLs, Painful locomotion/ambulation, Participating in leisure activities, Performing household chores, Range of motion, Standing tolerance, Squatting, Increased risk of fall, Maintaining balance, Gait limitations, Functional mobility, Decreased ambulation distance/endurance, Completing self-care activities  Impairments: Impaired balance, Activity intolerance, Abnormal or restricted range of motion, Impaired physical strength, Lack of appropriate home exercise program, Pain with functional activity, Abnormal gait  Personal Factors Affecting Prognosis: Pain, Physical limitations    Patient Goal for Therapy (PT): improve walking tolerance.  Prognosis: Fair  Assessment Details: Patient is a 64 y.o. F with a medical diagnosis of R53.81 (ICD-10-CM) - Physical deconditioning Z91.89 (ICD-10-CM) - Sedentary lifestyle. Patient presents  with primary complaints of bilateral knee pain and bilateral lower extremity burning sensation with walking. Lower extremity burning/tingling/radiating pain appears multifactorial due to co-morbidities including chronic low back pain, diabetes with diabetic peripheral neuropathy, and reported vascular issues.  Patient demonstrates decreased lower extremity range of motion, decreased lower extremity strength, impaired balance and gait, and decreased functional mobility. Onset of lower extremity pain occurred after about 1 min of ambulation, however was able to complete 171ft during 2MWT. Symptoms relieved with seated rest break. Patient demonstrates impaired knee mobility which contributes to gait impairments as well. Patient with pain and difficulty with performance of ADLs involving self care as well as assisting with care of more dependent  and demonstrates limited community ambulation tolerance. She will benefit from skilled PT to address these impairments.     Plan  From a physical therapy perspective, the patient would benefit from: Skilled Rehab Services    Planned therapy interventions include: Therapeutic exercise, Therapeutic activities, Neuromuscular re-education, Manual therapy, and Gait training.    Planned modalities to include: Electrical stimulation - attended, Cryotherapy (cold pack), Electrical stimulation - passive/unattended, and Thermotherapy (hot pack).        Visit Frequency: 2 times Per Week for 8 Weeks.       This plan was discussed with Patient and Therapy assistant.   Discussion participants: Agreed Upon Plan of Care             Patient's spiritual, cultural, and educational needs considered and patient agreeable to plan of care and goals.           Goals:   Active       long term goals       patient will improve FOTO score to 55% to improve functional mobility       Start:  03/11/25    Expected End:  05/09/25            patient will improve knee flexion to 105+ to improve  performance of ADLs        Start:  03/11/25    Expected End:  05/09/25            patient will improve lower extremity manual muscle tests by 1/2 grade to improve strength       Start:  03/11/25    Expected End:  05/09/25               short term goals       patient will demonstrate independence with home exercise program        Start:  03/11/25    Expected End:  04/11/25            patient will improve knee range of motion by 5+ degrees to improve functional mobility       Start:  03/11/25    Expected End:  04/11/25            patient will be able to ambulate 3 laps with rolling walker without rest break       Start:  03/11/25    Expected End:  04/11/25                Danna Godfrey, PT, DPT

## 2025-03-17 DIAGNOSIS — J06.9 UPPER RESPIRATORY TRACT INFECTION, UNSPECIFIED TYPE: Primary | ICD-10-CM

## 2025-03-17 RX ORDER — AMOXICILLIN AND CLAVULANATE POTASSIUM 875; 125 MG/1; MG/1
1 TABLET, FILM COATED ORAL EVERY 12 HOURS
Qty: 14 TABLET | Refills: 0 | Status: SHIPPED | OUTPATIENT
Start: 2025-03-17

## 2025-03-24 ENCOUNTER — CLINICAL SUPPORT (OUTPATIENT)
Dept: REHABILITATION | Facility: HOSPITAL | Age: 65
End: 2025-03-24
Payer: COMMERCIAL

## 2025-03-24 DIAGNOSIS — R53.1 DECREASED STRENGTH, ENDURANCE, AND MOBILITY: Primary | ICD-10-CM

## 2025-03-24 DIAGNOSIS — M25.662 DECREASED RANGE OF MOTION (ROM) OF BOTH KNEES: ICD-10-CM

## 2025-03-24 DIAGNOSIS — Z74.09 DECREASED STRENGTH, ENDURANCE, AND MOBILITY: Primary | ICD-10-CM

## 2025-03-24 DIAGNOSIS — R68.89 DECREASED STRENGTH, ENDURANCE, AND MOBILITY: Primary | ICD-10-CM

## 2025-03-24 DIAGNOSIS — M25.661 DECREASED RANGE OF MOTION (ROM) OF BOTH KNEES: ICD-10-CM

## 2025-03-24 PROCEDURE — 97110 THERAPEUTIC EXERCISES: CPT | Mod: PN,CQ

## 2025-03-24 PROCEDURE — 97530 THERAPEUTIC ACTIVITIES: CPT | Mod: PN,CQ

## 2025-03-24 PROCEDURE — 97112 NEUROMUSCULAR REEDUCATION: CPT | Mod: PN,CQ

## 2025-03-24 NOTE — PROGRESS NOTES
Outpatient Rehab    Physical Therapy Visit    Patient Name: Shelley Levy  MRN: [id number]  YOB: 1960  Encounter Date: 3/24/2025    Therapy Diagnosis:   Encounter Diagnoses   Name Primary?    Decreased strength, endurance, and mobility Yes    Decreased range of motion (ROM) of both knees      Physician: Corey Hernandez MD    Physician Orders: Eval and Treat  Medical Diagnosis: Physical deconditioning  Sedentary lifestyle    Visit # / Visits Authorized:  1 / 20  Insurance Authorization Period: 3/10/2025 to 12/31/2025  Date of Evaluation: 3/10/2025   Plan of Care Certification: 3/10/2025 to 5/9/25      PT/PTA: PTA   Number of PTA visits since last PT visit:1  Time In: 1500   Time Out: 1555  Total Time: 55   Total Billable Time:      FOTO:  Intake Score:  %  Survey Score 1:  %  Survey Score 2:  %         Subjective   Some increased knee pain with HEP.  Pain reported as 6/10. Left knee/physical deconditioning    Objective            Treatment:  Therapeutic Exercise  TE 1: seated heel slides 10x2  TE 2: Seated heel/toe raises 2 x 10  Balance/Neuromuscular Re-Education  NMR 1: seated marches 10x2  NMR 2: seated hip abduction 10x2 RTB  NMR 3: Seated hip adduction 2x10  NMR 4: Seated LAQ 2 x 10  Therapeutic Activity  TA 1: walking intervals 1' x 3 trials  TA 2: Sit to stand 2 x 5    Time Entry(in minutes):  Neuromuscular Re-Education Time Entry: 30  Therapeutic Activity Time Entry: 15  Therapeutic Exercise Time Entry: 10    Assessment & Plan   Assessment: Patient is a 64 y.o. F with a medical diagnosis of R53.81 (ICD-10-CM) - Physical deconditioning Z91.89 (ICD-10-CM) - Sedentary lifestyle. Patient presents with primary complaints of bilateral knee pain and bilateral lower extremity burning sensation with walking. Presents for first follow up appointmnet. Reports mild knee soreness from HEP. Discussed modifications to HEP and advised to keep in tolerable range. Tolerated session fairly well although walking  "loops limited by fatigue and mild LE "burning". Able to resolve with brief sitting rest breals. Mild fatigue post session. Will monitor response.       Patient will continue to benefit from skilled outpatient physical therapy to address the deficits listed in the problem list box on initial evaluation, provide pt/family education and to maximize pt's level of independence in the home and community environment.     Patient's spiritual, cultural, and educational needs considered and patient agreeable to plan of care and goals.           Plan: Cont treatment per POC    Goals:   Active       long term goals       patient will improve FOTO score to 55% to improve functional mobility (Progressing)       Start:  03/11/25    Expected End:  05/09/25            patient will improve knee flexion to 105+ to improve performance of ADLs  (Progressing)       Start:  03/11/25    Expected End:  05/09/25            patient will improve lower extremity manual muscle tests by 1/2 grade to improve strength (Progressing)       Start:  03/11/25    Expected End:  05/09/25               short term goals       patient will demonstrate independence with home exercise program  (Progressing)       Start:  03/11/25    Expected End:  04/11/25            patient will improve knee range of motion by 5+ degrees to improve functional mobility (Progressing)       Start:  03/11/25    Expected End:  04/11/25            patient will be able to ambulate 3 laps with rolling walker without rest break (Progressing)       Start:  03/11/25    Expected End:  04/11/25                Radah Bond, PTA    "

## 2025-03-25 ENCOUNTER — DOCUMENTATION ONLY (OUTPATIENT)
Dept: REHABILITATION | Facility: HOSPITAL | Age: 65
End: 2025-03-25
Payer: COMMERCIAL

## 2025-03-27 ENCOUNTER — CLINICAL SUPPORT (OUTPATIENT)
Dept: REHABILITATION | Facility: HOSPITAL | Age: 65
End: 2025-03-27
Payer: COMMERCIAL

## 2025-03-27 DIAGNOSIS — Z74.09 DECREASED STRENGTH, ENDURANCE, AND MOBILITY: Primary | ICD-10-CM

## 2025-03-27 DIAGNOSIS — M25.662 DECREASED RANGE OF MOTION (ROM) OF BOTH KNEES: ICD-10-CM

## 2025-03-27 DIAGNOSIS — R53.1 DECREASED STRENGTH, ENDURANCE, AND MOBILITY: Primary | ICD-10-CM

## 2025-03-27 DIAGNOSIS — R68.89 DECREASED STRENGTH, ENDURANCE, AND MOBILITY: Primary | ICD-10-CM

## 2025-03-27 DIAGNOSIS — M25.661 DECREASED RANGE OF MOTION (ROM) OF BOTH KNEES: ICD-10-CM

## 2025-03-27 PROCEDURE — 97110 THERAPEUTIC EXERCISES: CPT | Mod: PN

## 2025-03-27 PROCEDURE — 97112 NEUROMUSCULAR REEDUCATION: CPT | Mod: PN

## 2025-03-27 NOTE — PROGRESS NOTES
"  Outpatient Rehab    Physical Therapy Visit    Patient Name: Shelley Levy  MRN: [id number]  YOB: 1960  Encounter Date: 3/27/2025    Therapy Diagnosis:   Encounter Diagnoses   Name Primary?    Decreased strength, endurance, and mobility Yes    Decreased range of motion (ROM) of both knees      Physician: Corey Hernandez MD    Physician Orders: Eval and Treat  Medical Diagnosis: Physical deconditioning  Sedentary lifestyle    Visit # / Visits Authorized:  2 / 20  Insurance Authorization Period: 3/10/2025 to 12/31/2025  Date of Evaluation: 3/10/2025   Plan of Care Certification: 3/10/2025 to 5/9/25      PT/PTA: PT   Number of PTA visits since last PT visit:1  Time In: 1500   Time Out: 1555  Total Time: 55   Total Billable Time: 55    FOTO:  Intake Score:  %  Survey Score 1:  %  Survey Score 2:  %         Subjective   moderate knee pain today.  Pain reported as 6/10. Left knee/physical deconditioning    Objective       Knee Range of Motion   Right Knee   Active (deg) Passive (deg) Pain   Flexion         Extension 5           Left Knee   Active (deg) Passive (deg) Pain   Flexion         Extension 5                       Treatment:  Therapeutic Exercise  TE 1: seated heel slides 10x2  TE 2: Seated heel/toe raises 2 x 10  TE 3: walking intervals [id number]  TE 4: seated hamstring stretch 3x30" B  TE 5: nustep L1 5'  Balance/Neuromuscular Re-Education  NMR 1: seated marches 10x2  NMR 2: seated hip abduction 10x2 RTB  NMR 3: Seated hip adduction 2x10  NMR 4: Seated LAQ 2 x 10    Time Entry(in minutes):  Neuromuscular Re-Education Time Entry: 30  Therapeutic Exercise Time Entry: 25    Assessment & Plan   Assessment: Patient is a 64 y.o. F with a medical diagnosis of R53.81 (ICD-10-CM) - Physical deconditioning Z91.89 (ICD-10-CM) - Sedentary lifestyle. Patient presents with primary complaints of bilateral knee pain and bilateral lower extremity burning sensation with walking. pt presents with moderate knee pain " today. Tolerated session fairly. improvement in knee extension noted today. fatigued easily with ambulation with burning pain in LEs. Able to resolve with brief sitting rest breals.  Will monitor response.  Evaluation/Treatment Tolerance: Patient limited by fatigue    Patient will continue to benefit from skilled outpatient physical therapy to address the deficits listed in the problem list box on initial evaluation, provide pt/family education and to maximize pt's level of independence in the home and community environment.     Patient's spiritual, cultural, and educational needs considered and patient agreeable to plan of care and goals.           Plan: Cont treatment per POC    Goals:   Active       long term goals       patient will improve FOTO score to 55% to improve functional mobility (Progressing)       Start:  03/11/25    Expected End:  05/09/25            patient will improve knee flexion to 105+ to improve performance of ADLs  (Progressing)       Start:  03/11/25    Expected End:  05/09/25            patient will improve lower extremity manual muscle tests by 1/2 grade to improve strength (Progressing)       Start:  03/11/25    Expected End:  05/09/25               short term goals       patient will demonstrate independence with home exercise program  (Progressing)       Start:  03/11/25    Expected End:  04/11/25            patient will improve knee range of motion by 5+ degrees to improve functional mobility (Progressing)       Start:  03/11/25    Expected End:  04/11/25            patient will be able to ambulate 3 laps with rolling walker without rest break (Progressing)       Start:  03/11/25    Expected End:  04/11/25                Danna Godfrey, PT, DPT

## 2025-04-02 ENCOUNTER — CLINICAL SUPPORT (OUTPATIENT)
Dept: REHABILITATION | Facility: HOSPITAL | Age: 65
End: 2025-04-02
Payer: COMMERCIAL

## 2025-04-02 DIAGNOSIS — M25.662 DECREASED RANGE OF MOTION (ROM) OF BOTH KNEES: ICD-10-CM

## 2025-04-02 DIAGNOSIS — R68.89 DECREASED STRENGTH, ENDURANCE, AND MOBILITY: Primary | ICD-10-CM

## 2025-04-02 DIAGNOSIS — Z74.09 DECREASED STRENGTH, ENDURANCE, AND MOBILITY: Primary | ICD-10-CM

## 2025-04-02 DIAGNOSIS — R53.1 DECREASED STRENGTH, ENDURANCE, AND MOBILITY: Primary | ICD-10-CM

## 2025-04-02 DIAGNOSIS — M25.661 DECREASED RANGE OF MOTION (ROM) OF BOTH KNEES: ICD-10-CM

## 2025-04-02 PROCEDURE — 97530 THERAPEUTIC ACTIVITIES: CPT | Mod: PN,CQ

## 2025-04-02 PROCEDURE — 97112 NEUROMUSCULAR REEDUCATION: CPT | Mod: PN,CQ

## 2025-04-02 PROCEDURE — 97110 THERAPEUTIC EXERCISES: CPT | Mod: PN,CQ

## 2025-04-02 NOTE — PROGRESS NOTES
"  Outpatient Rehab    Physical Therapy Visit    Patient Name: Shelley Levy  MRN: [id number]  YOB: 1960  Encounter Date: 4/2/2025    Therapy Diagnosis:   Encounter Diagnoses   Name Primary?    Decreased strength, endurance, and mobility Yes    Decreased range of motion (ROM) of both knees      Physician: Corey Hernandez MD    Physician Orders: Eval and Treat  Medical Diagnosis: Physical deconditioning  Sedentary lifestyle    Visit # / Visits Authorized:  3 / 20  Insurance Authorization Period: 3/10/2025 to 12/31/2025  Date of Evaluation: 3/10/2025   Plan of Care Certification: 3/10/2025 to 5/9/25      PT/PTA: PTA   Number of PTA visits since last PT visit:1  Time In: 1658   Time Out: 1758  Total Time: 60   Total Billable Time: 60    FOTO:  Intake Score:  %  Survey Score 1:  %  Survey Score 2:  %         Subjective   Usual knee pain with walking, no significant change in symptoms..  Pain reported as 5/10. Left knee/physical deconditioning    Objective            Treatment:  Therapeutic Exercise  TE 1: Ankle PF 2 x 20 RTB  TE 2: Seated heel/toe raises 2 x 10  TE 5: nustep L1 6'  Balance/Neuromuscular Re-Education  NMR 1: seated marches 10x2  NMR 2: seated hip abduction 10x2 GTB  NMR 3: Seated hip adduction 2x10  NMR 4: Seated LAQ 2 x 10  NMR 5: Standing foot taps on 4" step 2 x 30"  Therapeutic Activity  TA 1: walking intervals [id number]  TA 2: Sit to stand 2 x 7    Time Entry(in minutes):  Neuromuscular Re-Education Time Entry: 40  Therapeutic Activity Time Entry: 10  Therapeutic Exercise Time Entry: 10    Assessment & Plan   Assessment: Patient is a 64 y.o. F with a medical diagnosis of R53.81 (ICD-10-CM) - Physical deconditioning Z91.89 (ICD-10-CM) - Sedentary lifestyle. Patient presents with primary complaints of bilateral knee pain and bilateral lower extremity burning sensation with walking. Able to increased walking to 3 laps today. Tolerated fairly well.  Will monitor response.  Evaluation/Treatment " Tolerance: Patient limited by fatigue    Patient will continue to benefit from skilled outpatient physical therapy to address the deficits listed in the problem list box on initial evaluation, provide pt/family education and to maximize pt's level of independence in the home and community environment.     Patient's spiritual, cultural, and educational needs considered and patient agreeable to plan of care and goals.           Plan: Cont treatment per POC    Goals:   Active       long term goals       patient will improve FOTO score to 55% to improve functional mobility (Progressing)       Start:  03/11/25    Expected End:  05/09/25            patient will improve knee flexion to 105+ to improve performance of ADLs  (Progressing)       Start:  03/11/25    Expected End:  05/09/25            patient will improve lower extremity manual muscle tests by 1/2 grade to improve strength (Progressing)       Start:  03/11/25    Expected End:  05/09/25               short term goals       patient will demonstrate independence with home exercise program  (Progressing)       Start:  03/11/25    Expected End:  04/11/25            patient will improve knee range of motion by 5+ degrees to improve functional mobility (Progressing)       Start:  03/11/25    Expected End:  04/11/25            patient will be able to ambulate 3 laps with rolling walker without rest break (Progressing)       Start:  03/11/25    Expected End:  04/11/25                Rdaha Bond, PTA

## 2025-04-03 ENCOUNTER — CLINICAL SUPPORT (OUTPATIENT)
Dept: REHABILITATION | Facility: HOSPITAL | Age: 65
End: 2025-04-03
Payer: COMMERCIAL

## 2025-04-03 DIAGNOSIS — M25.662 DECREASED RANGE OF MOTION (ROM) OF BOTH KNEES: ICD-10-CM

## 2025-04-03 DIAGNOSIS — R53.1 DECREASED STRENGTH, ENDURANCE, AND MOBILITY: Primary | ICD-10-CM

## 2025-04-03 DIAGNOSIS — R68.89 DECREASED STRENGTH, ENDURANCE, AND MOBILITY: Primary | ICD-10-CM

## 2025-04-03 DIAGNOSIS — Z74.09 DECREASED STRENGTH, ENDURANCE, AND MOBILITY: Primary | ICD-10-CM

## 2025-04-03 DIAGNOSIS — M25.661 DECREASED RANGE OF MOTION (ROM) OF BOTH KNEES: ICD-10-CM

## 2025-04-03 PROCEDURE — 97110 THERAPEUTIC EXERCISES: CPT | Mod: PN

## 2025-04-03 PROCEDURE — 97112 NEUROMUSCULAR REEDUCATION: CPT | Mod: PN

## 2025-04-03 PROCEDURE — 97530 THERAPEUTIC ACTIVITIES: CPT | Mod: PN

## 2025-04-03 NOTE — PROGRESS NOTES
"  Outpatient Rehab    Physical Therapy Visit    Patient Name: Shelley Levy  MRN: [id number]  YOB: 1960  Encounter Date: 4/3/2025    Therapy Diagnosis:   Encounter Diagnoses   Name Primary?    Decreased strength, endurance, and mobility Yes    Decreased range of motion (ROM) of both knees      Physician: Corey Hernandez MD    Physician Orders: Eval and Treat  Medical Diagnosis: Physical deconditioning  Sedentary lifestyle    Visit # / Visits Authorized:  4 / 20  Insurance Authorization Period: 3/10/2025 to 12/31/2025  Date of Evaluation: 3/10/2025   Plan of Care Certification: 3/10/2025 to 5/9/25      PT/PTA: PT   Number of PTA visits since last PT visit:1  Time In: 1510   Time Out: 1603  Total Time: 53   Total Billable Time: 53    FOTO:  Intake Score: 45%  Survey Score 1: 48%  Survey Score 2:  %         Subjective   a little soreness and stiffness today. arrived late due to need to use bathroom before session..  Pain reported as 6/10. Left knee/physical deconditioning    Objective            Treatment:  Therapeutic Exercise  TE 2: Seated heel/toe raises 2 x 10  TE 5: nustep L1 6'  TE 6: hamstring curls YTB 1x10  Balance/Neuromuscular Re-Education  NMR 1: seated marches 2x10 YTB  NMR 2: seated hip abduction 3x10 GTB  NMR 3: Seated hip adduction 2x10  NMR 5: Standing foot taps on 4" step 2 x 30"  Therapeutic Activity  TA 1: walking intervals [id number]    Time Entry(in minutes):  Neuromuscular Re-Education Time Entry: 35  Therapeutic Activity Time Entry: 8  Therapeutic Exercise Time Entry: 10    Assessment & Plan   Assessment: Patient is a 64 y.o. F with a medical diagnosis of R53.81 (ICD-10-CM) - Physical deconditioning Z91.89 (ICD-10-CM) - Sedentary lifestyle. Patient presents with primary complaints of bilateral knee pain and bilateral lower extremity burning sensation with walking. pt presents with moderate soreness following yesterdays session. minimal progressions made today. performed FOTO during " rest break with slight improvement. able to only complete 2 laps due to time constraint. continue to progress as tolerated  Evaluation/Treatment Tolerance: Patient limited by fatigue    Patient will continue to benefit from skilled outpatient physical therapy to address the deficits listed in the problem list box on initial evaluation, provide pt/family education and to maximize pt's level of independence in the home and community environment.     Patient's spiritual, cultural, and educational needs considered and patient agreeable to plan of care and goals.           Plan: Cont treatment per POC    Goals:   Active       long term goals       patient will improve FOTO score to 55% to improve functional mobility (Progressing)       Start:  03/11/25    Expected End:  05/09/25            patient will improve knee flexion to 105+ to improve performance of ADLs  (Progressing)       Start:  03/11/25    Expected End:  05/09/25            patient will improve lower extremity manual muscle tests by 1/2 grade to improve strength (Progressing)       Start:  03/11/25    Expected End:  05/09/25               short term goals       patient will demonstrate independence with home exercise program  (Progressing)       Start:  03/11/25    Expected End:  04/11/25            patient will improve knee range of motion by 5+ degrees to improve functional mobility (Progressing)       Start:  03/11/25    Expected End:  04/11/25            patient will be able to ambulate 3 laps with rolling walker without rest break (Progressing)       Start:  03/11/25    Expected End:  04/11/25                Danna Godfrey, PT, DPT

## 2025-04-07 ENCOUNTER — CLINICAL SUPPORT (OUTPATIENT)
Dept: REHABILITATION | Facility: HOSPITAL | Age: 65
End: 2025-04-07
Payer: COMMERCIAL

## 2025-04-07 DIAGNOSIS — Z74.09 DECREASED STRENGTH, ENDURANCE, AND MOBILITY: Primary | ICD-10-CM

## 2025-04-07 DIAGNOSIS — M25.662 DECREASED RANGE OF MOTION (ROM) OF BOTH KNEES: ICD-10-CM

## 2025-04-07 DIAGNOSIS — R53.1 DECREASED STRENGTH, ENDURANCE, AND MOBILITY: Primary | ICD-10-CM

## 2025-04-07 DIAGNOSIS — M25.661 DECREASED RANGE OF MOTION (ROM) OF BOTH KNEES: ICD-10-CM

## 2025-04-07 DIAGNOSIS — R68.89 DECREASED STRENGTH, ENDURANCE, AND MOBILITY: Primary | ICD-10-CM

## 2025-04-07 PROCEDURE — 97112 NEUROMUSCULAR REEDUCATION: CPT | Mod: PN

## 2025-04-07 PROCEDURE — 97530 THERAPEUTIC ACTIVITIES: CPT | Mod: PN

## 2025-04-07 PROCEDURE — 97110 THERAPEUTIC EXERCISES: CPT | Mod: PN

## 2025-04-07 NOTE — PROGRESS NOTES
"  Outpatient Rehab    Physical Therapy Visit    Patient Name: Shelley Levy  MRN: [id number]  YOB: 1960  Encounter Date: 4/7/2025    Therapy Diagnosis:   Encounter Diagnoses   Name Primary?    Decreased strength, endurance, and mobility Yes    Decreased range of motion (ROM) of both knees      Physician: Corey Hernandez MD    Physician Orders: Eval and Treat  Medical Diagnosis: Physical deconditioning  Sedentary lifestyle    Visit # / Visits Authorized:  5 / 20  Insurance Authorization Period: 3/10/2025 to 12/31/2025  Date of Evaluation: 3/10/2025   Plan of Care Certification: 3/10/2025 to 5/9/25      PT/PTA: PT   Number of PTA visits since last PT visit:0  Time In: 1405   Time Out: 1500  Total Time: 55   Total Billable Time: 55    FOTO:  Intake Score:  %  Survey Score 1:  %  Survey Score 2:  %         Subjective   B knee pain, left leg pain worse than right. No complaints after last session. 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 Walks with RW..  Pain reported as 7/10.      Objective            Treatment:  Therapeutic Exercise  TE 1: Standing heel/toe raises 2 x 10  TE 2: seated hamstring stretch 3x30" B  TE 3: hamstring curls RTB 2x15  TE 5: nustep L1 6'  TE 6: hamstring curls YTB 1x10  Balance/Neuromuscular Re-Education  NMR 1: SLR: 2x10 B  NMR 2: Hooklying hip abduction 3x10 GTB  NMR 3: Hooklying hip adduction 2x15x3'' holds  NMR 4: Seated LAQ 2x15 red TB  NMR 5: Standing foot taps on 4" step 2 x 30"  Therapeutic Activity  TA 1: walking intervals [id number]  TA 2: Sit to stand: 2x15    Time Entry(in minutes):  Neuromuscular Re-Education Time Entry: 30  Therapeutic Activity Time Entry: 10  Therapeutic Exercise Time Entry: 15    Assessment & Plan   Assessment: Patient is a 64 y.o. F with a medical diagnosis of R53.81 (ICD-10-CM) - Physical deconditioning Z91.89 (ICD-10-CM) - Sedentary lifestyle. Patient presents with primary complaints of bilateral knee pain and bilateral lower " extremity burning sensation with walking. Able to progress knee/lumbar loading, quad strengthening, and reported general muscle soreness at session's completion.       Patient will continue to benefit from skilled outpatient physical therapy to address the deficits listed in the problem list box on initial evaluation, provide pt/family education and to maximize pt's level of independence in the home and community environment.     Patient's spiritual, cultural, and educational needs considered and patient agreeable to plan of care and goals.           Plan:      Goals:   Active       long term goals       patient will improve FOTO score to 55% to improve functional mobility (Progressing)       Start:  03/11/25    Expected End:  05/09/25            patient will improve knee flexion to 105+ to improve performance of ADLs  (Progressing)       Start:  03/11/25    Expected End:  05/09/25            patient will improve lower extremity manual muscle tests by 1/2 grade to improve strength (Progressing)       Start:  03/11/25    Expected End:  05/09/25               short term goals       patient will demonstrate independence with home exercise program  (Progressing)       Start:  03/11/25    Expected End:  04/11/25            patient will improve knee range of motion by 5+ degrees to improve functional mobility (Progressing)       Start:  03/11/25    Expected End:  04/11/25            patient will be able to ambulate 3 laps with rolling walker without rest break (Progressing)       Start:  03/11/25    Expected End:  04/11/25                Zen Segal, PT, DPT

## 2025-04-10 ENCOUNTER — CLINICAL SUPPORT (OUTPATIENT)
Dept: REHABILITATION | Facility: HOSPITAL | Age: 65
End: 2025-04-10
Payer: COMMERCIAL

## 2025-04-10 DIAGNOSIS — Z74.09 DECREASED STRENGTH, ENDURANCE, AND MOBILITY: Primary | ICD-10-CM

## 2025-04-10 DIAGNOSIS — M25.662 DECREASED RANGE OF MOTION (ROM) OF BOTH KNEES: ICD-10-CM

## 2025-04-10 DIAGNOSIS — R68.89 DECREASED STRENGTH, ENDURANCE, AND MOBILITY: Primary | ICD-10-CM

## 2025-04-10 DIAGNOSIS — M25.661 DECREASED RANGE OF MOTION (ROM) OF BOTH KNEES: ICD-10-CM

## 2025-04-10 DIAGNOSIS — R53.1 DECREASED STRENGTH, ENDURANCE, AND MOBILITY: Primary | ICD-10-CM

## 2025-04-10 PROCEDURE — 97530 THERAPEUTIC ACTIVITIES: CPT | Mod: PN

## 2025-04-10 PROCEDURE — 97112 NEUROMUSCULAR REEDUCATION: CPT | Mod: PN

## 2025-04-10 PROCEDURE — 97110 THERAPEUTIC EXERCISES: CPT | Mod: PN

## 2025-04-10 NOTE — PROGRESS NOTES
"  Outpatient Rehab    Physical Therapy Visit    Patient Name: Shelley Levy  MRN: [id number]  YOB: 1960  Encounter Date: 4/10/2025    Therapy Diagnosis:   Encounter Diagnoses   Name Primary?    Decreased strength, endurance, and mobility Yes    Decreased range of motion (ROM) of both knees      Physician: Corey Hernandez MD    Physician Orders: Eval and Treat  Medical Diagnosis: Physical deconditioning  Sedentary lifestyle    Visit # / Visits Authorized:  6 / 20  Insurance Authorization Period: 3/10/2025 to 12/31/2025  Date of Evaluation: 3/10/2025   Plan of Care Certification: 3/10/2025 to 5/9/25      PT/PTA: PT   Number of PTA visits since last PT visit:0  Time In: 1400   Time Out: 1500  Total Time: 60   Total Billable Time: 60    FOTO:  Intake Score:  %  Survey Score 1:  %  Survey Score 2:  %         Subjective   moderate knee pain today, no new complaints.  Pain reported as 6/10. Left knee/physical deconditioning    Objective            Treatment:  Therapeutic Exercise  TE 1: Standing heel/toe raises 2 x 10  TE 2: seated hamstring stretch 3x30" B  TE 3: hamstring curls RTB 2x15  Balance/Neuromuscular Re-Education  NMR 1: SLR: 2x10 B  NMR 2: Hooklying hip abduction 2' 5" GTB  NMR 3: Hooklying hip adduction 2' 5" holds  NMR 4: Seated LAQ 2x15 red TB  NMR 5: Standing foot taps on 4" step 2 x 30"  Therapeutic Activity  TA 1: walking intervals [id number]  TA 2: Sit to stand: 2x15    Time Entry(in minutes):  Neuromuscular Re-Education Time Entry: 30  Therapeutic Activity Time Entry: 10  Therapeutic Exercise Time Entry: 20    Assessment & Plan   Assessment: Patient is a 64 y.o. F with a medical diagnosis of R53.81 (ICD-10-CM) - Physical deconditioning Z91.89 (ICD-10-CM) - Sedentary lifestyle. Patient presents with primary complaints of bilateral knee pain and bilateral lower extremity burning sensation with walking. increased walking laps today. mild unsteadiness with STS with uncontrolled descent. no " increased pain post treatment.  Evaluation/Treatment Tolerance: Patient tolerated treatment well    Patient will continue to benefit from skilled outpatient physical therapy to address the deficits listed in the problem list box on initial evaluation, provide pt/family education and to maximize pt's level of independence in the home and community environment.     Patient's spiritual, cultural, and educational needs considered and patient agreeable to plan of care and goals.           Plan: Cont treatment per POC    Goals:   Active       long term goals       patient will improve FOTO score to 55% to improve functional mobility (Progressing)       Start:  03/11/25    Expected End:  05/09/25            patient will improve knee flexion to 105+ to improve performance of ADLs  (Progressing)       Start:  03/11/25    Expected End:  05/09/25            patient will improve lower extremity manual muscle tests by 1/2 grade to improve strength (Progressing)       Start:  03/11/25    Expected End:  05/09/25               short term goals       patient will demonstrate independence with home exercise program  (Progressing)       Start:  03/11/25    Expected End:  04/11/25            patient will improve knee range of motion by 5+ degrees to improve functional mobility (Progressing)       Start:  03/11/25    Expected End:  04/11/25            patient will be able to ambulate 3 laps with rolling walker without rest break (Progressing)       Start:  03/11/25    Expected End:  04/11/25                Danna Godfrey, PT, DPT

## 2025-04-15 ENCOUNTER — OFFICE VISIT (OUTPATIENT)
Dept: OPHTHALMOLOGY | Facility: CLINIC | Age: 65
End: 2025-04-15
Payer: COMMERCIAL

## 2025-04-15 ENCOUNTER — CLINICAL SUPPORT (OUTPATIENT)
Dept: OPHTHALMOLOGY | Facility: CLINIC | Age: 65
End: 2025-04-15
Payer: COMMERCIAL

## 2025-04-15 DIAGNOSIS — H25.13 AGE-RELATED NUCLEAR CATARACT OF BOTH EYES: ICD-10-CM

## 2025-04-15 DIAGNOSIS — E11.3313 MODERATE NONPROLIFERATIVE DIABETIC RETINOPATHY OF BOTH EYES WITH MACULAR EDEMA ASSOCIATED WITH TYPE 2 DIABETES MELLITUS: Primary | ICD-10-CM

## 2025-04-15 DIAGNOSIS — H35.033 HYPERTENSIVE RETINOPATHY OF BOTH EYES: ICD-10-CM

## 2025-04-15 DIAGNOSIS — H43.813 VITREOUS DEGENERATION OF BOTH EYES: ICD-10-CM

## 2025-04-15 DIAGNOSIS — E11.3313 MODERATE NONPROLIFERATIVE DIABETIC RETINOPATHY OF BOTH EYES WITH MACULAR EDEMA ASSOCIATED WITH TYPE 2 DIABETES MELLITUS: ICD-10-CM

## 2025-04-15 PROCEDURE — 1160F RVW MEDS BY RX/DR IN RCRD: CPT | Mod: CPTII,S$GLB,, | Performed by: OPHTHALMOLOGY

## 2025-04-15 PROCEDURE — 92134 CPTRZ OPH DX IMG PST SGM RTA: CPT | Mod: S$GLB,,, | Performed by: OPHTHALMOLOGY

## 2025-04-15 PROCEDURE — 67028 INJECTION EYE DRUG: CPT | Mod: 50,S$GLB,, | Performed by: OPHTHALMOLOGY

## 2025-04-15 PROCEDURE — 4010F ACE/ARB THERAPY RXD/TAKEN: CPT | Mod: CPTII,S$GLB,, | Performed by: OPHTHALMOLOGY

## 2025-04-15 PROCEDURE — 99214 OFFICE O/P EST MOD 30 MIN: CPT | Mod: 25,S$GLB,, | Performed by: OPHTHALMOLOGY

## 2025-04-15 PROCEDURE — 1159F MED LIST DOCD IN RCRD: CPT | Mod: CPTII,S$GLB,, | Performed by: OPHTHALMOLOGY

## 2025-04-15 PROCEDURE — 99999 PR PBB SHADOW E&M-EST. PATIENT-LVL IV: CPT | Mod: PBBFAC,,, | Performed by: OPHTHALMOLOGY

## 2025-04-15 PROCEDURE — 2022F DILAT RTA XM EVC RTNOPTHY: CPT | Mod: CPTII,S$GLB,, | Performed by: OPHTHALMOLOGY

## 2025-04-15 PROCEDURE — 3044F HG A1C LEVEL LT 7.0%: CPT | Mod: CPTII,S$GLB,, | Performed by: OPHTHALMOLOGY

## 2025-04-15 NOTE — PROGRESS NOTES
HPI    Pt is here for 1 month DFE/OCT    Pt states that she has been having flashes and floater in OU. No pain or   discomfort. No sudden vision loss.   Last edited by Munir Conley MA on 4/15/2025  2:30 PM.         A/P    ICD-10-CM ICD-9-CM   1. 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 Vitreous degeneration of both eyes  H43.813 379.21   3. Hypertensive retinopathy of both eyes  H35.033 362.11   4. Age-related nuclear cataract of both eyes  H25.13 366.16         1. Moderate nonproliferative diabetic retinopathy of both eyes with macular edema associated with type 2 diabetes mellitus  2. Diabetic macular edema of both eyes  PCP  Corey Hernandez MD - Recent notes reviewed   02/18/2025  6.2  A1C     OD: s/p NEFTALY x4 2/14/25  VA 20/40 (stable), mod DR with IRF still, IOP 20      Plan: recommend Iluvien for better control of DME    OS: s/p NEFTALY x4 2/14/25  VA 20/200 (Was 20/150), hx of amblyopia, still with IRF , IOP good  Plan: recommend Iluvien for better control of DME    Based on todays exam, diagnostic studies, and review of records, the determination was made for treatment today.  Schedule Iluvien Injection both Eye today Patient chooses to proceed with injection R/B/A discussed including but not limited to: glaucoma, infection, retinal detachment and cataract    Patient identified.  Timeout performed.    Risks, benefits, and alternatives to treatment were discussed in detail with the patient, including bleeding/infection (endophthalmitis)/glaucoma/cataract, etc.  The patient voiced understanding and wished to proceed with the procedure.  See separate consent form.    Iluvien Injection Procedure Note:  Diagnosis: DME BOTH Eye    First we did right eye, same then for left eye     Topical Proparacaine drop placed then topical 5% Betadine.  Subconjunctival injection of 2% lidocaine placed without  complication.  Sterile gloves used, and sterile lid speculum placed.  5% Betadine at injection site again prior to injection.  Iluvien implant injected at  inferotemporally 3.5-4mm posterior to the limbus.  Complications: None  Va at least CF at 5 feet post injection.  Retina, ONH, IOP normal after injection.    Followup as below.  Patient should return immediately PRN.  Retinal Detachment and Endophthalmitis precautions given.      Visit today is associated with current or anticipated ongoing medical care related to this patients single serious condition/complex condition (diabetic macular edema)     Recommend good blood pressure control, tight blood glucose control, and good cholesterol control     3. Vitreous degeneration of both eyes  No RT/RD, stable     Plan: Observation      4. Hypertensive retinopathy of both eyes  Mild HTN changes  Plan: Observation  Recommend good blood pressure control, tight blood glucose control, and good cholesterol control     5. Age-related nuclear cataract of both eyes  VS NS  Amblyopia OS  Plan: can update Mrx      RTC 8 weeks DFE/OCTm OU       I saw and examined the patient and reviewed in detail the findings documented. The final examination findings, image interpretations which have been independently interpreted, and plan as documented in the record represent my personal judgment and conclusions.    Amari Tran MD  Vitreoretinal Surgery   [facility]

## 2025-04-17 ENCOUNTER — CLINICAL SUPPORT (OUTPATIENT)
Dept: REHABILITATION | Facility: HOSPITAL | Age: 65
End: 2025-04-17
Payer: COMMERCIAL

## 2025-04-17 DIAGNOSIS — R68.89 DECREASED STRENGTH, ENDURANCE, AND MOBILITY: Primary | ICD-10-CM

## 2025-04-17 DIAGNOSIS — M25.662 DECREASED RANGE OF MOTION (ROM) OF BOTH KNEES: ICD-10-CM

## 2025-04-17 DIAGNOSIS — Z74.09 DECREASED STRENGTH, ENDURANCE, AND MOBILITY: Primary | ICD-10-CM

## 2025-04-17 DIAGNOSIS — R53.1 DECREASED STRENGTH, ENDURANCE, AND MOBILITY: Primary | ICD-10-CM

## 2025-04-17 DIAGNOSIS — M25.661 DECREASED RANGE OF MOTION (ROM) OF BOTH KNEES: ICD-10-CM

## 2025-04-17 PROCEDURE — 97530 THERAPEUTIC ACTIVITIES: CPT | Mod: PN,CQ

## 2025-04-17 PROCEDURE — 97112 NEUROMUSCULAR REEDUCATION: CPT | Mod: PN,CQ

## 2025-04-17 NOTE — PROGRESS NOTES
"  Outpatient Rehab    Physical Therapy Visit    Patient Name: Shelley Levy  MRN: [id number]  YOB: 1960  Encounter Date: 4/17/2025    Therapy Diagnosis:   Encounter Diagnoses   Name Primary?    Decreased strength, endurance, and mobility Yes    Decreased range of motion (ROM) of both knees      Physician: Corey Hernandez MD    Physician Orders: Eval and Treat  Medical Diagnosis: Physical deconditioning  Sedentary lifestyle    Visit # / Visits Authorized:  7 / 20  Insurance Authorization Period: 3/10/2025 to 12/31/2025  Date of Evaluation: 3/10/2025   Plan of Care Certification: 3/10/2025 to 5/9/25      PT/PTA: PTA   Number of PTA visits since last PT visit:1  Time In: 1400   Time Out: 1455  Total Time: 55   Total Billable Time: 55    FOTO:  Intake Score:  %  Survey Score 1:  %  Survey Score 2:  %         Subjective   moderate knee pain today, no new complaints.  Pain reported as 7/10. Left knee/physical deconditioning    Objective            Treatment:  Balance/Neuromuscular Re-Education  NMR 1: SLR: 2x10 B  NMR 2: Hooklying hip abduction 2' 5" GTB  NMR 3: Hooklying hip adduction 2' 5" holds  NMR 4: Seated LAQ 2x15 red TB  NMR 5: Standing foot taps on 4" step 2 x 30"  Therapeutic Activity  TA 1: walking intervals [id number]  TA 2: Sit to stand: 2x15  TA 3: Standing heel/toe raises 2 x 10    Time Entry(in minutes):  Neuromuscular Re-Education Time Entry: 30  Therapeutic Activity Time Entry: 25    Assessment & Plan   Assessment: Patient is a 64 y.o. F with a medical diagnosis of R53.81 (ICD-10-CM) - Physical deconditioning Z91.89 (ICD-10-CM) - Sedentary lifestyle. Patient presents with primary complaints of bilateral knee pain and bilateral lower extremity burning sensation with walking. Increased cuing required for STS for better eccentric lowering. no increased pain post treatment.  Evaluation/Treatment Tolerance: Patient tolerated treatment well    Patient will continue to benefit from skilled outpatient " physical therapy to address the deficits listed in the problem list box on initial evaluation, provide pt/family education and to maximize pt's level of independence in the home and community environment.     Patient's spiritual, cultural, and educational needs considered and patient agreeable to plan of care and goals.           Plan: Cont treatment per POC    Goals:   Active       long term goals       patient will improve FOTO score to 55% to improve functional mobility (Progressing)       Start:  03/11/25    Expected End:  05/09/25            patient will improve knee flexion to 105+ to improve performance of ADLs  (Progressing)       Start:  03/11/25    Expected End:  05/09/25            patient will improve lower extremity manual muscle tests by 1/2 grade to improve strength (Progressing)       Start:  03/11/25    Expected End:  05/09/25               short term goals       patient will demonstrate independence with home exercise program  (Progressing)       Start:  03/11/25    Expected End:  04/11/25            patient will improve knee range of motion by 5+ degrees to improve functional mobility (Progressing)       Start:  03/11/25    Expected End:  04/11/25            patient will be able to ambulate 3 laps with rolling walker without rest break (Progressing)       Start:  03/11/25    Expected End:  04/11/25                Radha Bond, PTA

## 2025-04-21 ENCOUNTER — CLINICAL SUPPORT (OUTPATIENT)
Dept: REHABILITATION | Facility: HOSPITAL | Age: 65
End: 2025-04-21
Payer: COMMERCIAL

## 2025-04-21 DIAGNOSIS — M25.662 DECREASED RANGE OF MOTION (ROM) OF BOTH KNEES: ICD-10-CM

## 2025-04-21 DIAGNOSIS — Z74.09 DECREASED STRENGTH, ENDURANCE, AND MOBILITY: Primary | ICD-10-CM

## 2025-04-21 DIAGNOSIS — M25.661 DECREASED RANGE OF MOTION (ROM) OF BOTH KNEES: ICD-10-CM

## 2025-04-21 DIAGNOSIS — R68.89 DECREASED STRENGTH, ENDURANCE, AND MOBILITY: Primary | ICD-10-CM

## 2025-04-21 DIAGNOSIS — R53.1 DECREASED STRENGTH, ENDURANCE, AND MOBILITY: Primary | ICD-10-CM

## 2025-04-21 PROCEDURE — 97530 THERAPEUTIC ACTIVITIES: CPT | Mod: PN,CQ

## 2025-04-21 PROCEDURE — 97112 NEUROMUSCULAR REEDUCATION: CPT | Mod: PN,CQ

## 2025-04-21 NOTE — PROGRESS NOTES
"  Outpatient Rehab    Physical Therapy Visit    Patient Name: Shelley Levy  MRN: [id number]  YOB: 1960  Encounter Date: 4/21/2025    Therapy Diagnosis:   Encounter Diagnoses   Name Primary?    Decreased strength, endurance, and mobility Yes    Decreased range of motion (ROM) of both knees      Physician: Corey Hernandez MD    Physician Orders: Eval and Treat  Medical Diagnosis: Physical deconditioning  Sedentary lifestyle    Visit # / Visits Authorized:  8 / 20  Insurance Authorization Period: 3/10/2025 to 12/31/2025  Date of Evaluation: 3/10/2025   Plan of Care Certification: 3/10/2025 to 5/9/25      PT/PTA: PTA   Number of PTA visits since last PT visit:2  Time In: 1400   Time Out: 1455  Total Time: 55   Total Billable Time: 30    FOTO:  Intake Score:  %  Survey Score 1:  %  Survey Score 2:  %         Subjective   No new complaints..  Pain reported as 7/10. Left knee/physical deconditioning    Objective            Treatment:  Balance/Neuromuscular Re-Education  NMR 1: SLR: 2x10 B  NMR 2: Hooklying hip abduction 2' 5" GTB  NMR 3: Hooklying hip adduction 2' 5" holds  NMR 4: Seated LAQ 2x15 red TB  NMR 5: Standing foot taps on 4" step 2 x 30"  NMR 6: Standing hip abd 2 x 10  Therapeutic Activity  TA 1: walking intervals 1g185kg  TA 2: Sit to stand: 2x15  TA 3: Standing heel/toe raises 2 x 10    Time Entry(in minutes):  Neuromuscular Re-Education Time Entry: 15  Therapeutic Activity Time Entry: 15    Assessment & Plan   Assessment: Patient is a 64 y.o. F with a medical diagnosis of R53.81 (ICD-10-CM) - Physical deconditioning Z91.89 (ICD-10-CM) - Sedentary lifestyle. Patient presents with primary complaints of bilateral knee pain and bilateral lower extremity burning sensation with walking. Continues to fatigue after one walking loop requiring seated rest break for recovery. Limited by endurance and knee pain today. Monitor response. Progress as appropriate,  Evaluation/Treatment Tolerance: Patient " limited by fatigue    Patient will continue to benefit from skilled outpatient physical therapy to address the deficits listed in the problem list box on initial evaluation, provide pt/family education and to maximize pt's level of independence in the home and community environment.     Patient's spiritual, cultural, and educational needs considered and patient agreeable to plan of care and goals.           Plan: Cont treatment per POC    Goals:   Active       long term goals       patient will improve FOTO score to 55% to improve functional mobility (Progressing)       Start:  03/11/25    Expected End:  05/09/25            patient will improve knee flexion to 105+ to improve performance of ADLs  (Progressing)       Start:  03/11/25    Expected End:  05/09/25            patient will improve lower extremity manual muscle tests by 1/2 grade to improve strength (Progressing)       Start:  03/11/25    Expected End:  05/09/25               short term goals       patient will demonstrate independence with home exercise program  (Progressing)       Start:  03/11/25    Expected End:  04/11/25            patient will improve knee range of motion by 5+ degrees to improve functional mobility (Progressing)       Start:  03/11/25    Expected End:  04/11/25            patient will be able to ambulate 3 laps with rolling walker without rest break (Progressing)       Start:  03/11/25    Expected End:  04/11/25                Radha Bond, PTA

## 2025-04-24 ENCOUNTER — DOCUMENTATION ONLY (OUTPATIENT)
Dept: REHABILITATION | Facility: HOSPITAL | Age: 65
End: 2025-04-24

## 2025-04-24 ENCOUNTER — CLINICAL SUPPORT (OUTPATIENT)
Dept: REHABILITATION | Facility: HOSPITAL | Age: 65
End: 2025-04-24
Payer: COMMERCIAL

## 2025-04-24 DIAGNOSIS — R53.1 DECREASED STRENGTH, ENDURANCE, AND MOBILITY: Primary | ICD-10-CM

## 2025-04-24 DIAGNOSIS — R68.89 DECREASED STRENGTH, ENDURANCE, AND MOBILITY: Primary | ICD-10-CM

## 2025-04-24 DIAGNOSIS — M25.661 DECREASED RANGE OF MOTION (ROM) OF BOTH KNEES: ICD-10-CM

## 2025-04-24 DIAGNOSIS — M25.662 DECREASED RANGE OF MOTION (ROM) OF BOTH KNEES: ICD-10-CM

## 2025-04-24 DIAGNOSIS — Z74.09 DECREASED STRENGTH, ENDURANCE, AND MOBILITY: Primary | ICD-10-CM

## 2025-04-24 PROCEDURE — 97530 THERAPEUTIC ACTIVITIES: CPT | Mod: PN

## 2025-04-24 PROCEDURE — 97112 NEUROMUSCULAR REEDUCATION: CPT | Mod: PN

## 2025-04-24 PROCEDURE — 97110 THERAPEUTIC EXERCISES: CPT | Mod: PN

## 2025-04-24 NOTE — PROGRESS NOTES
"  Outpatient Rehab    Physical Therapy Visit    Patient Name: Shelley Levy  MRN: [id number]  YOB: 1960  Encounter Date: 4/24/2025    Therapy Diagnosis:   Encounter Diagnoses   Name Primary?    Decreased strength, endurance, and mobility Yes    Decreased range of motion (ROM) of both knees      Physician: Corey Hernandez MD    Physician Orders: Eval and Treat  Medical Diagnosis: Physical deconditioning  Sedentary lifestyle    Visit # / Visits Authorized:  9 / 20  Insurance Authorization Period: 3/10/2025 to 12/31/2025  Date of Evaluation: 3/10/2025   Plan of Care Certification: 3/10/2025 to 5/9/25      PT/PTA:     Number of PTA visits since last PT visit:   Time In: 1400   Time Out: 1455  Total Time: 55   Total Billable Time: 55    FOTO:  Intake Score:  %  Survey Score 1:  %  Survey Score 2:  %         Subjective   increased pain in legs and back 10/10. difficulty dressing. has neuropathy in hands..  Pain reported as 10/10. Left knee/physical deconditioning, R knee    Objective            Treatment:  Therapeutic Exercise  TE 1: Standing heel/toe raises 2 x 10  TE 2: seated hamstring stretch 3x30" B  TE 3: hamstring curls RTB 2x15  TE 4: seated marches RTB 2x10 B  TE 7: knee dangle B 7#  TE 8: knee flexion to step 10x B  Balance/Neuromuscular Re-Education  NMR 1: SLR: 2x10 B  NMR 2: Hooklying hip abduction 2' 5" GTB  NMR 3: Hooklying hip adduction 2' 5" holds  NMR 4: Seated LAQ 2x15 red TB  NMR 5: Standing foot taps on 4" step 2 x 30"  NMR 7: quad set 2x10 B  Therapeutic Activity  TA 1: walking intervals [id number]--> 1x    Time Entry(in minutes):  Neuromuscular Re-Education Time Entry: 30  Therapeutic Activity Time Entry: 8  Therapeutic Exercise Time Entry: 17    Assessment & Plan   Assessment: Patient is a 64 y.o. F with a medical diagnosis of R53.81 (ICD-10-CM) - Physical deconditioning Z91.89 (ICD-10-CM) - Sedentary lifestyle. Patient presents with primary complaints of bilateral knee pain and bilateral " lower extremity burning sensation with walking rated 10/10. only performed one lap of walking laps with antalgic gait pattern worsening by 100ft. pain remained 10/10 throughout session. declined ice post treatment. Monitor response. Progress as appropriate.  Evaluation/Treatment Tolerance: Patient limited by pain    Patient will continue to benefit from skilled outpatient physical therapy to address the deficits listed in the problem list box on initial evaluation, provide pt/family education and to maximize pt's level of independence in the home and community environment.     Patient's spiritual, cultural, and educational needs considered and patient agreeable to plan of care and goals.           Plan: Cont treatment per POC    Goals:   Active       long term goals       patient will improve FOTO score to 55% to improve functional mobility (Progressing)       Start:  03/11/25    Expected End:  05/09/25            patient will improve knee flexion to 105+ to improve performance of ADLs  (Progressing)       Start:  03/11/25    Expected End:  05/09/25            patient will improve lower extremity manual muscle tests by 1/2 grade to improve strength (Progressing)       Start:  03/11/25    Expected End:  05/09/25               short term goals       patient will demonstrate independence with home exercise program  (Progressing)       Start:  03/11/25    Expected End:  04/11/25            patient will improve knee range of motion by 5+ degrees to improve functional mobility (Progressing)       Start:  03/11/25    Expected End:  04/11/25            patient will be able to ambulate 3 laps with rolling walker without rest break (Progressing)       Start:  03/11/25    Expected End:  04/11/25                Danna Godfrey, PT, DPT

## 2025-04-24 NOTE — PROGRESS NOTES
Physical therapist and physical therapy assistant(s) met face to face to discuss patient's treatment plan and progress towards established goals. Pt will be seen by a physical therapist minimally every 6th visit or every 30 days.    Radha Bond, PTA  4/24/2025

## 2025-04-25 ENCOUNTER — TELEPHONE (OUTPATIENT)
Dept: PODIATRY | Facility: CLINIC | Age: 65
End: 2025-04-25
Payer: COMMERCIAL

## 2025-04-25 NOTE — TELEPHONE ENCOUNTER
Call Patient in regards to appointment on May 7 with Dr. Prado due to him no longer working after 3:30 patient answer and was made aware and I was able to get rescheduled for Dr. Prado next availability. Patient also rescheduled  luigi Levy appointment as well. Patient verbally confirmed her and  Luigi Levy new appointment date and time.

## 2025-04-28 ENCOUNTER — CLINICAL SUPPORT (OUTPATIENT)
Dept: REHABILITATION | Facility: HOSPITAL | Age: 65
End: 2025-04-28
Payer: COMMERCIAL

## 2025-04-28 DIAGNOSIS — Z74.09 DECREASED STRENGTH, ENDURANCE, AND MOBILITY: Primary | ICD-10-CM

## 2025-04-28 DIAGNOSIS — R53.1 DECREASED STRENGTH, ENDURANCE, AND MOBILITY: Primary | ICD-10-CM

## 2025-04-28 DIAGNOSIS — M25.662 DECREASED RANGE OF MOTION (ROM) OF BOTH KNEES: ICD-10-CM

## 2025-04-28 DIAGNOSIS — M25.661 DECREASED RANGE OF MOTION (ROM) OF BOTH KNEES: ICD-10-CM

## 2025-04-28 DIAGNOSIS — R68.89 DECREASED STRENGTH, ENDURANCE, AND MOBILITY: Primary | ICD-10-CM

## 2025-04-28 PROCEDURE — 97140 MANUAL THERAPY 1/> REGIONS: CPT | Mod: PN,CQ

## 2025-04-28 PROCEDURE — 97110 THERAPEUTIC EXERCISES: CPT | Mod: PN,CQ

## 2025-04-28 PROCEDURE — 97112 NEUROMUSCULAR REEDUCATION: CPT | Mod: PN,CQ

## 2025-04-28 NOTE — PROGRESS NOTES
"  Outpatient Rehab    Physical Therapy Visit    Patient Name: Shelley Levy  MRN: [id number]  YOB: 1960  Encounter Date: 4/28/2025    Therapy Diagnosis:   Encounter Diagnoses   Name Primary?    Decreased strength, endurance, and mobility Yes    Decreased range of motion (ROM) of both knees      Physician: Corey Hernandez MD    Physician Orders: Eval and Treat  Medical Diagnosis: Physical deconditioning  Sedentary lifestyle    Visit # / Visits Authorized:  10 / 20  Insurance Authorization Period: 3/10/2025 to 12/31/2025  Date of Evaluation: 3/10/2025   Plan of Care Certification: 3/10/2025 to 5/9/25      PT/PTA: PTA   Number of PTA visits since last PT visit:2  Time In: 1410   Time Out: 1500  Total Time: 50   Total Billable Time: 50    FOTO:  Intake Score:  %  Survey Score 1:  %  Survey Score 2:  %         Subjective   Ongoing 10/10 pain in legs and lower back. Difficulty with ADLs..  Pain reported as 10/10. Left knee/physical deconditioning, R knee    Objective            Treatment:  Therapeutic Exercise  TE 1: Standing heel/toe raises 2 x 10  TE 2: seated hamstring stretch 3x30" B  TE 7: knee dangle B 7#  Manual Therapy  MT 1: Patellar mobs grade I-II  MT 2: Tibiofemoral distraction EOM  Balance/Neuromuscular Re-Education  NMR 1: SLR: 2x10 B  NMR 2: Hooklying hip abduction 2' 5" GTB  NMR 3: Hooklying hip adduction 2' 5" holds  NMR 7: quad set 2x10 B    Time Entry(in minutes):  Manual Therapy Time Entry: 10  Neuromuscular Re-Education Time Entry: 25  Therapeutic Exercise Time Entry: 15    Assessment & Plan   Assessment: Patient is a 64 y.o. F with a medical diagnosis of R53.81 (ICD-10-CM) - Physical deconditioning Z91.89 (ICD-10-CM) - Sedentary lifestyle. Patient presents with primary complaints of bilateral knee pain and bilateral lower extremity burning sensation with walking rated 10/10. Treatment focused on pain modulation and gentle quad and hip strengthening. Good response to tibiofemoral " distraction reporting some relief.  Monitor response. Progress as appropriate.  Evaluation/Treatment Tolerance: Patient limited by pain    Patient will continue to benefit from skilled outpatient physical therapy to address the deficits listed in the problem list box on initial evaluation, provide pt/family education and to maximize pt's level of independence in the home and community environment.     Patient's spiritual, cultural, and educational needs considered and patient agreeable to plan of care and goals.           Plan: Cont treatment per POC    Goals:   Active       long term goals       patient will improve FOTO score to 55% to improve functional mobility (Progressing)       Start:  03/11/25    Expected End:  05/09/25            patient will improve knee flexion to 105+ to improve performance of ADLs  (Progressing)       Start:  03/11/25    Expected End:  05/09/25            patient will improve lower extremity manual muscle tests by 1/2 grade to improve strength (Progressing)       Start:  03/11/25    Expected End:  05/09/25               short term goals       patient will demonstrate independence with home exercise program  (Progressing)       Start:  03/11/25    Expected End:  04/11/25            patient will improve knee range of motion by 5+ degrees to improve functional mobility (Progressing)       Start:  03/11/25    Expected End:  04/11/25            patient will be able to ambulate 3 laps with rolling walker without rest break (Progressing)       Start:  03/11/25    Expected End:  04/11/25                Radha Bond PTA

## 2025-05-01 ENCOUNTER — CLINICAL SUPPORT (OUTPATIENT)
Dept: REHABILITATION | Facility: HOSPITAL | Age: 65
End: 2025-05-01
Payer: COMMERCIAL

## 2025-05-01 DIAGNOSIS — Z74.09 DECREASED STRENGTH, ENDURANCE, AND MOBILITY: Primary | ICD-10-CM

## 2025-05-01 DIAGNOSIS — M25.662 DECREASED RANGE OF MOTION (ROM) OF BOTH KNEES: ICD-10-CM

## 2025-05-01 DIAGNOSIS — R53.1 DECREASED STRENGTH, ENDURANCE, AND MOBILITY: Primary | ICD-10-CM

## 2025-05-01 DIAGNOSIS — R68.89 DECREASED STRENGTH, ENDURANCE, AND MOBILITY: Primary | ICD-10-CM

## 2025-05-01 DIAGNOSIS — M25.661 DECREASED RANGE OF MOTION (ROM) OF BOTH KNEES: ICD-10-CM

## 2025-05-01 PROCEDURE — 97140 MANUAL THERAPY 1/> REGIONS: CPT | Mod: PN,CQ

## 2025-05-01 PROCEDURE — 97112 NEUROMUSCULAR REEDUCATION: CPT | Mod: PN,CQ

## 2025-05-01 PROCEDURE — 97110 THERAPEUTIC EXERCISES: CPT | Mod: PN,CQ

## 2025-05-01 NOTE — PROGRESS NOTES
"  Outpatient Rehab    Physical Therapy Visit    Patient Name: Shelley Levy  MRN: [id number]  YOB: 1960  Encounter Date: 5/1/2025    Therapy Diagnosis:   Encounter Diagnoses   Name Primary?    Decreased strength, endurance, and mobility Yes    Decreased range of motion (ROM) of both knees      Physician: Corey Hernandez MD    Physician Orders: Eval and Treat  Medical Diagnosis: Physical deconditioning  Sedentary lifestyle    Visit # / Visits Authorized:  11 / 20  Insurance Authorization Period: 3/10/2025 to 12/31/2025  Date of Evaluation: 3/10/2025   Plan of Care Certification: 3/10/2025 to 5/9/25      PT/PTA: PTA   Number of PTA visits since last PT visit:2  Time In: 1400   Time Out: 1455  Total Time: 55   Total Billable Time: 55    FOTO:  Intake Score:  %  Survey Score 1:  %  Survey Score 2:  %         Subjective   Since yesterday pain has been declining.  Pain reported as 7/10. Left knee/physical deconditioning, R knee    Objective            Treatment:  Therapeutic Exercise  TE 1: Standing heel/toe raises 2 x 10  TE 2: seated hamstring stretch 3x30" B  TE 5: nustep L1 6'  TE 7: knee dangle B 7#  Manual Therapy  MT 1: Patellar mobs grade I-II  MT 2: Tibiofemoral distraction EOM  Balance/Neuromuscular Re-Education  NMR 1: SLR: 2x10 B  NMR 2: Hooklying hip abduction 2' 5" GTB  NMR 3: Hooklying hip adduction 2' 5" holds  NMR 7: quad set 2x10 B    Time Entry(in minutes):  Manual Therapy Time Entry: 10  Neuromuscular Re-Education Time Entry: 25  Therapeutic Exercise Time Entry: 20    Assessment & Plan   Assessment: Patient is a 64 y.o. F with a medical diagnosis of R53.81 (ICD-10-CM) - Physical deconditioning Z91.89 (ICD-10-CM) - Sedentary lifestyle. Patient presents with improving bilateral knee pain R>L. Continued regressed program focused on pain modulation and gentle quad and hip strengthening. Good response to tibiofemoral distraction and overall no aggravation with session today.  Monitor response. " Progress as appropriate.  Evaluation/Treatment Tolerance: Patient tolerated treatment well    Patient will continue to benefit from skilled outpatient physical therapy to address the deficits listed in the problem list box on initial evaluation, provide pt/family education and to maximize pt's level of independence in the home and community environment.     Patient's spiritual, cultural, and educational needs considered and patient agreeable to plan of care and goals.           Plan: Cont treatment per POC    Goals:   Active       long term goals       patient will improve FOTO score to 55% to improve functional mobility (Progressing)       Start:  03/11/25    Expected End:  05/09/25            patient will improve knee flexion to 105+ to improve performance of ADLs  (Progressing)       Start:  03/11/25    Expected End:  05/09/25            patient will improve lower extremity manual muscle tests by 1/2 grade to improve strength (Progressing)       Start:  03/11/25    Expected End:  05/09/25               short term goals       patient will demonstrate independence with home exercise program  (Progressing)       Start:  03/11/25    Expected End:  04/11/25            patient will improve knee range of motion by 5+ degrees to improve functional mobility (Progressing)       Start:  03/11/25    Expected End:  04/11/25            patient will be able to ambulate 3 laps with rolling walker without rest break (Progressing)       Start:  03/11/25    Expected End:  04/11/25                Radha Bond PTA

## 2025-05-02 ENCOUNTER — TELEPHONE (OUTPATIENT)
Dept: PHARMACY | Facility: CLINIC | Age: 65
End: 2025-05-02
Payer: COMMERCIAL

## 2025-05-02 NOTE — TELEPHONE ENCOUNTER
[facility]/Population Health Chart Review & Patient Outreach Details For Medication Adherence Project    Reason for Outreach Encounter: 3rd Party payor non-compliance report ([employer], [employer], C, etc)  2.  Patient Outreach Method: Reviewed Patient Chart  3.   Medication in question: losartan   LAST FILLED: 4/10/25 for 90 day supply  Hypertension Medications              furosemide (LASIX) 20 MG tablet TAKE 1 TABLET BY MOUTH EVERY DAY    losartan (COZAAR) 25 MG tablet TAKE 1/2 TABLET BY MOUTH ONCE DAILY              4.  Reviewed and or Updates Made To: Patient Chart  5. Outreach Outcomes and/or actions taken: Patient filled medication and is on track to be adherent

## 2025-05-05 ENCOUNTER — CLINICAL SUPPORT (OUTPATIENT)
Dept: REHABILITATION | Facility: HOSPITAL | Age: 65
End: 2025-05-05
Payer: COMMERCIAL

## 2025-05-05 DIAGNOSIS — R53.1 DECREASED STRENGTH, ENDURANCE, AND MOBILITY: Primary | ICD-10-CM

## 2025-05-05 DIAGNOSIS — R68.89 DECREASED STRENGTH, ENDURANCE, AND MOBILITY: Primary | ICD-10-CM

## 2025-05-05 DIAGNOSIS — M25.662 DECREASED RANGE OF MOTION (ROM) OF BOTH KNEES: ICD-10-CM

## 2025-05-05 DIAGNOSIS — Z74.09 DECREASED STRENGTH, ENDURANCE, AND MOBILITY: Primary | ICD-10-CM

## 2025-05-05 DIAGNOSIS — M25.661 DECREASED RANGE OF MOTION (ROM) OF BOTH KNEES: ICD-10-CM

## 2025-05-05 PROCEDURE — 97110 THERAPEUTIC EXERCISES: CPT | Mod: PN

## 2025-05-05 PROCEDURE — 97112 NEUROMUSCULAR REEDUCATION: CPT | Mod: PN

## 2025-05-05 NOTE — PROGRESS NOTES
"  Outpatient Rehab    Physical Therapy Visit    Patient Name: Shelley Levy  MRN: [id number]  YOB: 1960  Encounter Date: 5/5/2025    Therapy Diagnosis:   Encounter Diagnoses   Name Primary?    Decreased strength, endurance, and mobility Yes    Decreased range of motion (ROM) of both knees      Physician: Corey Hernandez MD    Physician Orders: Eval and Treat  Medical Diagnosis: Physical deconditioning  Sedentary lifestyle    Visit # / Visits Authorized:  12 / 20  Insurance Authorization Period: 3/10/2025 to 12/31/2025  Date of Evaluation: 3/10/2025   Plan of Care Certification: 3/10/2025 to 5/9/25      PT/PTA: PT   Number of PTA visits since last PT visit:2  Time In: 1400   Time Out: 1458  Total Time: 58   Total Billable Time: 58    FOTO:  Intake Score:  %  Survey Score 1:  %  Survey Score 2:  %         Subjective   pain in knees. has been going back and forth between her and husbands doctors appointments. having trouble lifting R knee. difficutly reaching to put shoe on..  Pain reported as 7/10. Left knee/physical deconditioning, R knee    Objective         Timed Up & Go (TUG)  Time: 12.24 seconds  Observations: Short strides and Slow tentative pace  An older adult who takes >=12 seconds to complete the TUG is at risk for falling.    Limited by right knee          Treatment:  Therapeutic Exercise  TE 1: Standing heel/toe raises 2 x 10- posterior knee pain  TE 2: seated hamstring stretch 3x30" B  TE 3: hamstring curls RTB 2x15  TE 4: seated marches RTB 2x10 B  TE 5: nustep L1 6'  TE 6: hamstring curls YTB 1x10  TE 7: knee dangle B 7# 4'  TE 8: knee flexion to step 10x B  Balance/Neuromuscular Re-Education  NMR 1: SLR: 2x10 B  NMR 2: Hooklying hip abduction 2' 5" GTB  NMR 3: Hooklying hip adduction 2' 5" holds  NMR 7: quad set 2x10 B    Time Entry(in minutes):  Neuromuscular Re-Education Time Entry: 28  Therapeutic Exercise Time Entry: 30    Assessment & Plan   Assessment: Patient is a 64 y.o. F with a " medical diagnosis of R53.81 (ICD-10-CM) - Physical deconditioning Z91.89 (ICD-10-CM) - Sedentary lifestyle. Patient presents with increased bilateral knee pain R>L.  minimal progressions today. increased posterior knee pain with standing heel raises.  Good response to tibiofemoral distraction.  Monitor response. Progress as appropriate.  Evaluation/Treatment Tolerance: Patient limited by pain    Patient will continue to benefit from skilled outpatient physical therapy to address the deficits listed in the problem list box on initial evaluation, provide pt/family education and to maximize pt's level of independence in the home and community environment.     Patient's spiritual, cultural, and educational needs considered and patient agreeable to plan of care and goals.           Plan: Cont treatment per POC    Goals:   Active       long term goals       patient will improve FOTO score to 55% to improve functional mobility (Progressing)       Start:  03/11/25    Expected End:  05/09/25            patient will improve knee flexion to 105+ to improve performance of ADLs  (Progressing)       Start:  03/11/25    Expected End:  05/09/25            patient will improve lower extremity manual muscle tests by 1/2 grade to improve strength (Progressing)       Start:  03/11/25    Expected End:  05/09/25               short term goals       patient will demonstrate independence with home exercise program  (Progressing)       Start:  03/11/25    Expected End:  04/11/25            patient will improve knee range of motion by 5+ degrees to improve functional mobility (Progressing)       Start:  03/11/25    Expected End:  04/11/25            patient will be able to ambulate 3 laps with rolling walker without rest break (Progressing)       Start:  03/11/25    Expected End:  04/11/25                Danna Godfrey, PT, DPT

## 2025-05-08 ENCOUNTER — CLINICAL SUPPORT (OUTPATIENT)
Dept: REHABILITATION | Facility: HOSPITAL | Age: 65
End: 2025-05-08
Payer: COMMERCIAL

## 2025-05-08 DIAGNOSIS — M25.662 DECREASED RANGE OF MOTION (ROM) OF BOTH KNEES: ICD-10-CM

## 2025-05-08 DIAGNOSIS — Z74.09 DECREASED STRENGTH, ENDURANCE, AND MOBILITY: Primary | ICD-10-CM

## 2025-05-08 DIAGNOSIS — M25.661 DECREASED RANGE OF MOTION (ROM) OF BOTH KNEES: ICD-10-CM

## 2025-05-08 DIAGNOSIS — R68.89 DECREASED STRENGTH, ENDURANCE, AND MOBILITY: Primary | ICD-10-CM

## 2025-05-08 DIAGNOSIS — R53.1 DECREASED STRENGTH, ENDURANCE, AND MOBILITY: Primary | ICD-10-CM

## 2025-05-08 PROCEDURE — 97110 THERAPEUTIC EXERCISES: CPT | Mod: PN

## 2025-05-08 PROCEDURE — 97140 MANUAL THERAPY 1/> REGIONS: CPT | Mod: PN

## 2025-05-08 PROCEDURE — 97112 NEUROMUSCULAR REEDUCATION: CPT | Mod: PN

## 2025-05-09 NOTE — PROGRESS NOTES
Outpatient Rehab    Physical Therapy Discharge    Patient Name: Shelley Levy  MRN: [id number]  YOB: 1960  Encounter Date: 5/8/2025    Therapy Diagnosis:   Encounter Diagnoses   Name Primary?    Decreased strength, endurance, and mobility Yes    Decreased range of motion (ROM) of both knees      Physician: Corey Hernandez MD    Physician Orders: Eval and Treat  Medical Diagnosis: Physical deconditioning  Sedentary lifestyle    Visit # / Visits Authorized:  13 / 20  Insurance Authorization Period: 3/10/2025 to 12/31/2025  Date of Evaluation: 3/10/25  Plan of Care Certification: 3/10/25 to 5/8/25     PT/PTA: PT   Number of PTA visits since last PT visit:0  Time In: 1405   Time Out: 1505  Total Time (in minutes): 60   Total Billable Time (in minutes): 60    FOTO:  Intake Score: 45%  Survey Score 2: 48%  Survey Score 3: 41%         Subjective   legs are hurting more today. overall continues to have a lot of knee pain and agreeable to discharge PT today with return to MD in 2 weeks..  Pain reported as 8/10. Left knee/physical deconditioning, R knee    Objective       Knee Swelling  Location of Measurement Right  (cm) Left  (cm)   20 cm Above Joint Line       10 cm Vastus Medialis Oblique       At Joint Line       15 cm Below Joint Line       +1 pitting edema              Hip Strength - Planes of Motion   Right Strength Right Pain Left Strength Left  Pain   Flexion (L2) 4+ Yes 5 Yes   Extension           ABduction 4+   4+     ADduction 5   5     Internal Rotation           External Rotation 4+ Yes 4+         Knee Strength   Right Strength Right Pain Left Strength Left  Pain   Flexion (S2) 4+   4+ Yes   Prone Flexion           Extension (L3) 4+   4+ Yes          Ankle/Foot Strength - Planes of Motion   Right Strength Right Pain Left Strength Left  Pain   Dorsiflexion (L4) 4+   4+     Plantar Flexion (S1) 5   4+     Inversion 4+   4+     Eversion 4+   4+     Great Toe Flexion           Great Toe Extension  "(L5)           Lesser Toes Flexion           Lesser Toes Extension                     Timed Up & Go (TUG)  Time: 25 seconds  Observations: Short strides and Slow tentative pace  An older adult who takes >=12 seconds to complete the TUG is at risk for falling.       Sit to Stand Testing  The patient completed 5 sit to stand transfers in 16.6 sec. use of RW, UE support                  Treatment:  Therapeutic Exercise  TE 3: hamstring curls GTB 2x10  TE 4: hooklying marches GTB 2x10 B  TE 7: knee dangle B 7# 4'  Manual Therapy  MT 2: Tibiofemoral distraction EOM  Balance/Neuromuscular Re-Education  NMR 1: SLR: 3x10 B  NMR 2: Hooklying hip abduction 2' 5" GTB    Time Entry(in minutes):  Manual Therapy Time Entry: 10  Neuromuscular Re-Education Time Entry: 15  Therapeutic Exercise Time Entry: 35    Assessment & Plan   Assessment: Patient is a 64 y.o. F with a medical diagnosis of R53.81 (ICD-10-CM) - Physical deconditioning Z91.89 (ICD-10-CM) - Sedentary lifestyle. Patient presents with increased bilateral knee pain R>L.  recently limited in progressions of weightbearing activities due to pain with knee loading. moderate varus deformities. pt demonstrates fair improvements in LE strength, but continues to have limites knee ROM and functional mobility. pt may benefit from prescription of cecilia size rollator to improve community ambulation.  Pt agreeable to discharge to [facility] today and will follow up with referring MD in 2 weeks.  Evaluation/Treatment Tolerance: Patient limited by pain    Patient's spiritual, cultural, and educational needs considered and patient agreeable to plan of care and goals.           Plan: discharge to [facility], follow up in 2 weeks with MD    Goals:   Active       long term goals       patient will improve FOTO score to 55% to improve functional mobility (Not Progressing)       Start:  03/11/25    Expected End:  05/09/25            patient will improve knee flexion to 105+ to improve performance of " ADLs  (Progressing)       Start:  03/11/25    Expected End:  05/09/25            patient will improve lower extremity manual muscle tests by 1/2 grade to improve strength (Met)       Start:  03/11/25    Expected End:  05/09/25    Resolved:  05/08/25            short term goals       patient will demonstrate independence with home exercise program  (Met)       Start:  03/11/25    Expected End:  04/11/25    Resolved:  05/08/25         patient will improve knee range of motion by 5+ degrees to improve functional mobility (Progressing)       Start:  03/11/25    Expected End:  04/11/25            patient will be able to ambulate 3 laps with rolling walker without rest break (Progressing)       Start:  03/11/25    Expected End:  04/11/25                Danna Godfrey, PT, DPT

## 2025-05-19 ENCOUNTER — OFFICE VISIT (OUTPATIENT)
Dept: ORTHOPEDICS | Facility: CLINIC | Age: 65
End: 2025-05-19
Payer: COMMERCIAL

## 2025-05-19 VITALS — WEIGHT: 181 LBS | HEIGHT: 57 IN | BODY MASS INDEX: 39.05 KG/M2

## 2025-05-19 DIAGNOSIS — M17.11 PRIMARY OSTEOARTHRITIS OF RIGHT KNEE: Primary | ICD-10-CM

## 2025-05-19 DIAGNOSIS — M17.12 PRIMARY OSTEOARTHRITIS OF LEFT KNEE: ICD-10-CM

## 2025-05-19 PROCEDURE — 4010F ACE/ARB THERAPY RXD/TAKEN: CPT | Mod: CPTII,S$GLB,, | Performed by: PHYSICIAN ASSISTANT

## 2025-05-19 PROCEDURE — 1159F MED LIST DOCD IN RCRD: CPT | Mod: CPTII,S$GLB,, | Performed by: PHYSICIAN ASSISTANT

## 2025-05-19 PROCEDURE — 99213 OFFICE O/P EST LOW 20 MIN: CPT | Mod: 25,S$GLB,, | Performed by: PHYSICIAN ASSISTANT

## 2025-05-19 PROCEDURE — 3044F HG A1C LEVEL LT 7.0%: CPT | Mod: CPTII,S$GLB,, | Performed by: PHYSICIAN ASSISTANT

## 2025-05-19 PROCEDURE — 3008F BODY MASS INDEX DOCD: CPT | Mod: CPTII,S$GLB,, | Performed by: PHYSICIAN ASSISTANT

## 2025-05-19 PROCEDURE — 99999 PR PBB SHADOW E&M-EST. PATIENT-LVL V: CPT | Mod: PBBFAC,,, | Performed by: PHYSICIAN ASSISTANT

## 2025-05-19 PROCEDURE — 20610 DRAIN/INJ JOINT/BURSA W/O US: CPT | Mod: 50,S$GLB,, | Performed by: PHYSICIAN ASSISTANT

## 2025-05-19 RX ORDER — KETOROLAC TROMETHAMINE 30 MG/ML
30 INJECTION, SOLUTION INTRAMUSCULAR; INTRAVENOUS
Status: DISCONTINUED | OUTPATIENT
Start: 2025-05-19 | End: 2025-05-19 | Stop reason: HOSPADM

## 2025-05-19 RX ADMIN — KETOROLAC TROMETHAMINE 30 MG: 30 INJECTION, SOLUTION INTRAMUSCULAR; INTRAVENOUS at 03:05

## 2025-05-19 NOTE — PROGRESS NOTES
Subjective:      Chief Complaint: Pain of the Left Knee and Pain of the Right Knee    Patient ID: Shelley Levy is a 64 y.o. female.  63yo F phmx DM2 has for follow-up bilateral knee bone-on-bone osteoarthritis.  Right worse than left.  Previous short-acting Kenalog injection provided minimal relief.  Pain is worse with ambulation.  She has difficulty with knee flexion.  She also notes swelling in her lower extremities.  She has a history of venous insufficiency.  She would like to know what her options are.        Past Medical History:   Diagnosis Date    Type 2 diabetes mellitus with neurologic complication 2023        Past Surgical History:   Procedure Laterality Date    BREAST BIOPSY      BREAST LUMPECTOMY          Current Outpatient Medications   Medication Instructions    acetaminophen-codeine 300-30mg (TYLENOL #3) 300-30 mg Tab take 1 tablet by mouth every 4 hours    amitriptyline (ELAVIL) 10 mg, Oral, Nightly    amoxicillin-clavulanate 875-125mg (AUGMENTIN) 875-125 mg per tablet 1 tablet, Oral, Every 12 hours    betamethasone dipropionate (DIPROLENE) 0.05 % ointment Topical (Top), 2 times daily, To rash on left leg    blood sugar diagnostic Strp To check BG 4 times daily, to use with insurance preferred meter    blood-glucose meter kit To check BG 4 times daily, to use with insurance preferred meter    calcipotriene (DOVONOX) 0.005 % cream Topical (Top), 2 times daily, To psoriasis    chlorhexidine (PERIDEX) 0.12 % solution SMARTSI.5 Ounce(s) By Mouth Twice Daily    clobetasoL (TEMOVATE) 0.05 % cream Topical (Top), 2 times daily, Use two weeks then take a one week break. Repeat in 2 week intervals.    clobetasoL (TEMOVATE) 0.05 % external solution Topical (Top), 2 times daily, Use two weeks then take a one week break. Repeat in 2 week intervals.    collagenase (SANTYL) ointment Topical (Top), Every other day    collagenase (SANTYL) ointment Apply topically daily    COSENTYX PEN, 2 PENS, 150 mg/mL  "PnIj Inject 300mg into the skin weekly x 5 weeks, then inject 300mg once monthly    cyanocobalamin (VITAMIN B-12) 1,000 mcg, Oral, Daily    fluticasone propionate (FLONASE) 100 mcg, Each Nostril    furosemide (LASIX) 20 mg, Oral    gabapentin (NEURONTIN) 100 mg, 3 times daily    gentamicin (GARAMYCIN) 0.1 % ointment Topical (Top), Every other day    gentamicin (GARAMYCIN) 0.1 % ointment Apply topically daily as directed    ibuprofen (ADVIL,MOTRIN) 800 mg, Every 8 hours PRN    ipratropium (ATROVENT) 21 mcg (0.03 %) nasal spray INSTILL 2 SPRAYS INTO EACH NOSTRIL 3 TIMES A DAY    ketoconazole (NIZORAL) 2 % cream Topical (Top), 2 times daily, To rash under breasts and on back    ketoconazole (NIZORAL) 2 % shampoo Topical (Top), Daily, Use in shower on rash, then rinse.    lancets Misc To check BG 4 times daily, to use with insurance preferred meter    LANTUS SOLOSTAR U-100 INSULIN 10 Units, Subcutaneous, Nightly, Keep same dose as levemir.    levocetirizine (XYZAL) 5 mg, Oral, Nightly    LIDOcaine-prilocaine (EMLA) cream Topical (Top), As needed (PRN)    losartan (COZAAR) 12.5 mg, Oral    metFORMIN (GLUCOPHAGE-XR) 1,000 mg, Oral, 2 times daily with meals    mupirocin (BACTROBAN) 2 % ointment apply topically daily as directed    pen needle, diabetic (BD PRECIOSU 2ND GEN PEN NEEDLE) 32 gauge x 5/32" Ndle Use to administer insulin under the skin one (1) time a day; discard pen needle after each use.    pravastatin (PRAVACHOL) 20 mg, Oral    prednisoLONE acetate (PRED FORTE) 1 % DrpS 1 drop, Both Eyes, 4 times daily    pregabalin (LYRICA) 100 mg, Oral, 3 times daily    secukinumab (COSENTYX UNOREADY PEN) 300 mg/2 mL PnIj Inject 1 pen (300mg) into the skin once every 28 days    semaglutide (OZEMPIC) 0.25 mg or 0.5 mg (2 mg/3 mL) pen injector Inject 0.25 mg into the skin once weekly for 4 weeks, then increase to 0.5 mg once weekly thereafter    terbinafine HCL (LAMISIL) 250 mg, Oral    TRUEPLUS LANCETS 33 gauge Misc 4 times daily " "       Review of patient's allergies indicates:  No Known Allergies    Review of Systems   Constitutional: Negative for fever and malaise/fatigue.   Eyes:  Negative for blurred vision.   Cardiovascular:  Negative for chest pain.   Respiratory:  Negative for shortness of breath.    Skin:  Negative for poor wound healing.   Musculoskeletal:  Positive for joint pain and myalgias.   Genitourinary:  Negative for bladder incontinence.   Neurological:  Negative for dizziness, numbness and paresthesias.   Psychiatric/Behavioral:  Negative for altered mental status.        The patient's relevant past medical, surgical, and social history was reviewed in Epic.       Objective:      VITAL SIGNS: Ht 4' 9" (1.448 m)   Wt 82.1 kg (181 lb)   BMI 39.17 kg/m²     General    Nursing note and vitals reviewed.  Constitutional: She is oriented to person, place, and time. She appears well-developed and well-nourished.   Neurological: She is alert and oriented to person, place, and time.     General Musculoskeletal Exam   Gait: antalgic       Right Knee Exam     Inspection   Swelling: present    Tenderness   The patient is tender to palpation of the medial joint line.    Range of Motion   Extension:  5   Flexion:  80     Tests   Ligament Examination   MCL - Valgus: normal (0 to 2mm)  LCL - Varus: normal  Patella   Passive Patellar Tilt: neutral    Other   Sensation: decreased    Left Knee Exam     Inspection   Swelling: present    Tenderness   The patient tender to palpation of the medial joint line.    Range of Motion   Extension:  5   Flexion:  90     Tests   Stability   MCL - Valgus: normal (0 to 2mm)  LCL - Varus: normal (0 to 2mm)  Patella   Passive Patellar Tilt: neutral    Other   Sensation: decreased    Muscle Strength   Right Lower Extremity   Quadriceps:  4/5   Hamstring:  3/5   Left Lower Extremity   Quadriceps:  4/5   Hamstrin/5     Vascular Exam     Right Pulses  Dorsalis Pedis:      2+  Posterior Tibial:      " 2+        Left Pulses  Dorsalis Pedis:      2+  Posterior Tibial:      2+           Imaging          Assessment:       Shelley Levy is a 64 y.o. female seen in the office today for   1. Primary osteoarthritis of right knee    2. Primary osteoarthritis of left knee     Bilateral knee bone-on-bone osteoarthritis.  Explained to the patient given her insurance, we do not have a lot of options.  I will try and see if we can get Zilretta injections approved by her insurance.  Explained to the patient if they do get approve it is usually only 1 time.  I will also have her follow up with Dr. Rosa to discuss surgical options like TKA or PNS.  Toradol injections given today for acute pain.  She will return once and if the Zilretta is improved.      Plan:       Shelley was seen today for pain and pain.    Diagnoses and all orders for this visit:    Primary osteoarthritis of right knee  -     Prior authorization Order  -     Large Joint Aspiration/Injection: bilateral knee    Primary osteoarthritis of left knee  -     Prior authorization Order  -     Large Joint Aspiration/Injection: bilateral knee          Diagnoses and plan discussed with the patient, as well as the expected course and duration of his symptoms.  All questions and concerns were addressed prior to the end of the visit.   Instructed patient to call office if they have any future questions/concerns or to schedule apt. Patient will return to see me if symptoms worsen or fail to improve    Note dictated with voice recognition software, please excuse any grammatical errors.        Ginger Sherman PA-C      Department of Orthopedic Surgery  [facility]  Office: [phone]  05/19/2025

## 2025-05-19 NOTE — PROCEDURES
Large Joint Aspiration/Injection: bilateral knee    Date/Time: 5/19/2025 3:30 PM    Performed by: Ginger Sherman PA-C  Authorized by: Ginger Sherman PA-C    Consent Done?:  Yes (Verbal)  Indications:  Arthritis  Site marked: the procedure site was marked    Timeout: prior to procedure the correct patient, procedure, and site was verified    Prep: patient was prepped and draped in usual sterile fashion      Local anesthesia used?: Yes    Local anesthetic:  Topical anesthetic    Details:  Needle Size:  22 G  Ultrasonic Guidance for needle placement?: No    Approach:  Anterolateral  Location:  Knee  Laterality:  Bilateral  Site:  Bilateral knee  Medications (Right):  30 mg ketorolac 60 mg/2 mL  Medications (Left):  30 mg ketorolac 60 mg/2 mL  Patient tolerance:  Patient tolerated the procedure well with no immediate complications

## 2025-05-20 NOTE — PROGRESS NOTES
"  Subjective:      Patient ID:  Shelley Levy is a 62 y.o. female who presents for   Chief Complaint   Patient presents with    Rash     Rash - Initial  Affected locations: back, right shoulder, right arm and abdomen  Duration: 2 months  Signs / symptoms: itching  Timing: constant and worse at night  Aggravated by: heat and scratching  Treatments tried: TAC 0.5% once at night x 1 month.    Does not go to doctor "has not been in 30 years" - here with  today.  Has rash on right arm, back, abdomen.  Gets rash under both breasts over summer - ongoing issue.    Also c/o itching all over body. Has some patches on back of neck and legs.    Review of Systems   Skin:  Positive for itching and rash.     Objective:   Physical Exam   Constitutional: She appears well-developed and well-nourished. No distress.   Neurological: She is alert and oriented to person, place, and time. She is not disoriented.   Psychiatric: She has a normal mood and affect.   Skin:   Areas Examined (abnormalities noted in diagram):   Head / Face Inspection Performed  Neck Inspection Performed  Chest / Axilla Inspection Performed  Abdomen Inspection Performed  Back Inspection Performed  RUE Inspected  LUE Inspection Performed               Diagram Legend     Erythematous scaling macule/papule c/w actinic keratosis       Vascular papule c/w angioma      Pigmented verrucoid papule/plaque c/w seborrheic keratosis      Yellow umbilicated papule c/w sebaceous hyperplasia      Irregularly shaped tan macule c/w lentigo     1-2 mm smooth white papules consistent with Milia      Movable subcutaneous cyst with punctum c/w epidermal inclusion cyst      Subcutaneous movable cyst c/w pilar cyst      Firm pink to brown papule c/w dermatofibroma      Pedunculated fleshy papule(s) c/w skin tag(s)      Evenly pigmented macule c/w junctional nevus     Mildly variegated pigmented, slightly irregular-bordered macule c/w mildly atypical nevus      Flesh colored " to evenly pigmented papule c/w intradermal nevus       Pink pearly papule/plaque c/w basal cell carcinoma      Erythematous hyperkeratotic cursted plaque c/w SCC      Surgical scar with no sign of skin cancer recurrence      Open and closed comedones      Inflammatory papules and pustules      Verrucoid papule consistent consistent with wart     Erythematous eczematous patches and plaques     Dystrophic onycholytic nail with subungual debris c/w onychomycosis     Umbilicated papule    Erythematous-base heme-crusted tan verrucoid plaque consistent with inflamed seborrheic keratosis     Erythematous Silvery Scaling Plaque c/w Psoriasis     See annotation      Assessment / Plan:        Tinea corporis   Will need systemic tx given diffuse nature  No labs in system; will check to ensure ok to take terbinafine  Reviewed need for pt to establish care with PCP for routine screenings  Will obtain A1c as well given degree of tinea - possibly could be sign of diabetes  If labwork ok will send 250 mg lamisil daily   Topicals sent today    -     CBC Auto Differential; Future; Expected date: 07/11/2023  -     Comprehensive Metabolic Panel; Future; Expected date: 07/11/2023  -     Lipid Panel; Future; Expected date: 07/11/2023  -     ketoconazole (NIZORAL) 2 % cream; Apply topically 2 (two) times daily. To rash under breasts and on back  Dispense: 120 g; Refill: 5  -     ketoconazole (NIZORAL) 2 % shampoo; Apply topically once daily. Use in shower on rash, then rinse.  Dispense: 120 mL; Refill: 5  -     HEMOGLOBIN A1C; Future; Expected date: 07/11/2023    Encounter for long-term current use of high risk medication  -     CBC Auto Differential; Future; Expected date: 07/11/2023  -     Comprehensive Metabolic Panel; Future; Expected date: 07/11/2023  -     Lipid Panel; Future; Expected date: 07/11/2023  -     ketoconazole (NIZORAL) 2 % cream; Apply topically 2 (two) times daily. To rash under breasts and on back  Dispense: 120 g;  Refill: 5  -     HEMOGLOBIN A1C; Future; Expected date: 07/11/2023    Lichen simplex chronicus  Will address at next visit               No follow-ups on file.     No

## 2025-05-21 ENCOUNTER — OFFICE VISIT (OUTPATIENT)
Dept: INTERNAL MEDICINE | Facility: CLINIC | Age: 65
End: 2025-05-21
Payer: COMMERCIAL

## 2025-05-21 VITALS
SYSTOLIC BLOOD PRESSURE: 128 MMHG | HEIGHT: 57 IN | DIASTOLIC BLOOD PRESSURE: 76 MMHG | OXYGEN SATURATION: 95 % | HEART RATE: 80 BPM | WEIGHT: 181.19 LBS | BODY MASS INDEX: 39.09 KG/M2

## 2025-05-21 DIAGNOSIS — E78.5 DYSLIPIDEMIA: ICD-10-CM

## 2025-05-21 DIAGNOSIS — E11.3313 MODERATE NONPROLIFERATIVE DIABETIC RETINOPATHY OF BOTH EYES WITH MACULAR EDEMA ASSOCIATED WITH TYPE 2 DIABETES MELLITUS: ICD-10-CM

## 2025-05-21 DIAGNOSIS — I87.2 VENOUS INSUFFICIENCY: ICD-10-CM

## 2025-05-21 DIAGNOSIS — Z79.4 TYPE 2 DIABETES MELLITUS WITH DIABETIC POLYNEUROPATHY, WITH LONG-TERM CURRENT USE OF INSULIN: ICD-10-CM

## 2025-05-21 DIAGNOSIS — E11.42 TYPE 2 DIABETES MELLITUS WITH DIABETIC POLYNEUROPATHY, WITH LONG-TERM CURRENT USE OF INSULIN: ICD-10-CM

## 2025-05-21 DIAGNOSIS — Z09 FOLLOW-UP EXAM: Primary | ICD-10-CM

## 2025-05-21 PROCEDURE — 3078F DIAST BP <80 MM HG: CPT | Mod: CPTII,S$GLB,, | Performed by: INTERNAL MEDICINE

## 2025-05-21 PROCEDURE — 99999 PR PBB SHADOW E&M-EST. PATIENT-LVL V: CPT | Mod: PBBFAC,,, | Performed by: INTERNAL MEDICINE

## 2025-05-21 PROCEDURE — 99214 OFFICE O/P EST MOD 30 MIN: CPT | Mod: S$GLB,,, | Performed by: INTERNAL MEDICINE

## 2025-05-21 PROCEDURE — 3044F HG A1C LEVEL LT 7.0%: CPT | Mod: CPTII,S$GLB,, | Performed by: INTERNAL MEDICINE

## 2025-05-21 PROCEDURE — 3008F BODY MASS INDEX DOCD: CPT | Mod: CPTII,S$GLB,, | Performed by: INTERNAL MEDICINE

## 2025-05-21 PROCEDURE — 3074F SYST BP LT 130 MM HG: CPT | Mod: CPTII,S$GLB,, | Performed by: INTERNAL MEDICINE

## 2025-05-21 PROCEDURE — 4010F ACE/ARB THERAPY RXD/TAKEN: CPT | Mod: CPTII,S$GLB,, | Performed by: INTERNAL MEDICINE

## 2025-05-23 ENCOUNTER — OFFICE VISIT (OUTPATIENT)
Dept: OPTOMETRY | Facility: CLINIC | Age: 65
End: 2025-05-23
Payer: COMMERCIAL

## 2025-05-23 DIAGNOSIS — H52.4 PRESBYOPIA OF BOTH EYES: ICD-10-CM

## 2025-05-23 DIAGNOSIS — H53.002 AMBLYOPIA EX ANOPSIA OF LEFT EYE: ICD-10-CM

## 2025-05-23 DIAGNOSIS — E11.3299 BACKGROUND DIABETIC RETINOPATHY ASSOCIATED WITH TYPE 2 DIABETES MELLITUS: ICD-10-CM

## 2025-05-23 DIAGNOSIS — E11.311 DIABETIC MACULAR EDEMA OF BOTH EYES: ICD-10-CM

## 2025-05-23 DIAGNOSIS — H04.123 DRY EYE SYNDROME OF BOTH EYES: Primary | ICD-10-CM

## 2025-05-23 DIAGNOSIS — S05.51XA: ICD-10-CM

## 2025-05-23 PROCEDURE — 4010F ACE/ARB THERAPY RXD/TAKEN: CPT | Mod: CPTII,S$GLB,, | Performed by: OPTOMETRIST

## 2025-05-23 PROCEDURE — 1160F RVW MEDS BY RX/DR IN RCRD: CPT | Mod: CPTII,S$GLB,, | Performed by: OPTOMETRIST

## 2025-05-23 PROCEDURE — 1159F MED LIST DOCD IN RCRD: CPT | Mod: CPTII,S$GLB,, | Performed by: OPTOMETRIST

## 2025-05-23 PROCEDURE — 99999 PR PBB SHADOW E&M-EST. PATIENT-LVL IV: CPT | Mod: PBBFAC,,, | Performed by: OPTOMETRIST

## 2025-05-23 PROCEDURE — 2022F DILAT RTA XM EVC RTNOPTHY: CPT | Mod: CPTII,S$GLB,, | Performed by: OPTOMETRIST

## 2025-05-23 PROCEDURE — 92014 COMPRE OPH EXAM EST PT 1/>: CPT | Mod: S$GLB,,, | Performed by: OPTOMETRIST

## 2025-05-23 PROCEDURE — 3044F HG A1C LEVEL LT 7.0%: CPT | Mod: CPTII,S$GLB,, | Performed by: OPTOMETRIST

## 2025-05-23 PROCEDURE — 92015 DETERMINE REFRACTIVE STATE: CPT | Mod: S$GLB,,, | Performed by: OPTOMETRIST

## 2025-05-24 NOTE — PROGRESS NOTES
HPI    OSCAR: 04/16/2025 Dr. Tran   Chief complaint (CC): Pt. Presents today for refraction only. Pt. States   VA is unstable with current glasses. Pt. Has blurred VA OU, ongoing since   December. Pt has black line in vision OD since last shot in April that   moves with eye.  Glasses? +  Contacts? -  H/o eye surgery, injections or laser: + Injections - Dr. Tran   H/o eye injury: -  Known eye conditions?   Moderate nonproliferative diabetic retinopathy of both eyes with macular   edema associated with type 2 diabetes mellitus      Age-related nuclear cataract of both eyes    Cortical cataract of both eyes      Amblyopia ex anopsia of left eye     Refractive errors    Family h/o eye conditions? Maternal Grandmother - Macular Degeneration  Eye gtts? -      (-) Flashes (-)  Floaters (-) Mucous   (-)  Tearing (-) Itching (-) Burning   (-) Headaches (-) Eye Pain/discomfort (-) Irritation   (-)  Redness (-) Double vision (+) Blurry vision    Diabetic? Yes  Hemoglobin A1C       Date                     Value               Ref Range             Status                02/18/2025               6.2 (H)             4.0 - 5.6 %           Final                 12/17/2024               6.1 (H)             4.0 - 5.6 %           Final                 08/23/2024               5.5                 4.0 - 5.6 %           Final                  Last edited by Jamila Hwang, OD on 5/23/2025  3:00 PM.            Assessment /Plan     For exam results, see Encounter Report.      Dry eye syndrome of both eyes  Recommend iVizia, Systane Ultra or Refresh Optive BID-TID OU to aid with symptoms of dry eyes.    Amblyopia ex anopsia of left eye  Presbyopia of both eyes  SRx released to patient. Patient educated on lens options. Normal ocular health. RTC 1 year for routine exam.     Foreign body in vitreous, right  Diabetic macular edema of both eyes  Background diabetic retinopathy associated with type 2 diabetes mellitus  Pt reports vertical  kelvin OD. Pt is seeing implant of Illuvien. Cont w/f/u w/Dr Tran.

## 2025-05-29 DIAGNOSIS — G60.3 IDIOPATHIC PROGRESSIVE NEUROPATHY: Primary | ICD-10-CM

## 2025-06-03 ENCOUNTER — OFFICE VISIT (OUTPATIENT)
Dept: ORTHOPEDICS | Facility: CLINIC | Age: 65
End: 2025-06-03
Payer: COMMERCIAL

## 2025-06-03 VITALS — WEIGHT: 181.19 LBS | HEIGHT: 57 IN | BODY MASS INDEX: 39.09 KG/M2

## 2025-06-03 DIAGNOSIS — M17.12 PRIMARY OSTEOARTHRITIS OF LEFT KNEE: ICD-10-CM

## 2025-06-03 DIAGNOSIS — M17.11 PRIMARY OSTEOARTHRITIS OF RIGHT KNEE: Primary | ICD-10-CM

## 2025-06-03 PROCEDURE — 20610 DRAIN/INJ JOINT/BURSA W/O US: CPT | Mod: 50,S$GLB,, | Performed by: PHYSICIAN ASSISTANT

## 2025-06-03 PROCEDURE — 99999 PR PBB SHADOW E&M-EST. PATIENT-LVL V: CPT | Mod: PBBFAC,,, | Performed by: PHYSICIAN ASSISTANT

## 2025-06-03 PROCEDURE — 99499 UNLISTED E&M SERVICE: CPT | Mod: S$GLB,,, | Performed by: PHYSICIAN ASSISTANT

## 2025-06-04 ENCOUNTER — OFFICE VISIT (OUTPATIENT)
Dept: DERMATOLOGY | Facility: CLINIC | Age: 65
End: 2025-06-04
Payer: COMMERCIAL

## 2025-06-04 DIAGNOSIS — L40.9 PSORIASIS: ICD-10-CM

## 2025-06-04 DIAGNOSIS — L29.9 ITCHING: ICD-10-CM

## 2025-06-04 DIAGNOSIS — Z79.899 ENCOUNTER FOR LONG-TERM CURRENT USE OF HIGH RISK MEDICATION: Primary | ICD-10-CM

## 2025-06-04 DIAGNOSIS — L82.0 INFLAMED SEBORRHEIC KERATOSIS: ICD-10-CM

## 2025-06-04 DIAGNOSIS — L30.9 DERMATITIS: ICD-10-CM

## 2025-06-04 PROCEDURE — 99999 PR PBB SHADOW E&M-EST. PATIENT-LVL IV: CPT | Mod: PBBFAC,,, | Performed by: DERMATOLOGY

## 2025-06-04 RX ORDER — BETAMETHASONE DIPROPIONATE 0.5 MG/G
CREAM TOPICAL 2 TIMES DAILY
Qty: 45 G | Refills: 5 | Status: SHIPPED | OUTPATIENT
Start: 2025-06-04

## 2025-06-04 RX ORDER — HYDROXYZINE HYDROCHLORIDE 25 MG/1
TABLET, FILM COATED ORAL
Qty: 60 TABLET | Refills: 1 | Status: SHIPPED | OUTPATIENT
Start: 2025-06-04

## 2025-06-06 RX ORDER — PREGABALIN 100 MG/1
CAPSULE ORAL
Qty: 270 CAPSULE | Refills: 3 | Status: SHIPPED | OUTPATIENT
Start: 2025-06-06

## 2025-06-08 ENCOUNTER — PATIENT MESSAGE (OUTPATIENT)
Dept: INTERNAL MEDICINE | Facility: CLINIC | Age: 65
End: 2025-06-08
Payer: COMMERCIAL

## 2025-06-12 ENCOUNTER — OFFICE VISIT (OUTPATIENT)
Dept: CARDIOLOGY | Facility: CLINIC | Age: 65
End: 2025-06-12
Payer: COMMERCIAL

## 2025-06-12 ENCOUNTER — OFFICE VISIT (OUTPATIENT)
Dept: OPHTHALMOLOGY | Facility: CLINIC | Age: 65
End: 2025-06-12
Payer: COMMERCIAL

## 2025-06-12 VITALS
DIASTOLIC BLOOD PRESSURE: 56 MMHG | BODY MASS INDEX: 36.9 KG/M2 | SYSTOLIC BLOOD PRESSURE: 100 MMHG | HEART RATE: 87 BPM | WEIGHT: 171.06 LBS | HEIGHT: 57 IN | OXYGEN SATURATION: 94 %

## 2025-06-12 DIAGNOSIS — E78.2 HYPERLIPEMIA, MIXED: ICD-10-CM

## 2025-06-12 DIAGNOSIS — H25.13 AGE-RELATED NUCLEAR CATARACT OF BOTH EYES: ICD-10-CM

## 2025-06-12 DIAGNOSIS — E66.812 CLASS 2 SEVERE OBESITY DUE TO EXCESS CALORIES WITH SERIOUS COMORBIDITY AND BODY MASS INDEX (BMI) OF 36.0 TO 36.9 IN ADULT: Primary | ICD-10-CM

## 2025-06-12 DIAGNOSIS — H43.813 VITREOUS DEGENERATION OF BOTH EYES: ICD-10-CM

## 2025-06-12 DIAGNOSIS — I87.2 VENOUS INSUFFICIENCY: ICD-10-CM

## 2025-06-12 DIAGNOSIS — E11.3313 MODERATE NONPROLIFERATIVE DIABETIC RETINOPATHY OF BOTH EYES WITH MACULAR EDEMA ASSOCIATED WITH TYPE 2 DIABETES MELLITUS: Primary | ICD-10-CM

## 2025-06-12 DIAGNOSIS — I89.0 LYMPHEDEMA: ICD-10-CM

## 2025-06-12 DIAGNOSIS — E11.311 DIABETIC MACULAR EDEMA OF BOTH EYES: ICD-10-CM

## 2025-06-12 DIAGNOSIS — H35.033 HYPERTENSIVE RETINOPATHY OF BOTH EYES: ICD-10-CM

## 2025-06-12 DIAGNOSIS — E66.01 CLASS 2 SEVERE OBESITY DUE TO EXCESS CALORIES WITH SERIOUS COMORBIDITY AND BODY MASS INDEX (BMI) OF 36.0 TO 36.9 IN ADULT: Primary | ICD-10-CM

## 2025-06-12 PROCEDURE — 2022F DILAT RTA XM EVC RTNOPTHY: CPT | Mod: CPTII,S$GLB,, | Performed by: OPHTHALMOLOGY

## 2025-06-12 PROCEDURE — 92202 OPSCPY EXTND ON/MAC DRAW: CPT | Mod: S$GLB,,, | Performed by: OPHTHALMOLOGY

## 2025-06-12 PROCEDURE — 1160F RVW MEDS BY RX/DR IN RCRD: CPT | Mod: CPTII,S$GLB,, | Performed by: OPHTHALMOLOGY

## 2025-06-12 PROCEDURE — G2211 COMPLEX E/M VISIT ADD ON: HCPCS | Mod: S$GLB,,, | Performed by: OPHTHALMOLOGY

## 2025-06-12 PROCEDURE — 99999 PR PBB SHADOW E&M-EST. PATIENT-LVL IV: CPT | Mod: PBBFAC,,, | Performed by: OPHTHALMOLOGY

## 2025-06-12 PROCEDURE — 99999 PR PBB SHADOW E&M-EST. PATIENT-LVL V: CPT | Mod: PBBFAC,,, | Performed by: INTERNAL MEDICINE

## 2025-06-12 PROCEDURE — 3044F HG A1C LEVEL LT 7.0%: CPT | Mod: CPTII,S$GLB,, | Performed by: OPHTHALMOLOGY

## 2025-06-12 PROCEDURE — 1159F MED LIST DOCD IN RCRD: CPT | Mod: CPTII,S$GLB,, | Performed by: OPHTHALMOLOGY

## 2025-06-12 PROCEDURE — 99213 OFFICE O/P EST LOW 20 MIN: CPT | Mod: S$GLB,,, | Performed by: OPHTHALMOLOGY

## 2025-06-12 PROCEDURE — 4010F ACE/ARB THERAPY RXD/TAKEN: CPT | Mod: CPTII,S$GLB,, | Performed by: OPHTHALMOLOGY

## 2025-06-12 PROCEDURE — 92134 CPTRZ OPH DX IMG PST SGM RTA: CPT | Mod: S$GLB,,, | Performed by: OPHTHALMOLOGY

## 2025-06-12 NOTE — PROGRESS NOTES
HPI    Pt is here for an 8 week DFE/OCT    Pt states no eye pain or discomfort. No flashes or floaters. Vision has   been stable. States that she has been seeing a line in her OD vision since   her last injection.  Last edited by Munir Conley MA on 6/12/2025  2:44 PM.      HPI    Pt is here for an 8 week DFE/OCT    Pt states no eye pain or discomfort. No flashes or floaters. Vision has   been stable. States that she has been seeing a line in her OD vision since   her last injection.  Last edited by Munir Conley MA on 6/12/2025  2:44 PM.         A/P    ICD-10-CM ICD-9-CM   1. 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 Vitreous degeneration of both eyes  H43.813 379.21   3. Hypertensive retinopathy of both eyes  H35.033 362.11   4. Age-related nuclear cataract of both eyes  H25.13 366.16   5. Diabetic macular edema of both eyes  E11.311 250.50     362.07     362.01       1. Moderate nonproliferative diabetic retinopathy of both eyes with macular edema associated with type 2 diabetes mellitus  2. Diabetic macular edema of both eyes  PCP  Corey Hernandez MD - Recent notes reviewed   02/18/2025  6.2  A1C     OD: s/p NEFTALY x4 2/14/25  S/p Iluvien 5/23/25  VA 20/30 (as 20/40), mod DR with IRF slowly better, IOP 17  Plan:  observe today     OS: s/p NEFTALY x4 2/14/25  S/p Iluvien 5/23/25  VA 20/200 (stable), hx of amblyopia, still with IRF but stable after Iluvien , IOP 15  Plan: observe doing well since injection, monitor      Visit today is associated with current or anticipated ongoing medical care related to this patients single serious condition/complex condition (diabetic macular edema)     Recommend good blood pressure control, tight blood glucose control, and good cholesterol control     3. Vitreous degeneration of both eyes  No RT/RD, stable     Plan: Observation      4. Hypertensive retinopathy of  both eyes  Mild HTN changes  Plan: Observation  Recommend good blood pressure control, tight blood glucose control, and good cholesterol control     5. Age-related nuclear cataract of both eyes  VS NS, Saw Jaiden  Amblyopia OS  Plan: monitor for now       RTC 4 mo DFE/OCTm OU , poss Avastin OU       I saw and examined the patient and reviewed in detail the findings documented. The final examination findings, image interpretations which have been independently interpreted, and plan as documented in the record represent my personal judgment and conclusions.    Amari Tran MD  Vitreoretinal Surgery   [facility]

## 2025-06-12 NOTE — PROGRESS NOTES
[facility]    CC: No chief complaint on file.      Patient ID: Shelley Levy is a 64 y.o. female with DM, Venous insufficiency, OA bilateral knee,  HTN and Obesity comes for initial appt. Pertinent history events     Referred By Dr. Hernandez for Venous Insufficiency    HPI  Today Mrs. Levy presents with leg swelling. She reports her leg swelling started a year ago. She reports cellulitis of her right ankle in the past a year ago as well. She has not been wearing compression stocking. The swelling has gotten better since a year ago.     Past Medical History:   Diagnosis Date    Type 2 diabetes mellitus with neurologic complication 9/5/2023     Past Surgical History:   Procedure Laterality Date    BREAST BIOPSY      BREAST LUMPECTOMY       Social History[1]  Family History   Problem Relation Name Age of Onset    Breast cancer Mother      Ovarian cancer Mother      No Known Problems Father      No Known Problems Sister      No Known Problems Maternal Aunt      No Known Problems Maternal Uncle      No Known Problems Paternal Aunt      No Known Problems Paternal Uncle      Macular degeneration Maternal Grandmother      No Known Problems Maternal Grandfather      No Known Problems Paternal Grandmother      No Known Problems Paternal Grandfather      No Known Problems Brother      Amblyopia Neg Hx      Blindness Neg Hx      Cancer Neg Hx      Cataracts Neg Hx      Diabetes Neg Hx      Glaucoma Neg Hx      Hypertension Neg Hx      Retinal detachment Neg Hx      Strabismus Neg Hx      Stroke Neg Hx      Thyroid disease Neg Hx         Review of patient's allergies indicates:  No Known Allergies    Medication List with Changes/Refills   Current Medications    ACETAMINOPHEN-CODEINE 300-30MG (TYLENOL #3) 300-30 MG TAB    take 1 tablet by mouth every 4 hours    AMITRIPTYLINE (ELAVIL) 10 MG TABLET    TAKE 1 TABLET BY MOUTH NIGHTLY AS NEEDED FOR INSOMNIA.    AMOXICILLIN-CLAVULANATE 875-125MG (AUGMENTIN) 875-125 MG  PER TABLET    Take 1 tablet by mouth every 12 (twelve) hours.    BETAMETHASONE DIPROPIONATE (DIPROLENE) 0.05 % OINTMENT    Apply topically 2 (two) times daily. To rash on left leg    BETAMETHASONE DIPROPIONATE 0.05 % CREAM    Apply topically 2 (two) times daily. To AA for rash    BLOOD SUGAR DIAGNOSTIC STRP    To check BG 4 times daily, to use with insurance preferred meter    BLOOD-GLUCOSE METER KIT    To check BG 4 times daily, to use with insurance preferred meter    CALCIPOTRIENE (DOVONOX) 0.005 % CREAM    Apply topically 2 (two) times daily. To psoriasis    CHLORHEXIDINE (PERIDEX) 0.12 % SOLUTION    SMARTSI.5 Ounce(s) By Mouth Twice Daily    CLOBETASOL (TEMOVATE) 0.05 % CREAM    Apply topically 2 (two) times daily. Use two weeks then take a one week break. Repeat in 2 week intervals.    CLOBETASOL (TEMOVATE) 0.05 % EXTERNAL SOLUTION    Apply topically 2 (two) times daily. Use two weeks then take a one week break. Repeat in 2 week intervals.    COLLAGENASE (SANTYL) OINTMENT    Apply topically every other day.    COLLAGENASE (SANTYL) OINTMENT    Apply topically daily    COSENTYX PEN, 2 PENS, 150 MG/ML PNIJ    Inject 300mg into the skin weekly x 5 weeks, then inject 300mg once monthly    CYANOCOBALAMIN (VITAMIN B-12) 1000 MCG TABLET    Take 1 tablet (1,000 mcg total) by mouth once daily.    FLUTICASONE PROPIONATE (FLONASE) 50 MCG/ACTUATION NASAL SPRAY    SPRAY 2 SPRAYS BY EACH NOSTRIL ROUTE ONCE DAILY.    FUROSEMIDE (LASIX) 20 MG TABLET    TAKE 1 TABLET BY MOUTH EVERY DAY    GABAPENTIN (NEURONTIN) 100 MG CAPSULE    Take 100 mg by mouth 3 (three) times daily.    GENTAMICIN (GARAMYCIN) 0.1 % OINTMENT    Apply topically every other day.    GENTAMICIN (GARAMYCIN) 0.1 % OINTMENT    Apply topically daily as directed    HYDROXYZINE HCL (ATARAX) 25 MG TABLET    Take 1-2 tabs qhs prn severe itching    IBUPROFEN (ADVIL,MOTRIN) 800 MG TABLET    Take 800 mg by mouth every 8 (eight) hours as needed.    INSULIN GLARGINE  "U-100, LANTUS, (LANTUS SOLOSTAR U-100 INSULIN) 100 UNIT/ML (3 ML) INPN PEN    Inject 10 Units into the skin every evening. Keep same dose as levemir.    IPRATROPIUM (ATROVENT) 21 MCG (0.03 %) NASAL SPRAY    INSTILL 2 SPRAYS INTO EACH NOSTRIL 3 TIMES A DAY    KETOCONAZOLE (NIZORAL) 2 % CREAM    APPLY TOPICALLY 2 (TWO) TIMES DAILY. TO RASH UNDER BREASTS AND ON BACK    KETOCONAZOLE (NIZORAL) 2 % SHAMPOO    Apply topically once daily. Use in shower on rash, then rinse.    LANCETS MISC    To check BG 4 times daily, to use with insurance preferred meter    LEVOCETIRIZINE (XYZAL) 5 MG TABLET    Take 1 tablet (5 mg total) by mouth every evening.    LIDOCAINE-PRILOCAINE (EMLA) CREAM    Apply topically as needed.    LOSARTAN (COZAAR) 25 MG TABLET    TAKE 1/2 TABLET BY MOUTH ONCE DAILY    METFORMIN (GLUCOPHAGE-XR) 500 MG ER 24HR TABLET    TAKE 2 TABLETS BY MOUTH TWICE A DAY WITH MEALS    MUPIROCIN (BACTROBAN) 2 % OINTMENT    apply topically daily as directed    PEN NEEDLE, DIABETIC (BD PRECIOUS 2ND GEN PEN NEEDLE) 32 GAUGE X 5/32" NDLE    Use to administer insulin under the skin one (1) time a day; discard pen needle after each use.    PRAVASTATIN (PRAVACHOL) 20 MG TABLET    TAKE 1 TABLET BY MOUTH EVERY DAY    PREDNISOLONE ACETATE (PRED FORTE) 1 % DRPS    Place 1 drop into both eyes 4 (four) times daily.    PREGABALIN (LYRICA) 100 MG CAPSULE    TAKE 1 CAPSULE (100 MG TOTAL) BY MOUTH THREE TIMES DAILY.    SECUKINUMAB (COSENTYX UNOREADY PEN) 300 MG/2 ML PNIJ    Inject 1 pen (300mg) into the skin once every 28 days    SEMAGLUTIDE (OZEMPIC) 0.25 MG OR 0.5 MG (2 MG/3 ML) PEN INJECTOR    Inject 0.25 mg into the skin once weekly for 4 weeks, then increase to 0.5 mg once weekly thereafter    TERBINAFINE HCL (LAMISIL) 250 MG TABLET    TAKE 1 TABLET BY MOUTH EVERY DAY    TRUEPLUS LANCETS 33 GAUGE MISC    Apply topically 4 (four) times daily.       Review of Systems  Constitution: Denies chills, fever, and sweats.  HENT: Denies headaches or " blurry vision.  Cardiovascular: Denies chest pain or irregular heart beat.  Respiratory: Denies cough or shortness of breath.  Gastrointestinal: Denies abdominal pain, nausea, or vomiting.  Musculoskeletal: Denies muscle cramps.  Neurological: Denies dizziness or focal weakness.  Psychiatric/Behavioral: Normal mental status.  Hematologic/Lymphatic: Denies bleeding problem or easy bruising/bleeding.  Skin: Denies rash or suspicious lesions    Physical Examination  There were no vitals taken for this visit.    Constitutional: No acute distress, conversant  HEENT: Sclera anicteric, Pupils equal, round and reactive to light, extraocular motions intact, Oropharynx clear  Neck: No JVD, no carotid bruits  Cardiovascular: regular rate and rhythm, no murmur, rubs or gallops, normal S1/S2  Pulmonary: Clear to auscultation bilaterally  Abdominal: Abdomen soft, nontender, nondistended, positive bowel sounds  Extremities: No lower extremity edema,   Pulses:  Carotid pulses are 2+ on the right side, and 2+ on the left side.  Radial pulses are 2+ on the right side, and 2+ on the left side.   Femoral pulses are 2+ on the right side, and 2+ on the left side.  Popliteal pulses are 2+ on the right side, and 2+ on the left side.   Dorsalis pedis pulses are 2+ on the right side, and 2+ on the left side.   Posterior tibial pulses are 2+ on the right side, and 2+ on the left side.    Skin: No ecchymosis, erythema, or ulcers  Psych: Alert and oriented x 3, appropriate affect  Neuro: CNII-XII intact, no focal deficits    Labs:  Most Recent Data  CBC:   Lab Results   Component Value Date    WBC 7.99 12/17/2024    HGB 11.0 (L) 12/17/2024    HCT 35.9 (L) 12/17/2024     12/17/2024    MCV 93 12/17/2024    RDW 13.5 12/17/2024     BMP:   Lab Results   Component Value Date     12/17/2024    K 4.7 12/17/2024     12/17/2024    CO2 24 12/17/2024    BUN 17 12/17/2024    CREATININE 0.8 12/17/2024     (H) 12/17/2024    CALCIUM  "8.9 12/17/2024     LFTS;   Lab Results   Component Value Date    PROT 6.8 12/17/2024    ALBUMIN 3.6 12/17/2024    BILITOT 0.4 12/17/2024    AST 15 12/17/2024    ALKPHOS 88 12/17/2024    ALT 21 12/17/2024     COAGS: No results found for: "INR", "PROTIME", "PTT"  FLP:   Lab Results   Component Value Date    CHOL 160 12/17/2024    HDL 37 (L) 12/17/2024    LDLCALC 91.8 12/17/2024    TRIG 156 (H) 12/17/2024    CHOLHDL 23.1 12/17/2024     CARDIAC: No results found for: "TROPONINI", "CKTOTAL", "CKMB", "BNP"    Imaging:    LE arterial US: 4/18/2024 No hemodynamically significant stenosis.     LE Venous US 4/18/2024: No evidence of deep venous thrombosis in either lower extremity.  Left popliteal fossa cyst measuring 4.0 cm.    Assessment/Plan:    LE Edema with Lymphedema and likely Venous insufficiency: Patient is taking Lasiks 20 mg PRN for the swelling. Obtain Venous Reflux Study and WILLEM study. Arterial US revealed no significant stenosis. Elevate legs when resting. Limit salt intake <2000 mg/day, Compression Stockings recommended.     2. HLD- Continue Medications. LDL 91 on 12/2024. The 10-year ASCVD risk score (Emil DK, et al., 2019) is: 5.2%    Follow up in 3 months     Total duration of face to face visit time 30 minutes.  Total time spent counseling greater than fifty percent of total visit time.  Counseling included discussion regarding imaging findings, diagnosis, possibilities, treatment options, risks and benefits.  The patient had many questions regarding the options and long-term effects.    Patient seen and discussed with Dr. Pickard. Further recommendations per the attending addendum.    Stiven Guerrero MD  PGY IV - Vascular Medicine Fellow   [facility]             [1]   Social History  Socioeconomic History    Marital status:    Tobacco Use    Smoking status: Never    Smokeless tobacco: Never   Substance and Sexual Activity    Alcohol use: No    Drug use: Never    Sexual activity: Not " Currently     Partners: Female     Social Drivers of Health     Financial Resource Strain: Low Risk  (3/7/2024)    Overall Financial Resource Strain (CARDIA)     Difficulty of Paying Living Expenses: Not hard at all   Food Insecurity: No Food Insecurity (3/7/2024)    Hunger Vital Sign     Worried About Running Out of Food in the Last Year: Never true     Ran Out of Food in the Last Year: Never true   Transportation Needs: No Transportation Needs (3/7/2024)    PRAPARE - Transportation     Lack of Transportation (Medical): No     Lack of Transportation (Non-Medical): No   Physical Activity: Unknown (3/7/2024)    Exercise Vital Sign     Days of Exercise per Week: 4 days   Stress: No Stress Concern Present (3/7/2024)    [employer] - Occupational Stress Questionnaire     Feeling of Stress : Only a little   Housing Stability: Unknown (3/7/2024)    Housing Stability Vital Sign     Unable to Pay for Housing in the Last Year: No     Unstable Housing in the Last Year: No

## 2025-06-16 ENCOUNTER — RESULTS FOLLOW-UP (OUTPATIENT)
Dept: DERMATOLOGY | Facility: CLINIC | Age: 65
End: 2025-06-16

## 2025-06-17 ENCOUNTER — CLINICAL SUPPORT (OUTPATIENT)
Dept: DERMATOLOGY | Facility: CLINIC | Age: 65
End: 2025-06-17
Payer: COMMERCIAL

## 2025-06-17 DIAGNOSIS — Z48.02 VISIT FOR SUTURE REMOVAL: Primary | ICD-10-CM

## 2025-06-17 PROCEDURE — 99024 POSTOP FOLLOW-UP VISIT: CPT | Mod: S$GLB,,, | Performed by: DERMATOLOGY

## 2025-06-17 NOTE — PROGRESS NOTES
Suture Removal note:  CC: 64 y.o. female patient is here for suture removal.         HPI: Patient is s/p punch bx of R shoulder on 06/4/2025.  Patient reports no problems.    WOUND PE:  Sutures intact.  Wound healing well.  Good approximation of skin edges.  No signs or symptoms of infection.    IMPRESSION:  Skin, right shoulder, punch biopsy: -NUMMULAR DERMATITIS,   see comment COMMENT: Clinical images were reviewed in epic and differential diagnosis noted. Thehistological findings (see microscopic description) including spongiosis, serum deposition, and inflammatoryinfiltratewith some scattered neutrophils and eosinophils suggests nummular dermatitis. Classical histological features of psoriasis are not appreciated in the sections examined. PAS stain is negativefor fungal organisms. Correlation is recommended.     PLAN:  Sutures removed today.  Continue wound care.    RTC: In 6 months.

## 2025-06-19 ENCOUNTER — PATIENT OUTREACH (OUTPATIENT)
Dept: ADMINISTRATIVE | Facility: HOSPITAL | Age: 65
End: 2025-06-19
Payer: COMMERCIAL

## 2025-06-19 DIAGNOSIS — M17.11 PRIMARY OSTEOARTHRITIS OF RIGHT KNEE: ICD-10-CM

## 2025-06-19 DIAGNOSIS — M17.12 PRIMARY OSTEOARTHRITIS OF LEFT KNEE: Primary | ICD-10-CM

## 2025-06-19 NOTE — PROGRESS NOTES
Sent portal message to Pt regarding colorectal cancer screening.    Chart reviewed, immunizations reconciled, Care Everywhere updated.  Yves Basilio LPN  Care Coordinator  [facility]

## 2025-06-25 ENCOUNTER — OFFICE VISIT (OUTPATIENT)
Dept: PODIATRY | Facility: CLINIC | Age: 65
End: 2025-06-25
Payer: COMMERCIAL

## 2025-06-25 VITALS
BODY MASS INDEX: 37.02 KG/M2 | SYSTOLIC BLOOD PRESSURE: 120 MMHG | HEIGHT: 57 IN | HEART RATE: 83 BPM | DIASTOLIC BLOOD PRESSURE: 64 MMHG

## 2025-06-25 DIAGNOSIS — L84 CORN OR CALLUS: ICD-10-CM

## 2025-06-25 DIAGNOSIS — G57.53 TARSAL TUNNEL SYNDROME OF BOTH LOWER EXTREMITIES: ICD-10-CM

## 2025-06-25 DIAGNOSIS — M79.672 PAIN IN BOTH FEET: ICD-10-CM

## 2025-06-25 DIAGNOSIS — E11.65 TYPE 2 DIABETES MELLITUS WITH HYPERGLYCEMIA, WITHOUT LONG-TERM CURRENT USE OF INSULIN: Primary | ICD-10-CM

## 2025-06-25 DIAGNOSIS — M79.671 PAIN IN BOTH FEET: ICD-10-CM

## 2025-06-25 DIAGNOSIS — B35.1 ONYCHOMYCOSIS: ICD-10-CM

## 2025-06-25 PROCEDURE — 99999 PR PBB SHADOW E&M-EST. PATIENT-LVL V: CPT | Mod: PBBFAC,,, | Performed by: PODIATRIST

## 2025-06-25 RX ORDER — DULOXETIN HYDROCHLORIDE 30 MG/1
30 CAPSULE, DELAYED RELEASE ORAL DAILY
Qty: 30 CAPSULE | Refills: 3 | Status: SHIPPED | OUTPATIENT
Start: 2025-06-25 | End: 2026-06-25

## 2025-06-25 NOTE — PATIENT INSTRUCTIONS
Assessment/Plan:     LE Edema with Lymphedema and likely Venous insufficiency: Patient is taking Lasiks 20 mg PRN for the swelling. Obtain Venous Reflux Study and WILLEM study. Arterial US revealed no significant stenosis. Elevate legs when resting. Limit salt intake <2000 mg/day, Compression Stockings recommended.      2. HLD- Continue Medications. LDL 91 on 12/2024. The 10-year ASCVD risk score (Emil OLIVIER, et al., 2019) is: 5.2%     Follow up in 3 months

## 2025-06-26 DIAGNOSIS — L40.9 PSORIASIS: ICD-10-CM

## 2025-06-26 DIAGNOSIS — L30.9 DERMATITIS: ICD-10-CM

## 2025-06-26 NOTE — PROGRESS NOTES
Subjective:       Patient ID: Shelley Levy is a 64 y.o. female.    Chief Complaint: Follow-up      HPI  Shelley Levy is a 64 y.o. year old female with type 2 diabetes, diabetic polyneuropathy, osteoarthritis of bilateral knees, diabetic retinopathy presents for follow-up.  Patient complains today of worsening bilateral lower extremity edema.    Review of Systems   Constitutional:  Negative for activity change, appetite change, fatigue, fever and unexpected weight change.   HENT:  Negative for congestion, hearing loss, postnasal drip, sneezing, sore throat, trouble swallowing and voice change.    Eyes:  Negative for pain and discharge.   Respiratory:  Negative for cough, choking, chest tightness, shortness of breath and wheezing.    Cardiovascular:  Positive for leg swelling. Negative for chest pain and palpitations.   Gastrointestinal:  Negative for abdominal distention, abdominal pain, blood in stool, constipation, diarrhea, nausea and vomiting.   Endocrine: Negative for polydipsia and polyuria.   Genitourinary:  Negative for difficulty urinating and flank pain.   Musculoskeletal:  Negative for arthralgias, back pain, joint swelling, myalgias and neck pain.   Skin:  Negative for rash.   Neurological:  Negative for dizziness, tremors, seizures, weakness, numbness and headaches.   Psychiatric/Behavioral:  Negative for agitation. The patient is not nervous/anxious.          Past Medical History:   Diagnosis Date    Type 2 diabetes mellitus with neurologic complication 9/5/2023        Prior to Admission medications    Medication Sig Start Date End Date Taking? Authorizing Provider   acetaminophen-codeine 300-30mg (TYLENOL #3) 300-30 mg Tab take 1 tablet by mouth every 4 hours 5/15/24  Yes    amitriptyline (ELAVIL) 10 MG tablet TAKE 1 TABLET BY MOUTH NIGHTLY AS NEEDED FOR INSOMNIA. 5/20/24  Yes Yossi Prado DPM   betamethasone dipropionate (DIPROLENE) 0.05 % ointment Apply topically 2 (two) times  daily. To rash on left leg 24  Yes Ruth Moody MD   blood sugar diagnostic Strp To check BG 4 times daily, to use with insurance preferred meter 23  Yes Corey Hernandez MD   chlorhexidine (PERIDEX) 0.12 % solution SMARTSI.5 Ounce(s) By Mouth Twice Daily 10/3/24  Yes Provider, Historical   clobetasoL (TEMOVATE) 0.05 % cream Apply topically 2 (two) times daily. Use two weeks then take a one week break. Repeat in 2 week intervals. 23  Yes Joyce Adamson MD   clobetasoL (TEMOVATE) 0.05 % external solution Apply topically 2 (two) times daily. Use two weeks then take a one week break. Repeat in 2 week intervals. 23  Yes Joyce Adamson MD   collagenase (SANTYL) ointment Apply topically every other day. 24  Yes    collagenase (SANTYL) ointment Apply topically daily 5/15/24  Yes    COSENTYX PEN, 2 PENS, 150 mg/mL PnIj Inject 300mg into the skin weekly x 5 weeks, then inject 300mg once monthly 6/10/24  Yes Ruth Moody MD   cyanocobalamin (VITAMIN B-12) 1000 MCG tablet Take 1 tablet (1,000 mcg total) by mouth once daily. 23  Yes Corey Hernandez MD   fluticasone propionate (FLONASE) 50 mcg/actuation nasal spray SPRAY 2 SPRAYS BY EACH NOSTRIL ROUTE ONCE DAILY. 24  Yes Corey Hernandez MD   furosemide (LASIX) 20 MG tablet TAKE 1 TABLET BY MOUTH EVERY DAY 25  Yes Corey Hernandez MD   gabapentin (NEURONTIN) 100 MG capsule Take 100 mg by mouth 3 (three) times daily. 24  Yes Provider, Historical   gentamicin (GARAMYCIN) 0.1 % ointment Apply topically every other day. 24  Yes    gentamicin (GARAMYCIN) 0.1 % ointment Apply topically daily as directed 5/15/24  Yes    ibuprofen (ADVIL,MOTRIN) 800 MG tablet Take 800 mg by mouth every 8 (eight) hours as needed. 10/3/24  Yes Provider, Historical   insulin glargine U-100, Lantus, (LANTUS SOLOSTAR U-100 INSULIN) 100 unit/mL (3 mL) InPn pen Inject 10 Units into the skin every evening. Keep same dose as levemir. 24 Yes Corey Hernandez MD  "  ipratropium (ATROVENT) 21 mcg (0.03 %) nasal spray INSTILL 2 SPRAYS INTO EACH NOSTRIL 3 TIMES A DAY 4/4/24  Yes Corey Hernandez MD   ketoconazole (NIZORAL) 2 % cream APPLY TOPICALLY 2 (TWO) TIMES DAILY. TO RASH UNDER BREASTS AND ON BACK 7/19/24  Yes Ruth Moody MD   ketoconazole (NIZORAL) 2 % shampoo Apply topically once daily. Use in shower on rash, then rinse. 7/11/23  Yes Ruth Moody MD   lancets Misc To check BG 4 times daily, to use with insurance preferred meter 9/25/23  Yes Corey Hernandez MD   levocetirizine (XYZAL) 5 MG tablet Take 1 tablet (5 mg total) by mouth every evening. 8/16/24 8/16/25 Yes Marianna Lira PA-C   LIDOcaine-prilocaine (EMLA) cream Apply topically as needed. 2/22/24  Yes Yossi Prado DPM   losartan (COZAAR) 25 MG tablet TAKE 1/2 TABLET BY MOUTH ONCE DAILY 10/10/24  Yes Corey Hernandez MD   metFORMIN (GLUCOPHAGE-XR) 500 MG ER 24hr tablet TAKE 2 TABLETS BY MOUTH TWICE A DAY WITH MEALS 10/11/24  Yes Corey Hernandez MD   mupirocin (BACTROBAN) 2 % ointment apply topically daily as directed 5/15/24  Yes    pen needle, diabetic (BD PRECIOUS 2ND GEN PEN NEEDLE) 32 gauge x 5/32" Ndle Use to administer insulin under the skin one (1) time a day; discard pen needle after each use. 10/18/24  Yes Corey Hernandez MD   pravastatin (PRAVACHOL) 20 MG tablet TAKE 1 TABLET BY MOUTH EVERY DAY 2/5/25  Yes Corey Hernandez MD   prednisoLONE acetate (PRED FORTE) 1 % DrpS Place 1 drop into both eyes 4 (four) times daily. 2/14/25 2/14/26 Yes Amari Tran MD   secukinumab (COSENTYX UNOREADY PEN) 300 mg/2 mL PnIj Inject 1 pen (300mg) into the skin once every 28 days 12/30/24  Yes Ruth Moody MD   semaglutide (OZEMPIC) 0.25 mg or 0.5 mg (2 mg/3 mL) pen injector Inject 0.25 mg into the skin once weekly for 4 weeks, then increase to 0.5 mg once weekly thereafter 2/18/25  Yes Corey Hernandez MD   terbinafine HCL (LAMISIL) 250 mg tablet TAKE 1 TABLET BY MOUTH EVERY DAY 11/23/23  Yes Yossi Prado DPM   TRUEPLUS LANCETS " "33 gauge Misc Apply topically 4 (four) times daily. 7/25/23  Yes Provider, Historical   amoxicillin-clavulanate 875-125mg (AUGMENTIN) 875-125 mg per tablet Take 1 tablet by mouth every 12 (twelve) hours. 3/17/25   Corey Hernandez MD   betamethasone dipropionate 0.05 % cream Apply topically 2 (two) times daily. To AA for rash 6/4/25   Ruth Moody MD   blood-glucose meter kit To check BG 4 times daily, to use with insurance preferred meter 7/25/23 2/14/25  Corey Hernandez MD   calcipotriene (DOVONOX) 0.005 % cream Apply topically 2 (two) times daily. To psoriasis 11/1/23 2/14/25  Ruth Moody MD   DULoxetine (CYMBALTA) 30 MG capsule Take 1 capsule (30 mg total) by mouth once daily. 6/25/25 6/25/26  Yossi Prado DPM   hydrOXYzine HCL (ATARAX) 25 MG tablet Take 1-2 tabs qhs prn severe itching 6/4/25   Ruth Moody MD   pregabalin (LYRICA) 100 MG capsule TAKE 1 CAPSULE (100 MG TOTAL) BY MOUTH THREE TIMES DAILY. 6/6/25   Adan Richardson MD        Past medical history, surgical history, and family medical history reviewed and updated as appropriate.    Medications and allergies reviewed.     Objective:          Vitals:    05/21/25 1357   BP: 128/76   BP Location: Right arm   Patient Position: Sitting   Pulse: 80   SpO2: 95%   Weight: 82.2 kg (181 lb 3.5 oz)   Height: 4' 9" (1.448 m)     Body mass index is 39.22 kg/m².  Physical Exam  Constitutional:       Appearance: She is well-developed.   HENT:      Head: Normocephalic and atraumatic.   Eyes:      Extraocular Movements: Extraocular movements intact.   Cardiovascular:      Rate and Rhythm: Normal rate and regular rhythm.      Heart sounds: Normal heart sounds.   Pulmonary:      Effort: Pulmonary effort is normal. No respiratory distress.      Breath sounds: Normal breath sounds. No wheezing.   Abdominal:      General: Bowel sounds are normal. There is no distension.      Palpations: Abdomen is soft.      Tenderness: There is no abdominal tenderness. "   Musculoskeletal:         General: No tenderness. Normal range of motion.      Cervical back: Normal range of motion.      Right lower leg: Edema present.      Left lower leg: Edema present.   Skin:     General: Skin is warm and dry.   Neurological:      Mental Status: She is alert and oriented to person, place, and time.      Cranial Nerves: No cranial nerve deficit.      Deep Tendon Reflexes: Reflexes are normal and symmetric.         Lab Results   Component Value Date    WBC 7.99 12/17/2024    HGB 11.0 (L) 12/17/2024    HCT 35.9 (L) 12/17/2024     12/17/2024    CHOL 160 12/17/2024    TRIG 156 (H) 12/17/2024    HDL 37 (L) 12/17/2024    ALT 21 12/17/2024    AST 15 12/17/2024     12/17/2024    K 4.7 12/17/2024     12/17/2024    CREATININE 0.8 12/17/2024    BUN 17 12/17/2024    CO2 24 12/17/2024    TSH 2.111 12/17/2024    HGBA1C 6.2 (H) 02/18/2025       Assessment:       1. Follow-up exam    2. Dyslipidemia    3. Type 2 diabetes mellitus with diabetic polyneuropathy, with long-term current use of insulin    4. Venous insufficiency    5. Moderate nonproliferative diabetic retinopathy of both eyes with macular edema associated with type 2 diabetes mellitus          Plan:     Shelley was seen today for follow-up.    Diagnoses and all orders for this visit:    Follow-up exam    Dyslipidemia  Comments:  Pravastatin.  LDL goal less than 70.  Patient to monitor diet more carefully.    Type 2 diabetes mellitus with diabetic polyneuropathy, with long-term current use of insulin  Comments:  last a1c 6.2, controlled, no changes to current management.  On losartan 25.    Venous insufficiency  Comments:  Takes furosemide as needed.  Instructed on wearing compression stockings but she does not like wearing them.  We will refer to vascular  Orders:  -     Ambulatory referral/consult to Vascular Cardiology; Future    Moderate nonproliferative diabetic retinopathy of both eyes with macular edema associated with type  2 diabetes mellitus  Comments:  Follows Ophthalmology / retina clinic.      Follow up in about 3 months (around 8/21/2025).    Corey Hernandez MD  Internal Medicine / Primary Care  [facility]  5/21/2025

## 2025-06-27 ENCOUNTER — PATIENT MESSAGE (OUTPATIENT)
Dept: DERMATOLOGY | Facility: CLINIC | Age: 65
End: 2025-06-27
Payer: COMMERCIAL

## 2025-06-27 RX ORDER — HYDROXYZINE HYDROCHLORIDE 25 MG/1
TABLET, FILM COATED ORAL
Qty: 180 TABLET | Refills: 1 | OUTPATIENT
Start: 2025-06-27

## 2025-06-27 NOTE — PROGRESS NOTES
Subjective:      Patient ID: Shelley Levy is a 64 y.o. female.    Chief Complaint:   Foot Pain (Bl numbness and burning to bl foot), Ankle Pain (Right ankle have numbness), Diabetes Mellitus (Corey Hernandez MD 05/21/2025), and Nail Care (Bl nail care)    Shelley is a 64 y.o. female who presents to the clinic complaining of thick and discolored toenails on both feet. Shelley is inquiring about treatment options.  Addition issues with neuropathy symptoms.  Here for routine diabetic foot evaluation and foot care as well as issues with bilateral lower extremity edema.  Ongoing burning to the bottoms of both feet, would like to discuss injection therapy today    Review of Systems   Constitutional: Negative for chills, decreased appetite, fever and malaise/fatigue.   HENT:  Negative for congestion, hearing loss, nosebleeds and tinnitus.    Eyes:  Negative for double vision, pain, photophobia and visual disturbance.   Cardiovascular:  Negative for chest pain, claudication, cyanosis and leg swelling.   Respiratory:  Negative for cough, hemoptysis, shortness of breath and wheezing.    Endocrine: Negative for cold intolerance and heat intolerance.   Hematologic/Lymphatic: Negative for adenopathy and bleeding problem.   Skin:  Positive for color change and nail changes. Negative for dry skin, itching and suspicious lesions.   Musculoskeletal:  Positive for arthritis. Negative for joint pain, myalgias and stiffness.   Gastrointestinal:  Negative for abdominal pain, jaundice, nausea and vomiting.   Genitourinary:  Negative for dysuria, frequency and hematuria.   Neurological:  Negative for difficulty with concentration, loss of balance, numbness, paresthesias and sensory change.   Psychiatric/Behavioral:  Negative for altered mental status, hallucinations and suicidal ideas. The patient is not nervous/anxious.    Allergic/Immunologic: Negative for environmental allergies and persistent infections.           Objective:       Physical Exam  Vitals reviewed.   Constitutional:       Appearance: She is well-developed.   HENT:      Head: Normocephalic and atraumatic.   Cardiovascular:      Pulses:           Dorsalis pedis pulses are 2+ on the right side and 2+ on the left side.        Posterior tibial pulses are 2+ on the right side and 2+ on the left side.   Pulmonary:      Effort: Pulmonary effort is normal.   Musculoskeletal:         General: Normal range of motion.      Comments: Inspection and palpation of the muscles joints and bones of both lower extremities reveal that muscle strength for the anterior lateral and posterior muscle groups and intrinsic muscle groups of the foot are all 5 over 5 symmetrical.  Ankle subtalar midtarsal and digital joint range of motion are within normal limits, nonpainful, without crepitus or effusion.  Patient exhibits a normal angle and base of gait.  Palpation of the tendons reveal no defects.   Skin:     General: Skin is warm and dry.      Capillary Refill: Capillary refill takes 2 to 3 seconds.      Comments: Skin turgor is normal bilaterally.  Skin texture is well hydrated to both lower extremities.    Bilateral hyperkeratotic lesions noted sub 1st MPJ.    Thickened, discolored  toenails 1-5 with subungual debris  Partial-thickness wound/excoriation right medial ankle without deep infection/purulence noted.  No subcutaneous tissue involvement noted.   Neurological:      Mental Status: She is alert and oriented to person, place, and time.      Comments: Sharp dull light touch vibratory proprioceptive sensation are intact bilaterally.  Deep tendon reflexes to patellar and Achilles tendon are symmetrical 2 over 4 bilaterally.  No ankle clonus or Babinski reflexes noted bilaterally.  Coordination is normal to both feet and lower extremities.   Psychiatric:         Behavior: Behavior normal.             Assessment:       Encounter Diagnoses   Name Primary?    Type 2 diabetes mellitus with  hyperglycemia, without long-term current use of insulin Yes    Onychomycosis     Corn or callus     Pain in both feet     Tarsal tunnel syndrome of both lower extremities      Independent visualization of imaging was performed.  Results were reviewed in detail with patient.       Plan:       Shelley was seen today for foot pain, ankle pain, diabetes mellitus and nail care.    Diagnoses and all orders for this visit:    Type 2 diabetes mellitus with hyperglycemia, without long-term current use of insulin    Onychomycosis    Corn or callus    Pain in both feet    Tarsal tunnel syndrome of both lower extremities    Other orders  -     DULoxetine (CYMBALTA) 30 MG capsule; Take 1 capsule (30 mg total) by mouth once daily.      I counseled the patient on her conditions, their implications and medical management.    The nature of the condition, options for management, as well as potential risks and complications were discussed in detail with patient. Patient was amenable to my recommendations and left my office fully informed and will follow up as instructed or sooner if necessary.      Decision making:  Chronic illnesses discussed in detail, previous records/notes and imaging independently reviewed, prescription drug management performed in addition to lengthy discussion regarding both conservative and surgical treatment options.    Routine Foot Care    Performed by:  Yossi Prado. DPM  Authorized by:  Patient     Consent Done?:  Yes (Verbal)     Nail Care Type:  Debride  Location(s): All  (Left 1st Toe, Left 3rd Toe, Left 2nd Toe, Left 4th Toe, Left 5th Toe, Right 1st Toe, Right 2nd Toe, Right 3rd Toe, Right 4th Toe and Right 5th Toe)  Patient tolerance:  Patient tolerated the procedure well with no immediate complications     With patient's permission, the toenails mentioned above were aggressively reduced and debrided using a nail nipper, removing all offending nail and debris. The patient will continue to monitor  the areas daily, inspect the feet, wear protective shoe gear when ambulatory, and moisturizer to maintain skin integrity.      Callus Care Type: Debride    With patient's permission, the calluses/hyperkeratotic lesions mentioned above were aggressively reduced and debrided using a number 15 blade. The patient will continue to monitor the areas daily, inspect the feet, wear protective shoe gear when ambulatory, and moisturizer to maintain skin integrity.     Nerve injection:  Bilateral posterior tibial nerve blocks/nerve block x2    Performed by:  Yossi Prado DPM    Preop diagnosis:  Neuropathy symptoms/paresthesias/pain    The area overlying the bilateral posterior tibial nerve(s) was sterilely prepped, verbal consent was obtained, 2 cc's of 0.5% Marcaine plain was infiltrated around the affected nerve(s) for a diagnostic nerve block.  This was well tolerated with no complications.    Discussed etiology of swelling at length. Discussed treatment options including but not limited to :  low sodium diet, elevation of limbs, and compression stockings. Pt will elevate limbs daily.     The nature of the condition, options for management, as well as potential risks and complications were discussed in detail with patient. Patient was amenable to my recommendations and left my office fully informed and will follow up as instructed or sooner if necessary.      Shoe inspection. Diabetic Foot Education. Patient reminded of the importance of good nutrition and blood sugar control to help prevent podiatric complications of diabetes. Patient instructed on proper foot hygeine. We discussed wearing proper shoe gear, daily foot inspections and Diabetic foot education in detail.    Refer to wound clinic.

## 2025-07-01 ENCOUNTER — OFFICE VISIT (OUTPATIENT)
Dept: ORTHOPEDICS | Facility: CLINIC | Age: 65
End: 2025-07-01
Payer: COMMERCIAL

## 2025-07-01 ENCOUNTER — HOSPITAL ENCOUNTER (OUTPATIENT)
Dept: RADIOLOGY | Facility: HOSPITAL | Age: 65
Discharge: HOME | End: 2025-07-01
Attending: ORTHOPAEDIC SURGERY
Payer: COMMERCIAL

## 2025-07-01 VITALS — HEIGHT: 57 IN | WEIGHT: 171.06 LBS | BODY MASS INDEX: 36.9 KG/M2

## 2025-07-01 DIAGNOSIS — G89.29 CHRONIC PAIN OF RIGHT KNEE: ICD-10-CM

## 2025-07-01 DIAGNOSIS — M25.562 CHRONIC PAIN OF LEFT KNEE: ICD-10-CM

## 2025-07-01 DIAGNOSIS — M17.12 PRIMARY OSTEOARTHRITIS OF LEFT KNEE: ICD-10-CM

## 2025-07-01 DIAGNOSIS — M17.11 PRIMARY OSTEOARTHRITIS OF RIGHT KNEE: Primary | ICD-10-CM

## 2025-07-01 DIAGNOSIS — G89.29 CHRONIC PAIN OF LEFT KNEE: ICD-10-CM

## 2025-07-01 DIAGNOSIS — M17.11 PRIMARY OSTEOARTHRITIS OF RIGHT KNEE: ICD-10-CM

## 2025-07-01 DIAGNOSIS — M25.561 CHRONIC PAIN OF RIGHT KNEE: ICD-10-CM

## 2025-07-01 PROCEDURE — 4010F ACE/ARB THERAPY RXD/TAKEN: CPT | Mod: CPTII,S$GLB,, | Performed by: ORTHOPAEDIC SURGERY

## 2025-07-01 PROCEDURE — 3044F HG A1C LEVEL LT 7.0%: CPT | Mod: CPTII,S$GLB,, | Performed by: ORTHOPAEDIC SURGERY

## 2025-07-01 PROCEDURE — 3008F BODY MASS INDEX DOCD: CPT | Mod: CPTII,S$GLB,, | Performed by: ORTHOPAEDIC SURGERY

## 2025-07-01 PROCEDURE — 73562 X-RAY EXAM OF KNEE 3: CPT | Mod: TC,50,FY

## 2025-07-01 PROCEDURE — 73562 X-RAY EXAM OF KNEE 3: CPT | Mod: 26,,, | Performed by: RADIOLOGY

## 2025-07-01 PROCEDURE — 99214 OFFICE O/P EST MOD 30 MIN: CPT | Mod: 25,S$GLB,, | Performed by: ORTHOPAEDIC SURGERY

## 2025-07-01 PROCEDURE — 1159F MED LIST DOCD IN RCRD: CPT | Mod: CPTII,S$GLB,, | Performed by: ORTHOPAEDIC SURGERY

## 2025-07-01 PROCEDURE — 20610 DRAIN/INJ JOINT/BURSA W/O US: CPT | Mod: 50,S$GLB,, | Performed by: ORTHOPAEDIC SURGERY

## 2025-07-01 PROCEDURE — 99999 PR PBB SHADOW E&M-EST. PATIENT-LVL V: CPT | Mod: PBBFAC,,, | Performed by: ORTHOPAEDIC SURGERY

## 2025-07-01 RX ORDER — KETOROLAC TROMETHAMINE 30 MG/ML
30 INJECTION, SOLUTION INTRAMUSCULAR; INTRAVENOUS
Status: DISCONTINUED | OUTPATIENT
Start: 2025-07-01 | End: 2025-07-01 | Stop reason: HOSPADM

## 2025-07-01 RX ORDER — LIDOCAINE HYDROCHLORIDE 10 MG/ML
4 INJECTION, SOLUTION INFILTRATION; PERINEURAL
Status: DISCONTINUED | OUTPATIENT
Start: 2025-07-01 | End: 2025-07-01 | Stop reason: HOSPADM

## 2025-07-01 RX ORDER — BUPIVACAINE HYDROCHLORIDE 5 MG/ML
5 INJECTION, SOLUTION PERINEURAL
Status: DISCONTINUED | OUTPATIENT
Start: 2025-07-01 | End: 2025-07-01 | Stop reason: HOSPADM

## 2025-07-01 RX ADMIN — BUPIVACAINE HYDROCHLORIDE 5 ML: 5 INJECTION, SOLUTION PERINEURAL at 01:07

## 2025-07-01 RX ADMIN — KETOROLAC TROMETHAMINE 30 MG: 30 INJECTION, SOLUTION INTRAMUSCULAR; INTRAVENOUS at 01:07

## 2025-07-01 RX ADMIN — LIDOCAINE HYDROCHLORIDE 4 ML: 10 INJECTION, SOLUTION INFILTRATION; PERINEURAL at 01:07

## 2025-07-01 NOTE — PROGRESS NOTES
[facility] [address]          Subjective:     Patient ID: Shelley Levy is a 64 y.o. female.    Chief Complaint: Pain of the Left Knee and Pain of the Right Knee          Past Medical History:   Diagnosis Date    Type 2 diabetes mellitus with neurologic complication 2023        Past Surgical History:   Procedure Laterality Date    BREAST BIOPSY      BREAST LUMPECTOMY          Current Outpatient Medications   Medication Instructions    acetaminophen-codeine 300-30mg (TYLENOL #3) 300-30 mg Tab take 1 tablet by mouth every 4 hours    amitriptyline (ELAVIL) 10 mg, Oral, Nightly    amoxicillin-clavulanate 875-125mg (AUGMENTIN) 875-125 mg per tablet 1 tablet, Oral, Every 12 hours    betamethasone dipropionate (DIPROLENE) 0.05 % ointment Topical (Top), 2 times daily, To rash on left leg    betamethasone dipropionate 0.05 % cream Topical (Top), 2 times daily, To AA for rash    blood sugar diagnostic Strp To check BG 4 times daily, to use with insurance preferred meter    blood-glucose meter kit To check BG 4 times daily, to use with insurance preferred meter    calcipotriene (DOVONOX) 0.005 % cream Topical (Top), 2 times daily, To psoriasis    chlorhexidine (PERIDEX) 0.12 % solution SMARTSI.5 Ounce(s) By Mouth Twice Daily    clobetasoL (TEMOVATE) 0.05 % cream Topical (Top), 2 times daily, Use two weeks then take a one week break. Repeat in 2 week intervals.    clobetasoL (TEMOVATE) 0.05 % external solution Topical (Top), 2 times daily, Use two weeks then take a one week break. Repeat in 2 week intervals.    collagenase (SANTYL) ointment Topical (Top), Every other day    collagenase (SANTYL) ointment Apply topically daily    COSENTYX PEN, 2 PENS, 150 mg/mL PnIj Inject 300mg into the skin weekly x 5 weeks, then inject 300mg once monthly    cyanocobalamin (VITAMIN B-12) 1,000 mcg, Oral, Daily    DULoxetine (CYMBALTA) 30 mg, Oral, Daily    fluticasone propionate (FLONASE) 100 mcg, Each Nostril    furosemide  "(LASIX) 20 mg, Oral    gabapentin (NEURONTIN) 100 mg, 3 times daily    gentamicin (GARAMYCIN) 0.1 % ointment Topical (Top), Every other day    gentamicin (GARAMYCIN) 0.1 % ointment Apply topically daily as directed    hydrOXYzine HCL (ATARAX) 25 MG tablet Take 1-2 tabs qhs prn severe itching    ibuprofen (ADVIL,MOTRIN) 800 mg, Every 8 hours PRN    ipratropium (ATROVENT) 21 mcg (0.03 %) nasal spray INSTILL 2 SPRAYS INTO EACH NOSTRIL 3 TIMES A DAY    ketoconazole (NIZORAL) 2 % cream Topical (Top), 2 times daily, To rash under breasts and on back    ketoconazole (NIZORAL) 2 % shampoo Topical (Top), Daily, Use in shower on rash, then rinse.    lancets Misc To check BG 4 times daily, to use with insurance preferred meter    LANTUS SOLOSTAR U-100 INSULIN 10 Units, Subcutaneous, Nightly, Keep same dose as levemir.    levocetirizine (XYZAL) 5 mg, Oral, Nightly    LIDOcaine-prilocaine (EMLA) cream Topical (Top), As needed (PRN)    losartan (COZAAR) 12.5 mg, Oral    metFORMIN (GLUCOPHAGE-XR) 1,000 mg, Oral, 2 times daily with meals    mupirocin (BACTROBAN) 2 % ointment apply topically daily as directed    pen needle, diabetic (BD PRECIOUS 2ND GEN PEN NEEDLE) 32 gauge x 5/32" Ndle Use to administer insulin under the skin one (1) time a day; discard pen needle after each use.    pravastatin (PRAVACHOL) 20 mg, Oral    prednisoLONE acetate (PRED FORTE) 1 % DrpS 1 drop, Both Eyes, 4 times daily    pregabalin (LYRICA) 100 MG capsule TAKE 1 CAPSULE (100 MG TOTAL) BY MOUTH THREE TIMES DAILY.    secukinumab (COSENTYX UNOREADY PEN) 300 mg/2 mL PnIj Inject 1 pen (300mg) into the skin once every 28 days    semaglutide (OZEMPIC) 0.25 mg or 0.5 mg (2 mg/3 mL) pen injector Inject 0.25 mg into the skin once weekly for 4 weeks, then increase to 0.5 mg once weekly thereafter    terbinafine HCL (LAMISIL) 250 mg, Oral    TRUEPLUS LANCETS 33 gauge Misc 4 times daily        Review of patient's allergies indicates:  No Known Allergies    Social " History[1]    Family History   Problem Relation Name Age of Onset    Breast cancer Mother      Ovarian cancer Mother      No Known Problems Father      No Known Problems Sister      No Known Problems Maternal Aunt      No Known Problems Maternal Uncle      No Known Problems Paternal Aunt      No Known Problems Paternal Uncle      Macular degeneration Maternal Grandmother      No Known Problems Maternal Grandfather      No Known Problems Paternal Grandmother      No Known Problems Paternal Grandfather      No Known Problems Brother      Amblyopia Neg Hx      Blindness Neg Hx      Cancer Neg Hx      Cataracts Neg Hx      Diabetes Neg Hx      Glaucoma Neg Hx      Hypertension Neg Hx      Retinal detachment Neg Hx      Strabismus Neg Hx      Stroke Neg Hx      Thyroid disease Neg Hx           Review of systems negative except for noted in HPI    Objective:   Physical Exam:     *** knee  Skin atraumatic   *** effusion  *** Tender to palpation over medial joint line, ***tender to palpation lateral joint line  ROM ***  Crepitus with ROM***  Stable anterior/posterior   Stable varus/valgus  No groin pain with rotation of hip  Grossly NVI distally    Imaging: ***  X-Ray knee reviewed, KL *** changes with joint space narrowing, sclerosis, and osteophytosis.      Assessment:        Shelley Levy is a 64 y.o. female    No diagnosis found.    Plan :                No orders of the defined types were placed in this encounter.       Mitch Ruff MD   07/01/2025          Spencer Sharma MD  [employer] PGY-3          [1]   Social History  Socioeconomic History    Marital status:    Tobacco Use    Smoking status: Never    Smokeless tobacco: Never   Substance and Sexual Activity    Alcohol use: No    Drug use: Never    Sexual activity: Not Currently     Partners: Female     Social Drivers of Health     Financial Resource Strain: Low Risk  (3/7/2024)    Overall Financial Resource Strain (CARDIA)     Difficulty  of Paying Living Expenses: Not hard at all   Food Insecurity: No Food Insecurity (3/7/2024)    Hunger Vital Sign     Worried About Running Out of Food in the Last Year: Never true     Ran Out of Food in the Last Year: Never true   Transportation Needs: No Transportation Needs (3/7/2024)    PRAPARE - Transportation     Lack of Transportation (Medical): No     Lack of Transportation (Non-Medical): No   Physical Activity: Unknown (3/7/2024)    Exercise Vital Sign     Days of Exercise per Week: 4 days   Stress: No Stress Concern Present (3/7/2024)    [employer] [place] [employer] - Occupational Stress Questionnaire     Feeling of Stress : Only a little   Housing Stability: Unknown (3/7/2024)    Housing Stability Vital Sign     Unable to Pay for Housing in the Last Year: No     Unstable Housing in the Last Year: No

## 2025-07-01 NOTE — PROCEDURES
Large Joint Aspiration/Injection: bilateral knee    Date/Time: 7/1/2025 1:00 PM    Performed by: nEio Jimenez MD  Authorized by: Enio Jimenez MD    Consent Done?:  Yes (Verbal)  Indications:  Arthritis  Site marked: the procedure site was marked    Timeout: prior to procedure the correct patient, procedure, and site was verified    Prep: patient was prepped and draped in usual sterile fashion      Local anesthesia used?: Yes    Local anesthetic:  Topical anesthetic    Details:  Needle Size:  22 G  Ultrasonic Guidance for needle placement?: No    Approach:  Anterolateral  Location:  Knee  Laterality:  Bilateral  Site:  Bilateral knee  Medications (Right):  30 mg ketorolac 30 mg/1 mL; 5 mL BUPivacaine 0.5 % (5 mg/mL); 4 mL LIDOcaine HCL 10 mg/ml (1%) 10 mg/mL (1 %)  Medications (Left):  30 mg ketorolac 30 mg/1 mL; 5 mL BUPivacaine 0.5 % (5 mg/mL); 4 mL LIDOcaine HCL 10 mg/ml (1%) 10 mg/mL (1 %)  Patient tolerance:  Patient tolerated the procedure well with no immediate complications

## 2025-07-01 NOTE — PROGRESS NOTES
Subjective:      Patient ID: Shelley Levy is a 64 y.o. female.    Chief Complaint: Pain of the Left Knee and Pain of the Right Knee    Knee Pain: bilateral  swelling: Yes  worsens with activity: Yes  relieved by: injections         Social History     Occupational History    Not on file   Tobacco Use    Smoking status: Never    Smokeless tobacco: Never   Substance and Sexual Activity    Alcohol use: No    Drug use: Never    Sexual activity: Not Currently     Partners: Female      Social Drivers of Health     Tobacco Use: Low Risk  (7/1/2025)    Patient History     Smoking Tobacco Use: Never     Smokeless Tobacco Use: Never     Passive Exposure: Not on file   Alcohol Use: Not At Risk (3/7/2024)    AUDIT-C     Frequency of Alcohol Consumption: Never     Average Number of Drinks: Patient does not drink     Frequency of Binge Drinking: Never   Financial Resource Strain: Low Risk  (3/7/2024)    Overall Financial Resource Strain (CARDIA)     Difficulty of Paying Living Expenses: Not hard at all   Food Insecurity: No Food Insecurity (3/7/2024)    Hunger Vital Sign     Worried About Running Out of Food in the Last Year: Never true     Ran Out of Food in the Last Year: Never true   Transportation Needs: No Transportation Needs (3/7/2024)    PRAPARE - Transportation     Lack of Transportation (Medical): No     Lack of Transportation (Non-Medical): No   Physical Activity: Unknown (3/7/2024)    Exercise Vital Sign     Days of Exercise per Week: 4 days     Minutes of Exercise per Session: Not on file   Stress: No Stress Concern Present (3/7/2024)    [employer] [place] [employer] - Occupational Stress Questionnaire     Feeling of Stress : Only a little   Housing Stability: Unknown (3/7/2024)    Housing Stability Vital Sign     Unable to Pay for Housing in the Last Year: No     Number of Places Lived in the Last Year: Not on file     Unstable Housing in the Last Year: No   Depression: Low Risk  (6/3/2025)     Depression     Last PHQ-4: Flowsheet Data: 0   Utilities: Not on file   Health Literacy: Not on file   Social Isolation: Not on file      Review of Systems   Constitutional: Negative for diaphoresis.   HENT:  Negative for ear discharge, nosebleeds and stridor.    Eyes:  Negative for photophobia.   Cardiovascular:  Negative for syncope.   Respiratory:  Negative for hemoptysis, shortness of breath and wheezing.    Neurological:  Negative for tremors.   Psychiatric/Behavioral: Negative.           Objective:    General    Constitutional: She is oriented to person, place, and time. She appears well-developed and well-nourished.   HENT:   Head: Normocephalic and atraumatic.   Right Ear: External ear normal.   Left Ear: External ear normal.   Eyes: EOM are normal.   Cardiovascular:  Intact distal pulses.            Pulmonary/Chest: Effort normal.   Neurological: She is alert and oriented to person, place, and time.   Psychiatric: She has a normal mood and affect. Her behavior is normal. Judgment and thought content normal.     General Musculoskeletal Exam   Gait: antalgic and abnormal       Right Knee Exam     Inspection   Swelling: present  Effusion: present    Tenderness   The patient is tender to palpation of the medial joint line.    Crepitus   The patient has crepitus of the patella.    Range of Motion   Flexion:  abnormal     Other   Sensation: normal    Left Knee Exam     Inspection   Swelling: present  Effusion: present    Tenderness   The patient tender to palpation of the medial joint line.    Crepitus   The patient has crepitus of the patella.    Other   Sensation: normal    Muscle Strength   Right Lower Extremity   Quadriceps:  4/5   Hamstrin/5   Left Lower Extremity   Quadriceps:  4/5   Hamstrin/5          Assessment:       1. Primary osteoarthritis of right knee    2. Primary osteoarthritis of left knee    3. Chronic pain of left knee    4. Chronic pain of right knee          Plan:   we injected the  bilateral knee w 1cc of ketorolac, marcaine and lidocaine under sterile conditions with the patient's informed consent for severe bone on bone knee oa. KL score 4.  Will think about tka sometime next year

## 2025-07-03 ENCOUNTER — HOSPITAL ENCOUNTER (OUTPATIENT)
Dept: CARDIOLOGY | Facility: HOSPITAL | Age: 65
Discharge: HOME | End: 2025-07-03
Attending: INTERNAL MEDICINE
Payer: COMMERCIAL

## 2025-07-03 ENCOUNTER — TELEPHONE (OUTPATIENT)
Dept: DERMATOLOGY | Facility: CLINIC | Age: 65
End: 2025-07-03
Payer: COMMERCIAL

## 2025-07-03 DIAGNOSIS — I87.2 VENOUS INSUFFICIENCY: ICD-10-CM

## 2025-07-03 LAB
LEFT ABI: 1.06
LEFT ARM BP: 134 MMHG
LEFT DORSALIS PEDIS: 144 MMHG
LEFT GREAT SAPHENOUS DISTAL THIGH DIA: 0.26 CM
LEFT GREAT SAPHENOUS DISTAL THIGH REFLUX: 3551 MS
LEFT GREAT SAPHENOUS JUNCTION DIA: 0.26 CM
LEFT GREAT SAPHENOUS JUNCTION REFLUX: 1958 MS
LEFT GREAT SAPHENOUS KNEE DIA: 0.3 CM
LEFT GREAT SAPHENOUS KNEE REFLUX: 3049 MS
LEFT GREAT SAPHENOUS MIDDLE THIGH DIA: 0.2 CM
LEFT GREAT SAPHENOUS PROXIMAL CALF DIA: 0.25 CM
LEFT POSTERIOR TIBIAL: 149 MMHG
LEFT SMALL SAPHENOUS KNEE DIA: 0.16 CM
LEFT SMALL SAPHENOUS SPJ DIA: 0.13 CM
RIGHT ABI: 1.07
RIGHT ARM BP: 141 MMHG
RIGHT DORSALIS PEDIS: 126 MMHG
RIGHT GREAT SAPHENOUS DISTAL THIGH DIA: 0.2 CM
RIGHT GREAT SAPHENOUS JUNCTION DIA: 0.37 CM
RIGHT GREAT SAPHENOUS JUNCTION REFLUX: 1393 MS
RIGHT GREAT SAPHENOUS KNEE DIA: 0.24 CM
RIGHT GREAT SAPHENOUS MIDDLE THIGH DIA: 0.33 CM
RIGHT GREAT SAPHENOUS PROXIMAL CALF DIA: 0.2 CM
RIGHT POSTERIOR TIBIAL: 151 MMHG
RIGHT SMALL SAPHENOUS KNEE DIA: 0.18 CM
RIGHT SMALL SAPHENOUS SPJ DIA: 0.16 CM

## 2025-07-03 PROCEDURE — 93922 UPR/L XTREMITY ART 2 LEVELS: CPT

## 2025-07-03 PROCEDURE — 93970 EXTREMITY STUDY: CPT | Mod: 26,,, | Performed by: INTERNAL MEDICINE

## 2025-07-03 PROCEDURE — 93922 UPR/L XTREMITY ART 2 LEVELS: CPT | Mod: 26,,, | Performed by: INTERNAL MEDICINE

## 2025-07-03 PROCEDURE — 93970 EXTREMITY STUDY: CPT | Mod: TC

## 2025-07-17 ENCOUNTER — CLINICAL SUPPORT (OUTPATIENT)
Dept: REHABILITATION | Facility: HOSPITAL | Age: 65
End: 2025-07-17
Payer: COMMERCIAL

## 2025-07-17 DIAGNOSIS — S81.801D WOUND OF RIGHT LOWER EXTREMITY, SUBSEQUENT ENCOUNTER: ICD-10-CM

## 2025-07-17 DIAGNOSIS — I87.2 VENOUS INSUFFICIENCY: Primary | ICD-10-CM

## 2025-07-17 DIAGNOSIS — I89.0 LYMPHEDEMA: ICD-10-CM

## 2025-07-17 PROCEDURE — 97535 SELF CARE MNGMENT TRAINING: CPT

## 2025-07-17 PROCEDURE — 97162 PT EVAL MOD COMPLEX 30 MIN: CPT

## 2025-07-17 NOTE — LETTER
July 31, 2025  Stiven Guerrero MD  [address]  [facility] [address]      To whom it may concern,     The attached plan of care is being sent to you for review and reference.    You may indicate your approval by signing the document electronically, or by faxing/mailing a signed copy of the final page of this document back to the attention of Vanessa Slaugther, PT:         Plan of Care 7/17/25   Effective from: 7/17/2025  Effective to: 9/17/2025    Active  Plan ID: [id number]           Participants as of 7/31/2025    Name Type Comments Contact Info    Stiven Guerrero MD Referring Provider  [phone]    Vanessa Slaughter, PT Physical Therapist        Last Plan Note     Author: Vanessa Slaughter PT Status: Signed Last edited: 7/17/2025  3:30 PM         Outpatient Rehab    Physical Therapy Evaluation    Patient Name: Shelley Levy  MRN: [id number]  YOB: 1960  Encounter Date: 7/17/2025    Therapy Diagnosis:   Encounter Diagnoses   Name Primary?    Lymphedema     Venous insufficiency Yes    Wound of right lower extremity, subsequent encounter      Physician: Stiven Guerrero MD    Physician Orders: Eval and Treat  Medical Diagnosis: Lymphedema  Surgical Diagnosis: Not applicable for this Episode   Surgical Date: Not applicable for this Episode  Days Since Last Surgery: Not applicable for this Episode    Visit # / Visits Authorized:  1 / 1  Insurance Authorization Period: 6/12/2025 to 12/31/2025  Date of Evaluation: 7/17/2025  Plan of Care Certification: 7/17/2025 to 9/17/2025     Time In: 1530   Time Out: 1620  Total Time (in minutes): 50   Total Billable Time (in minutes):  50    Intake Outcome Measure for FOTO Survey    Therapist reviewed FOTO scores for Shelley Levy on 7/17/2025.   FOTO report - see Media section or FOTO account episode details.     Intake Score (%): Not applicable for this Episode    Precautions:       Subjective   History of Present Illness  Shelley is a 64 y.o.  female who reports to physical therapy with a chief concern of B leg swelling.     The patient reports a medical diagnosis of I89.0 (ICD-10-CM) - Lymphedema, not elsewhere classified.            Diagnostic tests related to this condition: Ultrasound and Other (Comment).     Ultrasound Details:   There is no evidence of a right lower extremity DVT.    The right common femoral vein has reflux.    The right greater saphenous vein has reflux.    There is no evidence of a left lower extremity DVT.    The left greater saphenous vein has reflux.  Other Diagnostic Test Details:   Normal resting ABIs bilaterally.    PVR waveforms are moderately to severely dampened at the tibial level bilaterally.    History of Present Condition/Illness: She has BLE swelling that started approx. One year ago. She has been to PT before for her muscles and her legs. Dr. Guerrero recommended compression socks. She had a wound on her R ankle that started approx one year ago and she needed to go to wound care for. She went to wound care for about 8 months. She was told the swelling and rubbing on the inside of her shoe caused the wounds. She was given an Rx for compression stockings and got them from [facility] and they thought they did not fit well. They went up too high and when she tried to get a different size but had a bad customer experience at [facility] so she did not go back. She currently has no wounds. Swelling is lower legs and sometimes the knees          Additional Lymphedema History  The patient's medical history relevant to lymphedema includes Other (Comment) and History of infection in at-risk limb. Specifically, there is a history of Cellulitis in the at-risk limb.   DM,     Activities of Daily Living  Previously independent with activities of daily living? Yes     Currently independent with activities of daily living? No  Activities currently needing assistance include Bathing.   Dressing, bathing     Previously independent with  instrumental activities of daily living? Yes     Currently independent with instrumental activities of daily living? Yes              Pain     Patient reports a current pain level of 7/10. Pain at best is reported as 0/10. Pain at worst is reported as 7/10.   Location: L knee  Pain Qualities: Burning  Pain-Relieving Factors: Rest  Pain-Aggravating Factors: Walking, Standing         Living Arrangements  Living Situation  Housing: Home independently  Living Arrangements: Spouse/significant other    Home Setup  Type of Structure: House  Home Access: Stairs without rails  Number of Levels in Home: One level    Equipment/Treatments  Mobility Equipment: Wheeled walker        Employment  Does the patient's condition impact their ability to work?: No     Retired       Past Medical History/Physical Systems Review:   Shelley Levy  has a past medical history of Type 2 diabetes mellitus with neurologic complication.    Shelley Levy  has a past surgical history that includes Breast lumpectomy and Breast biopsy.    Shelley has a current medication list which includes the following prescription(s): acetaminophen-codeine 300-30mg, amitriptyline, amoxicillin-clavulanate 875-125mg, betamethasone dipropionate, betamethasone dipropionate, bimekizumab-bkzx, bimekizumab-bkzx, blood sugar diagnostic, blood-glucose meter, calcipotriene, chlorhexidine, clobetasol, clobetasol, collagenase, collagenase, cosentyx pen (2 pens), cyanocobalamin, duloxetine, fluticasone propionate, furosemide, gabapentin, gentamicin, gentamicin, hydroxyzine hcl, ibuprofen, lantus solostar u-100 insulin, ipratropium, ketoconazole, ketoconazole, lancets, levocetirizine, lidocaine-prilocaine, losartan, metformin, mupirocin, pen needle, diabetic, pravastatin, prednisolone acetate, pregabalin, cosentyx unoready pen, ozempic, terbinafine hcl, and trueplus lancets, and the following Facility-Administered Medications: fluocinolone and fluocinolone.    Review of  patient's allergies indicates:  No Known Allergies     Objective   Lower Extremity Skin Observations  Right Lower Extremity Skin Observations  Right Lower Extremity Edema Non-pitting or Pitting: Non-pitting  Right Lower Extremity Non-pitting Edema Severity: Moderate  Right Lower Extremity Vascular Changes: Varicosities  Right Lower Extremity Stemmer's Sign: Yes    Left Lower Extremity Skin Observations  Left Lower Extremity Edema Non-pitting or Pitting: Non-pitting  Left Lower Extremity Non-pitting Edema Severity: Moderate  Left Lower Extremity Vascular Changes: Varicosities  Left Lower Extremity Stemmer's Sign: Yes             Lower Extremity Sensation  General Lumbar/Lower Extremity Sensation  Impaired: Right and Left  Right Lumbar/Lower Extremity Sensation  Diminished: Light Touch  Right Lumbar/Lower Extremity Sensation Light Touch Comment: toes    Left Lumbar/Lower Extremity Sensation  Diminished: Light Touch  Left Lumbar/Lower Extremity Sensation Light Touch Comment: toes                  Girth Measurements:             Treatment:  Other Activities  Activity 1: self care/ home management: Patient was educated in   Compression needs: 20-30 mm hg knee highs  Home management plan:  Wear schedule: Mitchell first thing in the morning, remove at the end of the day as close to bedtime as possible. Do not sleep in garments. If using a home pneumatic pump, remove garments when using pump. If it is not yet bedtime, resume wearing garments until bed.  Cost/coverage of compression garments: Medicare now pays for compression. Private insurance coverage is on a case-by-case basis. In order to determine coverage an Rx with a diagnosis of lymphedema is sent to a participating DME for a benefits check.   Commitment to attendance and securing compression needs are critical to lymphedema management  Monitor for signs/symptoms of infection and seek medical attention immediately if symptoms occur.    Time Entry(in minutes):  PT  Evaluation (Moderate) Time Entry: 30  Self Care/Home Management (ADLs) Time Entry: 20    Assessment & Plan   Assessment  Shelley presents with a condition of Moderate complexity.   Presentation of Symptoms: Evolving  Will Comorbidities Impact Care: Yes  DM, infection     Functional Limitations: Other (Comment)  Other Functional Limitations: dressing, bathing  Impairments: Other (Comment)  Other Impairments: edema, skin integrity    Prognosis: Fair  Assessment Details: Patient presents to clinic with mild swelling of bilateral lower legs with stemmer sign positive bilaterally, varicosities, and sensation diminished in bilateral toes. Patient has history of chronic wounds with poor healing times, but currently skin is intact. Patient reports previous wearing of compression, but stopped due to poor fit. Patient was educated on physiology of lymphedema and chronic venous insufficiency, components of therapy, and need for compression to manage edema. Patient in agreement to focus on preparing for home management, including procurement of compression. Patient would benefit from therapy to decrease edema, aid in finding compression, and preparing patient for home management to decrease risk of further wounds and infection.     Plan  From a physical therapy perspective, the patient would benefit from: Skilled Rehab Services    Planned therapy interventions include: Therapeutic exercise, Therapeutic activities, Neuromuscular re-education, Manual therapy, ADLs/IADLs, and Lymphatic compression wrapping.    Planned modalities to include: Vasopneumatic pump.        Visit Frequency: 2 times Per Week for 8 Weeks.       This plan was discussed with Patient.   Discussion participants: Agreed Upon Plan of Care             The patient's spiritual, cultural, and educational needs were considered, and the patient is agreeable to the plan of care and goals.           Goals:   Active       Long Term Goals        1. Patient will show  decreased girth in B LE by up to 2 cm  to allow for LE symmetry, shoe and clothing choice, and ability to apply needed compression daily       Start:  07/31/25    Expected End:  09/17/25            2. Patient will show reduction in density to mild or less with improved contour of limb to allow for cosmesis, LE symmetry, infection risk reduction, and clothing and compression choice.          Start:  07/31/25    Expected End:  09/17/25            3. Patient to adebayo/doff compression garment with daily compliance to assist in lymphedema management, skin elasticity, and tissue density       Start:  07/31/25    Expected End:  09/17/25            4. Pt to be I and compliant with HEP to allow for increased function in affected limb.          Start:  07/31/25    Expected End:  09/17/25               Short Term Goals        1. Patient will show decreased girth in B LE by up to 1 cm to allow for LE symmetry, shoe and clothing choice, and ability to apply needed compression.         Start:  07/31/25    Expected End:  08/17/25            2. Patient will demonstrate 100% knowledge of lymphedema precautions and signs of infection to allow for reduced lymphedema risk, infection risk, and/or exacerbation of condition.         Start:  07/31/25    Expected End:  08/17/25            3. Patient or caregiver will perform self-bandaging techniques and/or wearing of compression garments to allow for lymphatic drainage support, skin elasticity, and reduction in shape and size of limb       Start:  07/31/25    Expected End:  08/17/25            4. Patient will tolerate daily activities with multilayered bandaging to allow for lymphatic and venous support.         Start:  07/31/25    Expected End:  08/17/25                Vanessa Slaughter PT               Sincerely,      Vanessa Slaughter PT  [facility]                                                            Dear Vanessa Slaughter PT,    RE: Ms. Shelley Levy, MRN: [id number]    I certify  that I have reviewed the attached plan of care and agree to the details within.        ___________________________  ___________________________  Provider Printed Name   Provider Signed Name      ___________________________  Date and Time daily

## 2025-07-28 ENCOUNTER — OFFICE VISIT (OUTPATIENT)
Dept: DERMATOLOGY | Facility: CLINIC | Age: 65
End: 2025-07-28
Payer: COMMERCIAL

## 2025-07-28 DIAGNOSIS — L30.9 DERMATITIS: ICD-10-CM

## 2025-07-28 DIAGNOSIS — Z79.899 ENCOUNTER FOR LONG-TERM CURRENT USE OF HIGH RISK MEDICATION: ICD-10-CM

## 2025-07-28 DIAGNOSIS — L40.9 PSORIASIS: Primary | ICD-10-CM

## 2025-07-28 PROCEDURE — 1159F MED LIST DOCD IN RCRD: CPT | Mod: CPTII,95,, | Performed by: DERMATOLOGY

## 2025-07-28 PROCEDURE — 3044F HG A1C LEVEL LT 7.0%: CPT | Mod: CPTII,95,, | Performed by: DERMATOLOGY

## 2025-07-28 PROCEDURE — 4010F ACE/ARB THERAPY RXD/TAKEN: CPT | Mod: CPTII,95,, | Performed by: DERMATOLOGY

## 2025-07-28 PROCEDURE — 1160F RVW MEDS BY RX/DR IN RCRD: CPT | Mod: CPTII,95,, | Performed by: DERMATOLOGY

## 2025-07-28 PROCEDURE — 98006 SYNCH AUDIO-VIDEO EST MOD 30: CPT | Mod: 95,,, | Performed by: DERMATOLOGY

## 2025-07-28 RX ORDER — BETAMETHASONE DIPROPIONATE 0.5 MG/G
CREAM TOPICAL 2 TIMES DAILY
Qty: 45 G | Refills: 5 | Status: SHIPPED | OUTPATIENT
Start: 2025-07-28

## 2025-07-31 NOTE — PROGRESS NOTES
Outpatient Rehab    Physical Therapy Evaluation    Patient Name: Shelley Levy  MRN: [id number]  YOB: 1960  Encounter Date: 7/17/2025    Therapy Diagnosis:   Encounter Diagnoses   Name Primary?    Lymphedema     Venous insufficiency Yes    Wound of right lower extremity, subsequent encounter      Physician: Stiven Guerrero MD    Physician Orders: Eval and Treat  Medical Diagnosis: Lymphedema  Surgical Diagnosis: Not applicable for this Episode   Surgical Date: Not applicable for this Episode  Days Since Last Surgery: Not applicable for this Episode    Visit # / Visits Authorized:  1 / 1  Insurance Authorization Period: 6/12/2025 to 12/31/2025  Date of Evaluation: 7/17/2025  Plan of Care Certification: 7/17/2025 to 9/17/2025     Time In: 1530   Time Out: 1620  Total Time (in minutes): 50   Total Billable Time (in minutes):  50    Intake Outcome Measure for FOTO Survey    Therapist reviewed FOTO scores for Shelley Levy on 7/17/2025.   FOTO report - see Media section or FOTO account episode details.     Intake Score (%): Not applicable for this Episode    Precautions:       Subjective   History of Present Illness  Shelley is a 64 y.o. female who reports to physical therapy with a chief concern of B leg swelling.     The patient reports a medical diagnosis of I89.0 (ICD-10-CM) - Lymphedema, not elsewhere classified.            Diagnostic tests related to this condition: Ultrasound and Other (Comment).     Ultrasound Details:   There is no evidence of a right lower extremity DVT.    The right common femoral vein has reflux.    The right greater saphenous vein has reflux.    There is no evidence of a left lower extremity DVT.    The left greater saphenous vein has reflux.  Other Diagnostic Test Details:   Normal resting ABIs bilaterally.    PVR waveforms are moderately to severely dampened at the tibial level bilaterally.    History of Present Condition/Illness: She has BLE swelling that started  approx. One year ago. She has been to PT before for her muscles and her legs. Dr. Guerrero recommended compression socks. She had a wound on her R ankle that started approx one year ago and she needed to go to wound care for. She went to wound care for about 8 months. She was told the swelling and rubbing on the inside of her shoe caused the wounds. She was given an Rx for compression stockings and got them from [facility] and they thought they did not fit well. They went up too high and when she tried to get a different size but had a bad customer experience at [facility] so she did not go back. She currently has no wounds. Swelling is lower legs and sometimes the knees          Additional Lymphedema History  The patient's medical history relevant to lymphedema includes Other (Comment) and History of infection in at-risk limb. Specifically, there is a history of Cellulitis in the at-risk limb.   DM,     Activities of Daily Living  Previously independent with activities of daily living? Yes     Currently independent with activities of daily living? No  Activities currently needing assistance include Bathing.   Dressing, bathing     Previously independent with instrumental activities of daily living? Yes     Currently independent with instrumental activities of daily living? Yes              Pain     Patient reports a current pain level of 7/10. Pain at best is reported as 0/10. Pain at worst is reported as 7/10.   Location: L knee  Pain Qualities: Burning  Pain-Relieving Factors: Rest  Pain-Aggravating Factors: Walking, Standing         Living Arrangements  Living Situation  Housing: Home independently  Living Arrangements: Spouse/significant other    Home Setup  Type of Structure: House  Home Access: Stairs without rails  Number of Levels in Home: One level    Equipment/Treatments  Mobility Equipment: Wheeled walker        Employment  Does the patient's condition impact their ability to work?: No     Retired       Past Medical  History/Physical Systems Review:   Shelley Levy  has a past medical history of Type 2 diabetes mellitus with neurologic complication.    Shelley Levy  has a past surgical history that includes Breast lumpectomy and Breast biopsy.    Shelley has a current medication list which includes the following prescription(s): acetaminophen-codeine 300-30mg, amitriptyline, amoxicillin-clavulanate 875-125mg, betamethasone dipropionate, betamethasone dipropionate, bimekizumab-bkzx, bimekizumab-bkzx, blood sugar diagnostic, blood-glucose meter, calcipotriene, chlorhexidine, clobetasol, clobetasol, collagenase, collagenase, cosentyx pen (2 pens), cyanocobalamin, duloxetine, fluticasone propionate, furosemide, gabapentin, gentamicin, gentamicin, hydroxyzine hcl, ibuprofen, lantus solostar u-100 insulin, ipratropium, ketoconazole, ketoconazole, lancets, levocetirizine, lidocaine-prilocaine, losartan, metformin, mupirocin, pen needle, diabetic, pravastatin, prednisolone acetate, pregabalin, cosentyx unoready pen, ozempic, terbinafine hcl, and trueplus lancets, and the following Facility-Administered Medications: fluocinolone and fluocinolone.    Review of patient's allergies indicates:  No Known Allergies     Objective   Lower Extremity Skin Observations  Right Lower Extremity Skin Observations  Right Lower Extremity Edema Non-pitting or Pitting: Non-pitting  Right Lower Extremity Non-pitting Edema Severity: Moderate  Right Lower Extremity Vascular Changes: Varicosities  Right Lower Extremity Stemmer's Sign: Yes    Left Lower Extremity Skin Observations  Left Lower Extremity Edema Non-pitting or Pitting: Non-pitting  Left Lower Extremity Non-pitting Edema Severity: Moderate  Left Lower Extremity Vascular Changes: Varicosities  Left Lower Extremity Stemmer's Sign: Yes             Lower Extremity Sensation  General Lumbar/Lower Extremity Sensation  Impaired: Right and Left  Right Lumbar/Lower Extremity Sensation  Diminished:  Light Touch  Right Lumbar/Lower Extremity Sensation Light Touch Comment: toes    Left Lumbar/Lower Extremity Sensation  Diminished: Light Touch  Left Lumbar/Lower Extremity Sensation Light Touch Comment: toes                  Girth Measurements:             Treatment:  Other Activities  Activity 1: self care/ home management: Patient was educated in   Compression needs: 20-30 mm hg knee highs  Home management plan:  Wear schedule: Mitchell first thing in the morning, remove at the end of the day as close to bedtime as possible. Do not sleep in garments. If using a home pneumatic pump, remove garments when using pump. If it is not yet bedtime, resume wearing garments until bed.  Cost/coverage of compression garments: Medicare now pays for compression. Private insurance coverage is on a case-by-case basis. In order to determine coverage an Rx with a diagnosis of lymphedema is sent to a participating DME for a benefits check.   Commitment to attendance and securing compression needs are critical to lymphedema management  Monitor for signs/symptoms of infection and seek medical attention immediately if symptoms occur.    Time Entry(in minutes):  PT Evaluation (Moderate) Time Entry: 30  Self Care/Home Management (ADLs) Time Entry: 20    Assessment & Plan   Assessment  Shelley presents with a condition of Moderate complexity.   Presentation of Symptoms: Evolving  Will Comorbidities Impact Care: Yes  DM, infection     Functional Limitations: Other (Comment)  Other Functional Limitations: dressing, bathing  Impairments: Other (Comment)  Other Impairments: edema, skin integrity    Prognosis: Fair  Assessment Details: Patient presents to clinic with mild swelling of bilateral lower legs with stemmer sign positive bilaterally, varicosities, and sensation diminished in bilateral toes. Patient has history of chronic wounds with poor healing times, but currently skin is intact. Patient reports previous wearing of compression, but  stopped due to poor fit. Patient was educated on physiology of lymphedema and chronic venous insufficiency, components of therapy, and need for compression to manage edema. Patient in agreement to focus on preparing for home management, including procurement of compression. Patient would benefit from therapy to decrease edema, aid in finding compression, and preparing patient for home management to decrease risk of further wounds and infection.     Plan  From a physical therapy perspective, the patient would benefit from: Skilled Rehab Services    Planned therapy interventions include: Therapeutic exercise, Therapeutic activities, Neuromuscular re-education, Manual therapy, ADLs/IADLs, and Lymphatic compression wrapping.    Planned modalities to include: Vasopneumatic pump.        Visit Frequency: 2 times Per Week for 8 Weeks.       This plan was discussed with Patient.   Discussion participants: Agreed Upon Plan of Care             The patient's spiritual, cultural, and educational needs were considered, and the patient is agreeable to the plan of care and goals.           Goals:   Active       Long Term Goals        1. Patient will show decreased girth in B LE by up to 2 cm  to allow for LE symmetry, shoe and clothing choice, and ability to apply needed compression daily       Start:  07/31/25    Expected End:  09/17/25            2. Patient will show reduction in density to mild or less with improved contour of limb to allow for cosmesis, LE symmetry, infection risk reduction, and clothing and compression choice.          Start:  07/31/25    Expected End:  09/17/25            3. Patient to adebayo/doff compression garment with daily compliance to assist in lymphedema management, skin elasticity, and tissue density       Start:  07/31/25    Expected End:  09/17/25            4. Pt to be I and compliant with HEP to allow for increased function in affected limb.          Start:  07/31/25    Expected End:  09/17/25                Short Term Goals        1. Patient will show decreased girth in B LE by up to 1 cm to allow for LE symmetry, shoe and clothing choice, and ability to apply needed compression.         Start:  07/31/25    Expected End:  08/17/25            2. Patient will demonstrate 100% knowledge of lymphedema precautions and signs of infection to allow for reduced lymphedema risk, infection risk, and/or exacerbation of condition.         Start:  07/31/25    Expected End:  08/17/25            3. Patient or caregiver will perform self-bandaging techniques and/or wearing of compression garments to allow for lymphatic drainage support, skin elasticity, and reduction in shape and size of limb       Start:  07/31/25    Expected End:  08/17/25            4. Patient will tolerate daily activities with multilayered bandaging to allow for lymphatic and venous support.         Start:  07/31/25    Expected End:  08/17/25                Vanessa Slaughter PT

## 2025-08-07 ENCOUNTER — CLINICAL SUPPORT (OUTPATIENT)
Dept: REHABILITATION | Facility: HOSPITAL | Age: 65
End: 2025-08-07
Payer: COMMERCIAL

## 2025-08-07 DIAGNOSIS — I89.0 LYMPHEDEMA: ICD-10-CM

## 2025-08-07 DIAGNOSIS — S81.801D WOUND OF RIGHT LOWER EXTREMITY, SUBSEQUENT ENCOUNTER: Primary | ICD-10-CM

## 2025-08-07 DIAGNOSIS — I87.2 VENOUS INSUFFICIENCY: ICD-10-CM

## 2025-08-07 PROCEDURE — 97535 SELF CARE MNGMENT TRAINING: CPT

## 2025-08-11 DIAGNOSIS — L40.9 PSORIASIS: Primary | ICD-10-CM

## 2025-08-14 ENCOUNTER — CLINICAL SUPPORT (OUTPATIENT)
Dept: REHABILITATION | Facility: HOSPITAL | Age: 65
End: 2025-08-14
Payer: COMMERCIAL

## 2025-08-14 ENCOUNTER — TELEPHONE (OUTPATIENT)
Dept: PHARMACY | Facility: CLINIC | Age: 65
End: 2025-08-14
Payer: COMMERCIAL

## 2025-08-14 DIAGNOSIS — I89.0 LYMPHEDEMA: ICD-10-CM

## 2025-08-14 DIAGNOSIS — I89.0 LYMPHEDEMA: Primary | ICD-10-CM

## 2025-08-14 DIAGNOSIS — I87.2 VENOUS INSUFFICIENCY: ICD-10-CM

## 2025-08-14 DIAGNOSIS — S81.801D WOUND OF RIGHT LOWER EXTREMITY, SUBSEQUENT ENCOUNTER: Primary | ICD-10-CM

## 2025-08-14 PROCEDURE — 97535 SELF CARE MNGMENT TRAINING: CPT

## 2025-08-18 PROBLEM — G89.29 CHRONIC PAIN OF LEFT KNEE: Status: RESOLVED | Noted: 2025-07-01 | Resolved: 2025-08-18

## 2025-08-18 PROBLEM — M25.562 CHRONIC PAIN OF LEFT KNEE: Status: RESOLVED | Noted: 2025-07-01 | Resolved: 2025-08-18

## 2025-08-20 DIAGNOSIS — E11.9 TYPE 2 DIABETES MELLITUS WITHOUT COMPLICATION: ICD-10-CM

## 2025-08-21 ENCOUNTER — CLINICAL SUPPORT (OUTPATIENT)
Dept: REHABILITATION | Facility: HOSPITAL | Age: 65
End: 2025-08-21
Payer: COMMERCIAL

## 2025-08-21 DIAGNOSIS — I87.2 VENOUS INSUFFICIENCY: ICD-10-CM

## 2025-08-21 DIAGNOSIS — S81.801D WOUND OF RIGHT LOWER EXTREMITY, SUBSEQUENT ENCOUNTER: Primary | ICD-10-CM

## 2025-08-21 DIAGNOSIS — I89.0 LYMPHEDEMA: ICD-10-CM

## 2025-08-21 PROCEDURE — 97535 SELF CARE MNGMENT TRAINING: CPT

## 2025-08-27 DIAGNOSIS — E11.9 TYPE 2 DIABETES MELLITUS WITHOUT COMPLICATION: ICD-10-CM

## 2025-08-29 ENCOUNTER — LAB VISIT (OUTPATIENT)
Dept: LAB | Facility: HOSPITAL | Age: 65
End: 2025-08-29
Attending: INTERNAL MEDICINE
Payer: COMMERCIAL

## 2025-08-29 ENCOUNTER — OFFICE VISIT (OUTPATIENT)
Dept: INTERNAL MEDICINE | Facility: CLINIC | Age: 65
End: 2025-08-29
Payer: COMMERCIAL

## 2025-08-29 VITALS
OXYGEN SATURATION: 100 % | HEIGHT: 57 IN | WEIGHT: 173.75 LBS | HEART RATE: 77 BPM | SYSTOLIC BLOOD PRESSURE: 150 MMHG | DIASTOLIC BLOOD PRESSURE: 80 MMHG | BODY MASS INDEX: 37.48 KG/M2

## 2025-08-29 DIAGNOSIS — Z09 FOLLOW-UP EXAM: Primary | ICD-10-CM

## 2025-08-29 DIAGNOSIS — E11.42 TYPE 2 DIABETES MELLITUS WITH DIABETIC POLYNEUROPATHY, WITH LONG-TERM CURRENT USE OF INSULIN: ICD-10-CM

## 2025-08-29 DIAGNOSIS — Z79.4 TYPE 2 DIABETES MELLITUS WITH DIABETIC POLYNEUROPATHY, WITH LONG-TERM CURRENT USE OF INSULIN: ICD-10-CM

## 2025-08-29 DIAGNOSIS — E66.01 CLASS 2 SEVERE OBESITY DUE TO EXCESS CALORIES WITH SERIOUS COMORBIDITY AND BODY MASS INDEX (BMI) OF 39.0 TO 39.9 IN ADULT: ICD-10-CM

## 2025-08-29 DIAGNOSIS — Z12.11 SCREENING FOR COLON CANCER: ICD-10-CM

## 2025-08-29 DIAGNOSIS — I10 ESSENTIAL HYPERTENSION: ICD-10-CM

## 2025-08-29 DIAGNOSIS — E66.812 CLASS 2 SEVERE OBESITY DUE TO EXCESS CALORIES WITH SERIOUS COMORBIDITY AND BODY MASS INDEX (BMI) OF 39.0 TO 39.9 IN ADULT: ICD-10-CM

## 2025-08-29 LAB
ALBUMIN/CREAT UR: 18.3 UG/MG
ANION GAP (OHS): 10 MMOL/L (ref 8–16)
BUN SERPL-MCNC: 23 MG/DL (ref 8–23)
CALCIUM SERPL-MCNC: 9.4 MG/DL (ref 8.7–10.5)
CHLORIDE SERPL-SCNC: 102 MMOL/L (ref 95–110)
CO2 SERPL-SCNC: 26 MMOL/L (ref 23–29)
CREAT SERPL-MCNC: 0.7 MG/DL (ref 0.5–1.4)
CREAT UR-MCNC: 60 MG/DL (ref 15–325)
EAG (OHS): 108 MG/DL (ref 68–131)
GFR SERPLBLD CREATININE-BSD FMLA CKD-EPI: >60 ML/MIN/1.73/M2
GLUCOSE SERPL-MCNC: 107 MG/DL (ref 70–110)
HBA1C MFR BLD: 5.4 % (ref 4–5.6)
MICROALBUMIN UR-MCNC: 11 UG/ML
POTASSIUM SERPL-SCNC: 4.6 MMOL/L (ref 3.5–5.1)
SODIUM SERPL-SCNC: 138 MMOL/L (ref 136–145)

## 2025-08-29 PROCEDURE — 80048 BASIC METABOLIC PNL TOTAL CA: CPT

## 2025-08-29 PROCEDURE — 36415 COLL VENOUS BLD VENIPUNCTURE: CPT

## 2025-08-29 PROCEDURE — 83036 HEMOGLOBIN GLYCOSYLATED A1C: CPT

## 2025-08-29 PROCEDURE — 99999 PR PBB SHADOW E&M-EST. PATIENT-LVL V: CPT | Mod: PBBFAC,,, | Performed by: INTERNAL MEDICINE

## 2025-08-29 PROCEDURE — 82043 UR ALBUMIN QUANTITATIVE: CPT

## 2025-08-29 RX ORDER — LOSARTAN POTASSIUM 25 MG/1
25 TABLET ORAL DAILY
Qty: 90 TABLET | Refills: 3 | Status: SHIPPED | OUTPATIENT
Start: 2025-08-29